# Patient Record
Sex: FEMALE | Race: WHITE | NOT HISPANIC OR LATINO | Employment: OTHER | URBAN - METROPOLITAN AREA
[De-identification: names, ages, dates, MRNs, and addresses within clinical notes are randomized per-mention and may not be internally consistent; named-entity substitution may affect disease eponyms.]

---

## 2017-07-26 ENCOUNTER — CONVERSION ENCOUNTER (OUTPATIENT)
Dept: GENERAL RADIOLOGY | Facility: HOSPITAL | Age: 62
End: 2017-07-26

## 2018-06-11 ENCOUNTER — OFFICE VISIT CONVERTED (OUTPATIENT)
Dept: FAMILY MEDICINE CLINIC | Facility: CLINIC | Age: 63
End: 2018-06-11
Attending: NURSE PRACTITIONER

## 2018-07-30 ENCOUNTER — CONVERSION ENCOUNTER (OUTPATIENT)
Dept: GENERAL RADIOLOGY | Facility: HOSPITAL | Age: 63
End: 2018-07-30

## 2018-09-11 ENCOUNTER — CONVERSION ENCOUNTER (OUTPATIENT)
Dept: FAMILY MEDICINE CLINIC | Facility: CLINIC | Age: 63
End: 2018-09-11

## 2018-09-11 ENCOUNTER — OFFICE VISIT CONVERTED (OUTPATIENT)
Dept: FAMILY MEDICINE CLINIC | Facility: CLINIC | Age: 63
End: 2018-09-11
Attending: NURSE PRACTITIONER

## 2018-12-11 ENCOUNTER — OFFICE VISIT CONVERTED (OUTPATIENT)
Dept: FAMILY MEDICINE CLINIC | Facility: CLINIC | Age: 63
End: 2018-12-11
Attending: NURSE PRACTITIONER

## 2018-12-11 ENCOUNTER — CONVERSION ENCOUNTER (OUTPATIENT)
Dept: FAMILY MEDICINE CLINIC | Facility: CLINIC | Age: 63
End: 2018-12-11

## 2019-03-20 ENCOUNTER — HOSPITAL ENCOUNTER (OUTPATIENT)
Dept: LAB | Facility: HOSPITAL | Age: 64
Discharge: HOME | End: 2019-03-20
Attending: NURSE PRACTITIONER

## 2019-03-20 ENCOUNTER — OFFICE VISIT CONVERTED (OUTPATIENT)
Dept: FAMILY MEDICINE CLINIC | Facility: CLINIC | Age: 64
End: 2019-03-20
Attending: NURSE PRACTITIONER

## 2019-03-20 LAB
25(OH)D3 SERPL-MCNC: 29.1 NG/ML (ref 30–100)
EST. AVERAGE GLUCOSE BLD GHB EST-MCNC: 128 MG/DL
HBA1C MFR BLD: 6.1 % (ref 3.5–5.7)

## 2019-07-10 ENCOUNTER — HOSPITAL ENCOUNTER (OUTPATIENT)
Dept: LAB | Facility: HOSPITAL | Age: 64
Discharge: HOME | End: 2019-07-10
Attending: NURSE PRACTITIONER

## 2019-07-10 LAB
25(OH)D3 SERPL-MCNC: 28.2 NG/ML (ref 30–100)
ALBUMIN SERPL-MCNC: 3.9 G/DL (ref 3.5–5)
ALBUMIN/GLOB SERPL: 1.2 {RATIO} (ref 1.4–2.6)
ALP SERPL-CCNC: 123 U/L (ref 43–160)
ALT SERPL-CCNC: 18 U/L (ref 10–40)
ANION GAP SERPL CALC-SCNC: 19 MMOL/L (ref 8–19)
AST SERPL-CCNC: 14 U/L (ref 15–50)
BASOPHILS # BLD AUTO: 0.03 10*3/UL (ref 0–0.2)
BASOPHILS NFR BLD AUTO: 0.4 % (ref 0–3)
BILIRUB SERPL-MCNC: 0.2 MG/DL (ref 0.2–1.3)
BUN SERPL-MCNC: 12 MG/DL (ref 5–25)
BUN/CREAT SERPL: 20 {RATIO} (ref 6–20)
CALCIUM SERPL-MCNC: 9.1 MG/DL (ref 8.7–10.4)
CHLORIDE SERPL-SCNC: 97 MMOL/L (ref 99–111)
CHOLEST SERPL-MCNC: 165 MG/DL (ref 107–200)
CHOLEST/HDLC SERPL: 4.5 {RATIO} (ref 3–6)
CONV ABS IMM GRAN: 0.06 10*3/UL (ref 0–0.2)
CONV CO2: 26 MMOL/L (ref 22–32)
CONV IMMATURE GRAN: 0.7 % (ref 0–1.8)
CONV TOTAL PROTEIN: 7.1 G/DL (ref 6.3–8.2)
CREAT UR-MCNC: 0.59 MG/DL (ref 0.5–0.9)
DEPRECATED RDW RBC AUTO: 48.3 FL (ref 36.4–46.3)
EOSINOPHIL # BLD AUTO: 0.24 10*3/UL (ref 0–0.7)
EOSINOPHIL # BLD AUTO: 2.8 % (ref 0–7)
ERYTHROCYTE [DISTWIDTH] IN BLOOD BY AUTOMATED COUNT: 16 % (ref 11.7–14.4)
EST. AVERAGE GLUCOSE BLD GHB EST-MCNC: 131 MG/DL
GFR SERPLBLD BASED ON 1.73 SQ M-ARVRAT: >60 ML/MIN/{1.73_M2}
GLOBULIN UR ELPH-MCNC: 3.2 G/DL (ref 2–3.5)
GLUCOSE SERPL-MCNC: 104 MG/DL (ref 65–99)
HBA1C MFR BLD: 11.4 G/DL (ref 12–16)
HBA1C MFR BLD: 6.2 % (ref 3.5–5.7)
HCT VFR BLD AUTO: 38.4 % (ref 37–47)
HDLC SERPL-MCNC: 37 MG/DL (ref 40–60)
LDLC SERPL CALC-MCNC: 94 MG/DL (ref 70–100)
LYMPHOCYTES # BLD AUTO: 1.83 10*3/UL (ref 1–5)
MCH RBC QN AUTO: 24.5 PG (ref 27–31)
MCHC RBC AUTO-ENTMCNC: 29.7 G/DL (ref 33–37)
MCV RBC AUTO: 82.4 FL (ref 81–99)
MONOCYTES # BLD AUTO: 0.4 10*3/UL (ref 0.2–1.2)
MONOCYTES NFR BLD AUTO: 4.7 % (ref 3–10)
NEUTROPHILS # BLD AUTO: 5.99 10*3/UL (ref 2–8)
NEUTROPHILS NFR BLD AUTO: 70 % (ref 30–85)
NRBC CBCN: 0 % (ref 0–0.7)
OSMOLALITY SERPL CALC.SUM OF ELEC: 286 MOSM/KG (ref 273–304)
PLATELET # BLD AUTO: 327 10*3/UL (ref 130–400)
PMV BLD AUTO: 10.7 FL (ref 9.4–12.3)
POTASSIUM SERPL-SCNC: 4.1 MMOL/L (ref 3.5–5.3)
RBC # BLD AUTO: 4.66 10*6/UL (ref 4.2–5.4)
SODIUM SERPL-SCNC: 138 MMOL/L (ref 135–147)
T4 FREE SERPL-MCNC: 1.1 NG/DL (ref 0.9–1.8)
TRIGL SERPL-MCNC: 170 MG/DL (ref 40–150)
TSH SERPL-ACNC: 2.43 M[IU]/L (ref 0.27–4.2)
VARIANT LYMPHS NFR BLD MANUAL: 21.4 % (ref 20–45)
VLDLC SERPL-MCNC: 34 MG/DL (ref 5–37)
WBC # BLD AUTO: 8.55 10*3/UL (ref 4.8–10.8)

## 2019-07-17 ENCOUNTER — OFFICE VISIT CONVERTED (OUTPATIENT)
Dept: FAMILY MEDICINE CLINIC | Facility: CLINIC | Age: 64
End: 2019-07-17
Attending: NURSE PRACTITIONER

## 2019-10-17 ENCOUNTER — OFFICE VISIT CONVERTED (OUTPATIENT)
Dept: FAMILY MEDICINE CLINIC | Facility: CLINIC | Age: 64
End: 2019-10-17
Attending: NURSE PRACTITIONER

## 2019-10-23 ENCOUNTER — HOSPITAL ENCOUNTER (OUTPATIENT)
Dept: LAB | Facility: HOSPITAL | Age: 64
Discharge: HOME | End: 2019-10-23
Attending: NURSE PRACTITIONER

## 2019-10-23 LAB
25(OH)D3 SERPL-MCNC: 23 NG/ML (ref 30–100)
ALBUMIN SERPL-MCNC: 4 G/DL (ref 3.5–5)
ALBUMIN/GLOB SERPL: 1.3 {RATIO} (ref 1.4–2.6)
ALP SERPL-CCNC: 115 U/L (ref 43–160)
ALT SERPL-CCNC: 29 U/L (ref 10–40)
ANION GAP SERPL CALC-SCNC: 20 MMOL/L (ref 8–19)
AST SERPL-CCNC: 21 U/L (ref 15–50)
BASOPHILS # BLD AUTO: 0.04 10*3/UL (ref 0–0.2)
BASOPHILS NFR BLD AUTO: 0.4 % (ref 0–3)
BILIRUB SERPL-MCNC: 0.18 MG/DL (ref 0.2–1.3)
BUN SERPL-MCNC: 11 MG/DL (ref 5–25)
BUN/CREAT SERPL: 22 {RATIO} (ref 6–20)
CALCIUM SERPL-MCNC: 9.4 MG/DL (ref 8.7–10.4)
CHLORIDE SERPL-SCNC: 96 MMOL/L (ref 99–111)
CHOLEST SERPL-MCNC: 160 MG/DL (ref 107–200)
CHOLEST/HDLC SERPL: 4.1 {RATIO} (ref 3–6)
CONV ABS IMM GRAN: 0.04 10*3/UL (ref 0–0.2)
CONV CO2: 26 MMOL/L (ref 22–32)
CONV IMMATURE GRAN: 0.4 % (ref 0–1.8)
CONV TOTAL PROTEIN: 7.1 G/DL (ref 6.3–8.2)
CREAT UR-MCNC: 0.49 MG/DL (ref 0.5–0.9)
DEPRECATED RDW RBC AUTO: 48.9 FL (ref 36.4–46.3)
EOSINOPHIL # BLD AUTO: 0.21 10*3/UL (ref 0–0.7)
EOSINOPHIL # BLD AUTO: 2.2 % (ref 0–7)
ERYTHROCYTE [DISTWIDTH] IN BLOOD BY AUTOMATED COUNT: 16.4 % (ref 11.7–14.4)
EST. AVERAGE GLUCOSE BLD GHB EST-MCNC: 123 MG/DL
GFR SERPLBLD BASED ON 1.73 SQ M-ARVRAT: >60 ML/MIN/{1.73_M2}
GLOBULIN UR ELPH-MCNC: 3.1 G/DL (ref 2–3.5)
GLUCOSE SERPL-MCNC: 111 MG/DL (ref 65–99)
HBA1C MFR BLD: 5.9 % (ref 3.5–5.7)
HCT VFR BLD AUTO: 36.2 % (ref 37–47)
HDLC SERPL-MCNC: 39 MG/DL (ref 40–60)
HGB BLD-MCNC: 11 G/DL (ref 12–16)
LDLC SERPL CALC-MCNC: 72 MG/DL (ref 70–100)
LYMPHOCYTES # BLD AUTO: 1.75 10*3/UL (ref 1–5)
LYMPHOCYTES NFR BLD AUTO: 18.7 % (ref 20–45)
MCH RBC QN AUTO: 24.8 PG (ref 27–31)
MCHC RBC AUTO-ENTMCNC: 30.4 G/DL (ref 33–37)
MCV RBC AUTO: 81.7 FL (ref 81–99)
MONOCYTES # BLD AUTO: 0.47 10*3/UL (ref 0.2–1.2)
MONOCYTES NFR BLD AUTO: 5 % (ref 3–10)
NEUTROPHILS # BLD AUTO: 6.83 10*3/UL (ref 2–8)
NEUTROPHILS NFR BLD AUTO: 73.3 % (ref 30–85)
NRBC CBCN: 0 % (ref 0–0.7)
OSMOLALITY SERPL CALC.SUM OF ELEC: 286 MOSM/KG (ref 273–304)
PLATELET # BLD AUTO: 323 10*3/UL (ref 130–400)
PMV BLD AUTO: 10.3 FL (ref 9.4–12.3)
POTASSIUM SERPL-SCNC: 3.9 MMOL/L (ref 3.5–5.3)
RBC # BLD AUTO: 4.43 10*6/UL (ref 4.2–5.4)
SODIUM SERPL-SCNC: 138 MMOL/L (ref 135–147)
T4 FREE SERPL-MCNC: 1.3 NG/DL (ref 0.9–1.8)
TRIGL SERPL-MCNC: 245 MG/DL (ref 40–150)
TSH SERPL-ACNC: 2.02 M[IU]/L (ref 0.27–4.2)
VLDLC SERPL-MCNC: 49 MG/DL (ref 5–37)
WBC # BLD AUTO: 9.34 10*3/UL (ref 4.8–10.8)

## 2019-11-04 ENCOUNTER — HOSPITAL ENCOUNTER (OUTPATIENT)
Dept: LAB | Facility: HOSPITAL | Age: 64
Discharge: HOME | End: 2019-11-04
Attending: NURSE PRACTITIONER

## 2019-11-04 LAB
BASOPHILS # BLD AUTO: 0.04 10*3/UL (ref 0–0.2)
BASOPHILS NFR BLD AUTO: 0.5 % (ref 0–3)
CONV ABS IMM GRAN: 0.03 10*3/UL (ref 0–0.2)
CONV IMMATURE GRAN: 0.4 % (ref 0–1.8)
DEPRECATED RDW RBC AUTO: 48.6 FL (ref 36.4–46.3)
EOSINOPHIL # BLD AUTO: 0.22 10*3/UL (ref 0–0.7)
EOSINOPHIL # BLD AUTO: 2.8 % (ref 0–7)
ERYTHROCYTE [DISTWIDTH] IN BLOOD BY AUTOMATED COUNT: 16.4 % (ref 11.7–14.4)
HCT VFR BLD AUTO: 35.8 % (ref 37–47)
HGB BLD-MCNC: 11.2 G/DL (ref 12–16)
IRON SATN MFR SERPL: 13 % (ref 20–55)
IRON SERPL-MCNC: 52 UG/DL (ref 60–170)
LYMPHOCYTES # BLD AUTO: 1.56 10*3/UL (ref 1–5)
LYMPHOCYTES NFR BLD AUTO: 20 % (ref 20–45)
MCH RBC QN AUTO: 25.3 PG (ref 27–31)
MCHC RBC AUTO-ENTMCNC: 31.3 G/DL (ref 33–37)
MCV RBC AUTO: 80.8 FL (ref 81–99)
MONOCYTES # BLD AUTO: 0.34 10*3/UL (ref 0.2–1.2)
MONOCYTES NFR BLD AUTO: 4.4 % (ref 3–10)
NEUTROPHILS # BLD AUTO: 5.62 10*3/UL (ref 2–8)
NEUTROPHILS NFR BLD AUTO: 71.9 % (ref 30–85)
NRBC CBCN: 0 % (ref 0–0.7)
PLATELET # BLD AUTO: 318 10*3/UL (ref 130–400)
PMV BLD AUTO: 10.4 FL (ref 9.4–12.3)
RBC # BLD AUTO: 4.43 10*6/UL (ref 4.2–5.4)
TIBC SERPL-MCNC: 406 UG/DL (ref 245–450)
TRANSFERRIN SERPL-MCNC: 284 MG/DL (ref 250–380)
WBC # BLD AUTO: 7.81 10*3/UL (ref 4.8–10.8)

## 2020-01-14 ENCOUNTER — HOSPITAL ENCOUNTER (OUTPATIENT)
Dept: GENERAL RADIOLOGY | Facility: HOSPITAL | Age: 65
Discharge: HOME | End: 2020-01-14
Attending: NURSE PRACTITIONER

## 2020-01-21 ENCOUNTER — OFFICE VISIT CONVERTED (OUTPATIENT)
Dept: FAMILY MEDICINE CLINIC | Facility: CLINIC | Age: 65
End: 2020-01-21
Attending: NURSE PRACTITIONER

## 2020-01-29 ENCOUNTER — HOSPITAL ENCOUNTER (OUTPATIENT)
Dept: LAB | Facility: HOSPITAL | Age: 65
Discharge: HOME | End: 2020-01-29
Attending: NURSE PRACTITIONER

## 2020-01-29 LAB
ALBUMIN SERPL-MCNC: 4 G/DL (ref 3.5–5)
ALBUMIN/GLOB SERPL: 1.3 {RATIO} (ref 1.4–2.6)
ALP SERPL-CCNC: 112 U/L (ref 43–160)
ALT SERPL-CCNC: 25 U/L (ref 10–40)
ANION GAP SERPL CALC-SCNC: 20 MMOL/L (ref 8–19)
AST SERPL-CCNC: 19 U/L (ref 15–50)
BASOPHILS # BLD AUTO: 0.04 10*3/UL (ref 0–0.2)
BASOPHILS NFR BLD AUTO: 0.5 % (ref 0–3)
BILIRUB SERPL-MCNC: 0.33 MG/DL (ref 0.2–1.3)
BUN SERPL-MCNC: 12 MG/DL (ref 5–25)
BUN/CREAT SERPL: 26 {RATIO} (ref 6–20)
CALCIUM SERPL-MCNC: 9.4 MG/DL (ref 8.7–10.4)
CHLORIDE SERPL-SCNC: 98 MMOL/L (ref 99–111)
CHOLEST SERPL-MCNC: 172 MG/DL (ref 107–200)
CHOLEST/HDLC SERPL: 5.1 {RATIO} (ref 3–6)
CONV ABS IMM GRAN: 0.04 10*3/UL (ref 0–0.2)
CONV CO2: 25 MMOL/L (ref 22–32)
CONV CREATININE URINE, RANDOM: 174 MG/DL (ref 10–300)
CONV IMMATURE GRAN: 0.5 % (ref 0–1.8)
CONV MICROALBUM.,U,RANDOM: <12 MG/L (ref 0–20)
CONV TOTAL PROTEIN: 7.1 G/DL (ref 6.3–8.2)
CREAT UR-MCNC: 0.47 MG/DL (ref 0.5–0.9)
DEPRECATED RDW RBC AUTO: 51.2 FL (ref 36.4–46.3)
EOSINOPHIL # BLD AUTO: 0.23 10*3/UL (ref 0–0.7)
EOSINOPHIL # BLD AUTO: 2.8 % (ref 0–7)
ERYTHROCYTE [DISTWIDTH] IN BLOOD BY AUTOMATED COUNT: 16.7 % (ref 11.7–14.4)
EST. AVERAGE GLUCOSE BLD GHB EST-MCNC: 137 MG/DL
GFR SERPLBLD BASED ON 1.73 SQ M-ARVRAT: >60 ML/MIN/{1.73_M2}
GLOBULIN UR ELPH-MCNC: 3.1 G/DL (ref 2–3.5)
GLUCOSE SERPL-MCNC: 112 MG/DL (ref 65–99)
HBA1C MFR BLD: 6.4 % (ref 3.5–5.7)
HCT VFR BLD AUTO: 37.8 % (ref 37–47)
HDLC SERPL-MCNC: 34 MG/DL (ref 40–60)
HGB BLD-MCNC: 11.6 G/DL (ref 12–16)
LDLC SERPL CALC-MCNC: 97 MG/DL (ref 70–100)
LYMPHOCYTES # BLD AUTO: 1.71 10*3/UL (ref 1–5)
LYMPHOCYTES NFR BLD AUTO: 20.5 % (ref 20–45)
MCH RBC QN AUTO: 25.8 PG (ref 27–31)
MCHC RBC AUTO-ENTMCNC: 30.7 G/DL (ref 33–37)
MCV RBC AUTO: 84.2 FL (ref 81–99)
MICROALBUMIN/CREAT UR: 6.9 MG/G{CRE} (ref 0–35)
MONOCYTES # BLD AUTO: 0.41 10*3/UL (ref 0.2–1.2)
MONOCYTES NFR BLD AUTO: 4.9 % (ref 3–10)
NEUTROPHILS # BLD AUTO: 5.93 10*3/UL (ref 2–8)
NEUTROPHILS NFR BLD AUTO: 70.8 % (ref 30–85)
NRBC CBCN: 0 % (ref 0–0.7)
OSMOLALITY SERPL CALC.SUM OF ELEC: 289 MOSM/KG (ref 273–304)
PLATELET # BLD AUTO: 313 10*3/UL (ref 130–400)
PMV BLD AUTO: 10.7 FL (ref 9.4–12.3)
POTASSIUM SERPL-SCNC: 3.8 MMOL/L (ref 3.5–5.3)
RBC # BLD AUTO: 4.49 10*6/UL (ref 4.2–5.4)
SODIUM SERPL-SCNC: 139 MMOL/L (ref 135–147)
T4 FREE SERPL-MCNC: 1.1 NG/DL (ref 0.9–1.8)
TRIGL SERPL-MCNC: 205 MG/DL (ref 40–150)
TSH SERPL-ACNC: 1.96 M[IU]/L (ref 0.27–4.2)
VLDLC SERPL-MCNC: 41 MG/DL (ref 5–37)
WBC # BLD AUTO: 8.36 10*3/UL (ref 4.8–10.8)

## 2020-02-20 ENCOUNTER — CONVERSION ENCOUNTER (OUTPATIENT)
Dept: FAMILY MEDICINE CLINIC | Facility: CLINIC | Age: 65
End: 2020-02-20

## 2020-02-20 ENCOUNTER — OFFICE VISIT CONVERTED (OUTPATIENT)
Dept: FAMILY MEDICINE CLINIC | Facility: CLINIC | Age: 65
End: 2020-02-20
Attending: NURSE PRACTITIONER

## 2020-05-15 ENCOUNTER — HOSPITAL ENCOUNTER (OUTPATIENT)
Dept: LAB | Facility: HOSPITAL | Age: 65
Discharge: HOME | End: 2020-05-15
Attending: NURSE PRACTITIONER

## 2020-05-15 LAB
EST. AVERAGE GLUCOSE BLD GHB EST-MCNC: 131 MG/DL
HBA1C MFR BLD: 6.2 % (ref 3.5–5.7)

## 2020-05-19 ENCOUNTER — HOSPITAL ENCOUNTER (OUTPATIENT)
Dept: LAB | Facility: HOSPITAL | Age: 65
Discharge: HOME | End: 2020-05-19
Attending: NURSE PRACTITIONER

## 2020-05-19 LAB
ALBUMIN SERPL-MCNC: 4.2 G/DL (ref 3.5–5)
ALBUMIN/GLOB SERPL: 1.3 {RATIO} (ref 1.4–2.6)
ALP SERPL-CCNC: 111 U/L (ref 43–160)
ALT SERPL-CCNC: 19 U/L (ref 10–40)
ANION GAP SERPL CALC-SCNC: 20 MMOL/L (ref 8–19)
AST SERPL-CCNC: 18 U/L (ref 15–50)
BASOPHILS # BLD AUTO: 0.05 10*3/UL (ref 0–0.2)
BASOPHILS NFR BLD AUTO: 0.6 % (ref 0–3)
BILIRUB SERPL-MCNC: 0.25 MG/DL (ref 0.2–1.3)
BUN SERPL-MCNC: 12 MG/DL (ref 5–25)
BUN/CREAT SERPL: 19 {RATIO} (ref 6–20)
CALCIUM SERPL-MCNC: 9.4 MG/DL (ref 8.7–10.4)
CHLORIDE SERPL-SCNC: 99 MMOL/L (ref 99–111)
CHOLEST SERPL-MCNC: 176 MG/DL (ref 107–200)
CHOLEST/HDLC SERPL: 4.8 {RATIO} (ref 3–6)
CONV ABS IMM GRAN: 0.03 10*3/UL (ref 0–0.2)
CONV CO2: 23 MMOL/L (ref 22–32)
CONV IMMATURE GRAN: 0.4 % (ref 0–1.8)
CONV TOTAL PROTEIN: 7.4 G/DL (ref 6.3–8.2)
CREAT UR-MCNC: 0.63 MG/DL (ref 0.5–0.9)
DEPRECATED RDW RBC AUTO: 46.7 FL (ref 36.4–46.3)
EOSINOPHIL # BLD AUTO: 0.26 10*3/UL (ref 0–0.7)
EOSINOPHIL # BLD AUTO: 3.1 % (ref 0–7)
ERYTHROCYTE [DISTWIDTH] IN BLOOD BY AUTOMATED COUNT: 15.4 % (ref 11.7–14.4)
EST. AVERAGE GLUCOSE BLD GHB EST-MCNC: 140 MG/DL
GFR SERPLBLD BASED ON 1.73 SQ M-ARVRAT: >60 ML/MIN/{1.73_M2}
GLOBULIN UR ELPH-MCNC: 3.2 G/DL (ref 2–3.5)
GLUCOSE SERPL-MCNC: 118 MG/DL (ref 65–99)
HBA1C MFR BLD: 6.5 % (ref 3.5–5.7)
HCT VFR BLD AUTO: 42.1 % (ref 37–47)
HDLC SERPL-MCNC: 37 MG/DL (ref 40–60)
HGB BLD-MCNC: 12.9 G/DL (ref 12–16)
LDLC SERPL CALC-MCNC: 102 MG/DL (ref 70–100)
LYMPHOCYTES # BLD AUTO: 1.58 10*3/UL (ref 1–5)
LYMPHOCYTES NFR BLD AUTO: 18.8 % (ref 20–45)
MCH RBC QN AUTO: 25.9 PG (ref 27–31)
MCHC RBC AUTO-ENTMCNC: 30.6 G/DL (ref 33–37)
MCV RBC AUTO: 84.4 FL (ref 81–99)
MONOCYTES # BLD AUTO: 0.41 10*3/UL (ref 0.2–1.2)
MONOCYTES NFR BLD AUTO: 4.9 % (ref 3–10)
NEUTROPHILS # BLD AUTO: 6.06 10*3/UL (ref 2–8)
NEUTROPHILS NFR BLD AUTO: 72.2 % (ref 30–85)
NRBC CBCN: 0 % (ref 0–0.7)
OSMOLALITY SERPL CALC.SUM OF ELEC: 287 MOSM/KG (ref 273–304)
PLATELET # BLD AUTO: 319 10*3/UL (ref 130–400)
PMV BLD AUTO: 10.8 FL (ref 9.4–12.3)
POTASSIUM SERPL-SCNC: 4 MMOL/L (ref 3.5–5.3)
RBC # BLD AUTO: 4.99 10*6/UL (ref 4.2–5.4)
SODIUM SERPL-SCNC: 138 MMOL/L (ref 135–147)
T4 FREE SERPL-MCNC: 1 NG/DL (ref 0.9–1.8)
TRIGL SERPL-MCNC: 186 MG/DL (ref 40–150)
TSH SERPL-ACNC: 1.53 M[IU]/L (ref 0.27–4.2)
VLDLC SERPL-MCNC: 37 MG/DL (ref 5–37)
WBC # BLD AUTO: 8.39 10*3/UL (ref 4.8–10.8)

## 2020-05-20 ENCOUNTER — OFFICE VISIT CONVERTED (OUTPATIENT)
Dept: FAMILY MEDICINE CLINIC | Facility: CLINIC | Age: 65
End: 2020-05-20
Attending: NURSE PRACTITIONER

## 2020-05-20 ENCOUNTER — CONVERSION ENCOUNTER (OUTPATIENT)
Dept: FAMILY MEDICINE CLINIC | Facility: CLINIC | Age: 65
End: 2020-05-20

## 2020-08-20 ENCOUNTER — HOSPITAL ENCOUNTER (OUTPATIENT)
Dept: LAB | Facility: HOSPITAL | Age: 65
Discharge: HOME | End: 2020-08-20
Attending: NURSE PRACTITIONER

## 2020-08-20 LAB
BASOPHILS # BLD AUTO: 0.03 10*3/UL (ref 0–0.2)
BASOPHILS NFR BLD AUTO: 0.5 % (ref 0–3)
CONV ABS IMM GRAN: 0.01 10*3/UL (ref 0–0.2)
CONV IMMATURE GRAN: 0.2 % (ref 0–1.8)
DEPRECATED RDW RBC AUTO: 49.3 FL (ref 36.4–46.3)
EOSINOPHIL # BLD AUTO: 0.18 10*3/UL (ref 0–0.7)
EOSINOPHIL # BLD AUTO: 2.8 % (ref 0–7)
ERYTHROCYTE [DISTWIDTH] IN BLOOD BY AUTOMATED COUNT: 16.1 % (ref 11.7–14.4)
EST. AVERAGE GLUCOSE BLD GHB EST-MCNC: 126 MG/DL
HBA1C MFR BLD: 6 % (ref 3.5–5.7)
HCT VFR BLD AUTO: 43.1 % (ref 37–47)
HGB BLD-MCNC: 13.2 G/DL (ref 12–16)
LYMPHOCYTES # BLD AUTO: 1.66 10*3/UL (ref 1–5)
LYMPHOCYTES NFR BLD AUTO: 25.5 % (ref 20–45)
MCH RBC QN AUTO: 25.7 PG (ref 27–31)
MCHC RBC AUTO-ENTMCNC: 30.6 G/DL (ref 33–37)
MCV RBC AUTO: 84 FL (ref 81–99)
MONOCYTES # BLD AUTO: 0.54 10*3/UL (ref 0.2–1.2)
MONOCYTES NFR BLD AUTO: 8.3 % (ref 3–10)
NEUTROPHILS # BLD AUTO: 4.08 10*3/UL (ref 2–8)
NEUTROPHILS NFR BLD AUTO: 62.7 % (ref 30–85)
NRBC CBCN: 0 % (ref 0–0.7)
PLATELET # BLD AUTO: 291 10*3/UL (ref 130–400)
PMV BLD AUTO: 10.6 FL (ref 9.4–12.3)
RBC # BLD AUTO: 5.13 10*6/UL (ref 4.2–5.4)
WBC # BLD AUTO: 6.5 10*3/UL (ref 4.8–10.8)

## 2020-08-21 LAB
25(OH)D3 SERPL-MCNC: 30.1 NG/ML (ref 30–100)
ALBUMIN SERPL-MCNC: 4.2 G/DL (ref 3.5–5)
ALBUMIN/GLOB SERPL: 1.4 {RATIO} (ref 1.4–2.6)
ALP SERPL-CCNC: 104 U/L (ref 43–160)
ALT SERPL-CCNC: 25 U/L (ref 10–40)
ANION GAP SERPL CALC-SCNC: 19 MMOL/L (ref 8–19)
AST SERPL-CCNC: 24 U/L (ref 15–50)
BILIRUB SERPL-MCNC: 0.25 MG/DL (ref 0.2–1.3)
BUN SERPL-MCNC: 11 MG/DL (ref 5–25)
BUN/CREAT SERPL: 16 {RATIO} (ref 6–20)
CALCIUM SERPL-MCNC: 9.9 MG/DL (ref 8.7–10.4)
CHLORIDE SERPL-SCNC: 98 MMOL/L (ref 99–111)
CONV CO2: 25 MMOL/L (ref 22–32)
CONV TOTAL PROTEIN: 7.1 G/DL (ref 6.3–8.2)
CREAT UR-MCNC: 0.7 MG/DL (ref 0.5–0.9)
GFR SERPLBLD BASED ON 1.73 SQ M-ARVRAT: >60 ML/MIN/{1.73_M2}
GLOBULIN UR ELPH-MCNC: 2.9 G/DL (ref 2–3.5)
GLUCOSE SERPL-MCNC: 113 MG/DL (ref 65–99)
IRON SATN MFR SERPL: 11 % (ref 20–55)
IRON SERPL-MCNC: 43 UG/DL (ref 60–170)
OSMOLALITY SERPL CALC.SUM OF ELEC: 286 MOSM/KG (ref 273–304)
POTASSIUM SERPL-SCNC: 3.8 MMOL/L (ref 3.5–5.3)
SODIUM SERPL-SCNC: 138 MMOL/L (ref 135–147)
T4 FREE SERPL-MCNC: 1 NG/DL (ref 0.9–1.8)
TIBC SERPL-MCNC: 375 UG/DL (ref 245–450)
TRANSFERRIN SERPL-MCNC: 262 MG/DL (ref 250–380)
TSH SERPL-ACNC: 1.81 M[IU]/L (ref 0.27–4.2)

## 2020-08-25 ENCOUNTER — OFFICE VISIT CONVERTED (OUTPATIENT)
Dept: FAMILY MEDICINE CLINIC | Facility: CLINIC | Age: 65
End: 2020-08-25
Attending: NURSE PRACTITIONER

## 2021-02-25 ENCOUNTER — OFFICE VISIT CONVERTED (OUTPATIENT)
Dept: FAMILY MEDICINE CLINIC | Facility: CLINIC | Age: 66
End: 2021-02-25
Attending: NURSE PRACTITIONER

## 2021-02-25 ENCOUNTER — CONVERSION ENCOUNTER (OUTPATIENT)
Dept: FAMILY MEDICINE CLINIC | Facility: CLINIC | Age: 66
End: 2021-02-25

## 2021-03-23 ENCOUNTER — OFFICE VISIT CONVERTED (OUTPATIENT)
Dept: FAMILY MEDICINE CLINIC | Facility: CLINIC | Age: 66
End: 2021-03-23
Attending: NURSE PRACTITIONER

## 2021-04-13 ENCOUNTER — CONVERSION ENCOUNTER (OUTPATIENT)
Dept: FAMILY MEDICINE CLINIC | Facility: CLINIC | Age: 66
End: 2021-04-13

## 2021-04-13 ENCOUNTER — OFFICE VISIT CONVERTED (OUTPATIENT)
Dept: FAMILY MEDICINE CLINIC | Facility: CLINIC | Age: 66
End: 2021-04-13
Attending: NURSE PRACTITIONER

## 2021-04-13 ENCOUNTER — HOSPITAL ENCOUNTER (OUTPATIENT)
Dept: FAMILY MEDICINE CLINIC | Facility: CLINIC | Age: 66
Discharge: HOME | End: 2021-04-13
Attending: NURSE PRACTITIONER

## 2021-04-13 LAB
BILIRUB UR QL STRIP: NEGATIVE
COLOR UR: YELLOW
CONV CLARITY OF URINE: NORMAL
CONV PROTEIN IN URINE BY AUTOMATED TEST STRIP: NEGATIVE
CONV UROBILINOGEN IN URINE BY AUTOMATED TEST STRIP: NORMAL
GLUCOSE UR QL: NORMAL
HGB UR QL STRIP: NEGATIVE
KETONES UR QL STRIP: NEGATIVE
LEUKOCYTE ESTERASE UR QL STRIP: NORMAL
NITRITE UR QL STRIP: NEGATIVE
PH UR STRIP.AUTO: 7 [PH]
SP GR UR: 1.01

## 2021-04-15 LAB
BACTERIA SPEC AEROBE CULT: NORMAL
BACTERIA UR CULT: NORMAL

## 2021-04-21 ENCOUNTER — CONVERSION ENCOUNTER (OUTPATIENT)
Dept: FAMILY MEDICINE CLINIC | Facility: CLINIC | Age: 66
End: 2021-04-21

## 2021-04-21 ENCOUNTER — OFFICE VISIT CONVERTED (OUTPATIENT)
Dept: FAMILY MEDICINE CLINIC | Facility: CLINIC | Age: 66
End: 2021-04-21
Attending: NURSE PRACTITIONER

## 2021-04-21 ENCOUNTER — HOSPITAL ENCOUNTER (OUTPATIENT)
Dept: FAMILY MEDICINE CLINIC | Facility: CLINIC | Age: 66
Discharge: HOME | End: 2021-04-21
Attending: NURSE PRACTITIONER

## 2021-04-24 LAB
AMPICILLIN SUSC ISLT: <=2
AMPICILLIN SUSC ISLT: <=2
AMPICILLIN+SULBAC SUSC ISLT: <=2
AMPICILLIN+SULBAC SUSC ISLT: <=2
BACTERIA SPEC AEROBE CULT: ABNORMAL
CEFAZOLIN SUSC ISLT: <=4
CEFAZOLIN SUSC ISLT: <=4
CEFEPIME SUSC ISLT: <=0.12
CEFEPIME SUSC ISLT: <=0.12
CEFTAZIDIME SUSC ISLT: <=1
CEFTAZIDIME SUSC ISLT: <=1
CEFTRIAXONE SUSC ISLT: <=0.25
CEFTRIAXONE SUSC ISLT: <=0.25
CIPROFLOXACIN SUSC ISLT: <=0.25
CIPROFLOXACIN SUSC ISLT: <=0.25
ERTAPENEM SUSC ISLT: <=0.12
ERTAPENEM SUSC ISLT: <=0.12
GENTAMICIN SUSC ISLT: <=1
GENTAMICIN SUSC ISLT: <=1
LEVOFLOXACIN SUSC ISLT: <=0.12
LEVOFLOXACIN SUSC ISLT: <=0.12
PIP+TAZO SUSC ISLT: <=4
PIP+TAZO SUSC ISLT: <=4
TMP SMX SUSC ISLT: <=20
TMP SMX SUSC ISLT: <=20
TOBRAMYCIN SUSC ISLT: <=1
TOBRAMYCIN SUSC ISLT: <=1

## 2021-05-07 ENCOUNTER — HOSPITAL ENCOUNTER (OUTPATIENT)
Dept: LAB | Facility: HOSPITAL | Age: 66
Discharge: HOME | End: 2021-05-07
Attending: NURSE PRACTITIONER

## 2021-05-07 LAB
25(OH)D3 SERPL-MCNC: 29 NG/ML (ref 30–100)
ALBUMIN SERPL-MCNC: 4.2 G/DL (ref 3.5–5)
ALBUMIN/GLOB SERPL: 1.3 {RATIO} (ref 1.4–2.6)
ALP SERPL-CCNC: 110 U/L (ref 43–160)
ALT SERPL-CCNC: 29 U/L (ref 10–40)
ANION GAP SERPL CALC-SCNC: 17 MMOL/L (ref 8–19)
AST SERPL-CCNC: 22 U/L (ref 15–50)
BASOPHILS # BLD AUTO: 0.04 10*3/UL (ref 0–0.2)
BASOPHILS NFR BLD AUTO: 0.5 % (ref 0–3)
BILIRUB SERPL-MCNC: 0.19 MG/DL (ref 0.2–1.3)
BUN SERPL-MCNC: 11 MG/DL (ref 5–25)
BUN/CREAT SERPL: 17 {RATIO} (ref 6–20)
CALCIUM SERPL-MCNC: 9.2 MG/DL (ref 8.7–10.4)
CHLORIDE SERPL-SCNC: 98 MMOL/L (ref 99–111)
CHOLEST SERPL-MCNC: 182 MG/DL (ref 107–200)
CHOLEST/HDLC SERPL: 4.3 {RATIO} (ref 3–6)
CONV ABS IMM GRAN: 0.03 10*3/UL (ref 0–0.2)
CONV CO2: 27 MMOL/L (ref 22–32)
CONV IMMATURE GRAN: 0.4 % (ref 0–1.8)
CONV TOTAL PROTEIN: 7.4 G/DL (ref 6.3–8.2)
CREAT UR-MCNC: 0.64 MG/DL (ref 0.5–0.9)
DEPRECATED RDW RBC AUTO: 48.8 FL (ref 36.4–46.3)
EOSINOPHIL # BLD AUTO: 0.31 10*3/UL (ref 0–0.7)
EOSINOPHIL # BLD AUTO: 4.1 % (ref 0–7)
ERYTHROCYTE [DISTWIDTH] IN BLOOD BY AUTOMATED COUNT: 15.5 % (ref 11.7–14.4)
EST. AVERAGE GLUCOSE BLD GHB EST-MCNC: 123 MG/DL
GFR SERPLBLD BASED ON 1.73 SQ M-ARVRAT: >60 ML/MIN/{1.73_M2}
GLOBULIN UR ELPH-MCNC: 3.2 G/DL (ref 2–3.5)
GLUCOSE SERPL-MCNC: 115 MG/DL (ref 65–99)
HBA1C MFR BLD: 5.9 % (ref 3.5–5.7)
HCT VFR BLD AUTO: 41.9 % (ref 37–47)
HDLC SERPL-MCNC: 42 MG/DL (ref 40–60)
HGB BLD-MCNC: 13.1 G/DL (ref 12–16)
LDLC SERPL CALC-MCNC: 98 MG/DL (ref 70–100)
LYMPHOCYTES # BLD AUTO: 1.56 10*3/UL (ref 1–5)
LYMPHOCYTES NFR BLD AUTO: 20.4 % (ref 20–45)
MCH RBC QN AUTO: 27.1 PG (ref 27–31)
MCHC RBC AUTO-ENTMCNC: 31.3 G/DL (ref 33–37)
MCV RBC AUTO: 86.6 FL (ref 81–99)
MONOCYTES # BLD AUTO: 0.42 10*3/UL (ref 0.2–1.2)
MONOCYTES NFR BLD AUTO: 5.5 % (ref 3–10)
NEUTROPHILS # BLD AUTO: 5.29 10*3/UL (ref 2–8)
NEUTROPHILS NFR BLD AUTO: 69.1 % (ref 30–85)
NRBC CBCN: 0 % (ref 0–0.7)
OSMOLALITY SERPL CALC.SUM OF ELEC: 286 MOSM/KG (ref 273–304)
PLATELET # BLD AUTO: 313 10*3/UL (ref 130–400)
PMV BLD AUTO: 10.5 FL (ref 9.4–12.3)
POTASSIUM SERPL-SCNC: 4.4 MMOL/L (ref 3.5–5.3)
RBC # BLD AUTO: 4.84 10*6/UL (ref 4.2–5.4)
SODIUM SERPL-SCNC: 138 MMOL/L (ref 135–147)
T4 FREE SERPL-MCNC: 0.9 NG/DL (ref 0.9–1.8)
TRIGL SERPL-MCNC: 211 MG/DL (ref 40–150)
TSH SERPL-ACNC: 2.17 M[IU]/L (ref 0.27–4.2)
VLDLC SERPL-MCNC: 42 MG/DL (ref 5–37)
WBC # BLD AUTO: 7.65 10*3/UL (ref 4.8–10.8)

## 2021-05-10 ENCOUNTER — CONVERSION ENCOUNTER (OUTPATIENT)
Dept: FAMILY MEDICINE CLINIC | Facility: CLINIC | Age: 66
End: 2021-05-10

## 2021-05-10 ENCOUNTER — OFFICE VISIT CONVERTED (OUTPATIENT)
Dept: FAMILY MEDICINE CLINIC | Facility: CLINIC | Age: 66
End: 2021-05-10
Attending: NURSE PRACTITIONER

## 2021-05-13 NOTE — PROGRESS NOTES
Progress Note      Patient Name: Adelita Garcia   Patient ID: [id number]   Sex: Female   YOB: 1955    Primary Care Provider: Nesha MENDEZ   Referring Provider: Nesha MENDEZ    Visit Date: August 25, 2020    Provider: VANESSA Laboy   Location: [facility]   Location Address: [address] [place] [address], [address]  [place], [place]  [id number]   Location Phone: [phone]          Chief Complaint  · 6 mo f/u      History Of Present Illness  Adelita Garcia is a 64 year old /White female who presents for evaluation and treatment of:      Pt is a 6 mo f/u. No issues to report at this time.     Pt states she needs refills on all medications sent to [facility] pharmacy.       Past Medical History  Disease Name Date Onset Notes   Anxiety --  --    Arthritis 11/19/2015 --    Bone Density Screening 10/17/2019 normal   Diabetic Eye Exam Last Completed 7/22/2019 scanned in chart   Eye exam, routine 1/2019 Yrly Eye Exam   Hyperlipidemia --  --    Hypertension --  --    Hypertriglyceridemia 09/15/2017 --    Joint pain --  --    Major depressive disorder 09/11/2018 --    Pap smear for cervical cancer screening 2008 No Longer   Primary insomnia 11/19/2015 --    Screening Mammogram 1/15/2020 normal   Vitamin D deficiency 06/11/2018 --          Past Surgical History  Procedure Name Date Notes   Cesarian Section 1984 and 1989 --    Colonoscopy 07/09/2012 Dr. Harris- normal repeat in 10 years   Dilation and curretage 2000 and 2007 --    Hysterectomy 2007 --          Medication List  Name Date Started Instructions   Aspir-81 81 mg oral tablet,delayed release (DR/EC)  take 1 tablet (81 mg) by oral route once daily   baclofen 10 mg oral tablet 08/25/2020 TAKE 1 TABLET BY MOUTH ONCE DAILY AT BEDTIME AS NEEDED   bupropion HCl 150 mg oral tablet extended release 24 hr 08/25/2020 TAKE 1 TABLET BY MOUTH ONCE DAILY   diclofenac sodium 75 mg oral tablet,delayed release (DR/EC) 08/25/2020 TAKE 1 TABLET BY  MOUTH TWICE DAILY   ferrous sulfate 325 mg (65 mg iron) oral tablet 2020 take 1 tablet (325 mg) by oral route 2 times per day for 90 days   fluoxetine 40 mg oral capsule 2020 TAKE 1 CAPSULE BY MOUTH ONCE DAILY IN THE MORNING   hydrochlorothiazide 25 mg oral tablet 2020 Take 1 tablet by mouth once daily   Jardiance 10 mg oral tablet 2020 TAKE 1 TABLET BY MOUTH ONCE DAILY IN THE MORNING FOR 90 DAYS   losartan 100 mg oral tablet 2020 TAKE 1 TABLET BY MOUTH ONCE DAILY   metformin 1,000 mg oral tablet 2020 TAKE 1 TABLET BY MOUTH TWICE DAILY WITH MORNING AND EVENING MEALS   multivitamin oral  --    pravastatin 20 mg oral tablet 2020 TAKE 1 TABLET BY MOUTH ONCE DAILY   Vitamin D3 2,000 unit oral tablet  take 1 tablet by oral route daily         Allergy List  Allergen Name Date Reaction Notes   NO KNOWN DRUG ALLERGIES --  --  --          Family Medical History  Disease Name Relative/Age Notes   Stroke Mother/   Mother   Heart Disease Brother/  Father/  Mother/   Father; Mother; Brother   Hypertension Father/  Mother/   --    - No Family History of Colorectal Cancer  --    Family history of certain chronic disabling diseases; arthritis Brother/   Brother         Reproductive History  Menstrual   Age Menarche: 14   Pregnancy Summary   Total Pregnancies: 2 Full Term: 2 Premature: 0   Ab Induced: 0 Ab Spontaneous: 0 Ectopics: 0   Multiples: 0 Livin         Social History  Finding Status Start/Stop Quantity Notes   Active but no formal exercise --  --/-- --  --    Alcohol Never --/-- --  --     --  --/-- --  --    No known infection risk --  --/-- --  --    Recreational Drug Use Never --/-- --  no   Tobacco Never --/-- --  never smoker   Working --  --/-- --  --          Immunizations  NameDate Admin Mfg Trade Name Lot Number Route Inj VIS Given VIS Publication   Vexodcbme51/ [facility] Fluzone Quadrivalent [id number] IM LD 10/17/2019    Comments: Pt tolerated injection well  "  Oeibnuxvi54/17/2019 [facility] Fluzone Quadrivalent [id number] IM LD 10/17/2019    Comments: Pt tolerated injection well   Nfpmvphfd46/11/2018 [facility] Fluzone Quadrivalent [id number] IM RD 12/11/2018 08/07/2015   Comments: Pt tolerated well   Wufdwruzy40/31/2017 [facility] Fluzone Quadrivalent [id number] IM RD 10/31/2017 08/07/2015   Comments: Injection given. Pt tolerated well.   Tdap05/09/2017 [facility] BOOSTRIX [id number] IM RD 05/09/2017 01/24/2012   Comments: Injection given. Pt tolerated well.         Review of Systems  · Constitutional  o Denies  o : fever, fatigue, weight loss, weight gain  · Cardiovascular  o Denies  o : lower extremity edema, claudication, chest pressure, palpitations  · Respiratory  o Denies  o : shortness of breath, wheezing, cough, hemoptysis, dyspnea on exertion  · Gastrointestinal  o Denies  o : nausea, vomiting, diarrhea, constipation, abdominal pain  · Psychiatric  o Admits  o : anxiety      Vitals  Date Time BP Position Site L\R Cuff Size HR RR TEMP (F) WT  HT  BMI kg/m2 BSA m2 O2 Sat        02/20/2020 11:04 /76 Sitting    84 - R   224lbs 8oz 5'  3\" 39.77 2.13 92 %    05/20/2020 11:05 /84 Sitting    86 - R   221lbs 8oz 5'  3\" 39.24 2.11 95 %    08/25/2020 11:25 /60 Sitting    87 - R   220lbs 4oz 5'  3\" 39.02 2.11 96 %          Physical Examination  · Constitutional  o Appearance  o : well-nourished, well developed, alert, in no acute distress  · Ears, Nose, Mouth and Throat  o Ears  o :   § External Ears  § : appearance within normal limits, no lesions present  § Otoscopic Examination  § : tympanic membrane appearance within normal limits bilaterally without perforations, mobility normal  o Nose  o :   § External Nose  § : normal stucture noted.  § Intranasal Exam  § : no swelling, reddness, turbs normal salome.  o Oral Cavity  o :   § Oral Mucosa  § : oral mucosa normal without pallor or cyanosis  § Lips  § : lip appearance normal  § Teeth  § : normal dentition for age  § Gums  § : gums pink, " non-swollen, no bleeding present  § Tongue  § : tongue appearance normal  § Palate  § : hard palate normal, soft palate appearance normal  o Throat  o :   § Oropharynx  § : no inflammation or lesions present, tonsils within normal limits  · Respiratory  o Respiratory Effort  o : breathing unlabored  o Auscultation of Lungs  o : normal breath sounds throughout  · Cardiovascular  o Heart  o :   § Auscultation of Heart  § : regular rate and rhythm, no murmurs, gallops or rubs  § Palpation of Heart  § : normal apical impulse, no cardiac thrill present  o Peripheral Vascular System  o :   § Extremities  § : no cyanosis, clubbing or edema; less than 2 second refill noted  · Gastrointestinal  o Abdominal Examination  o : abdomen nontender to palpation, tone normal without rigidity or guarding, no masses present, abdominal contour scaphoid  o Liver and spleen  o : no hepatomegaly present, liver nontender to palpation, spleen not palpable  · Skin and Subcutaneous Tissue  o General Inspection  o : no rashes or lesions present, no areas of discoloration  · Neurologic  o Mental Status Examination  o :   § Orientation  § : grossly oriented to person, place and time  o Cranial Nerves  o : cranial nerves intact and symmetric throughout  · Psychiatric  o Mood and Affect  o : mood normal, affect appropriate, denies any SI/HI              Assessment  · Anemia     285.9/D64.9  · Anxiety disorder     300.00/F41.9  · Diabetes mellitus, type 2     250.00/E11.9  · Hyperlipidemia     272.4/E78.5  · Insomnia, unspecified     780.52/G47.00  · Major depressive disorder     296.20/F32.2  · Vitamin D deficiency     268.9/E55.9  · Screening for depression     V79.0/Z13.89  · Arthritis     716.90/M19.90      Plan  · Orders  o ACO-39: Current medications updated and reviewed () - - 08/25/2020  · Medications  o baclofen 10 mg oral tablet   SIG: TAKE 1 TABLET BY MOUTH ONCE DAILY AT BEDTIME AS NEEDED   DISP: (90) Tablet with 1 refills  Refilled  on 08/25/2020     o bupropion HCl 150 mg oral tablet extended release 24 hr   SIG: TAKE 1 TABLET BY MOUTH ONCE DAILY   DISP: (90) Each with 1 refills  Refilled on 08/25/2020     o diclofenac sodium 75 mg oral tablet,delayed release (DR/EC)   SIG: TAKE 1 TABLET BY MOUTH TWICE DAILY   DISP: (180) Each with 1 refills  Refilled on 08/25/2020     o ferrous sulfate 325 mg (65 mg iron) oral tablet   SIG: take 1 tablet (325 mg) by oral route 2 times per day for 90 days   DISP: (180) tablets with 1 refills  Refilled on 08/25/2020     o fluoxetine 40 mg oral capsule   SIG: TAKE 1 CAPSULE BY MOUTH ONCE DAILY IN THE MORNING   DISP: (90) Capsule with 1 refills  Refilled on 08/25/2020     o hydrochlorothiazide 25 mg oral tablet   SIG: Take 1 tablet by mouth once daily   DISP: (90) Tablet with 1 refills  Refilled on 08/25/2020     o Jardiance 10 mg oral tablet   SIG: TAKE 1 TABLET BY MOUTH ONCE DAILY IN THE MORNING FOR 90 DAYS   DISP: (90) Tablet with 1 refills  Refilled on 08/25/2020     o losartan 100 mg oral tablet   SIG: TAKE 1 TABLET BY MOUTH ONCE DAILY   DISP: (90) Tablet with 1 refills  Refilled on 08/25/2020     o metformin 1,000 mg oral tablet   SIG: TAKE 1 TABLET BY MOUTH TWICE DAILY WITH MORNING AND EVENING MEALS   DISP: (180) Each with 1 refills  Refilled on 08/25/2020     o pravastatin 20 mg oral tablet   SIG: TAKE 1 TABLET BY MOUTH ONCE DAILY   DISP: (90) Each with 1 refills  Refilled on 08/25/2020     o Pravachol 20 mg oral tablet   SIG: take 1 tablet (20 mg) by oral route once daily for 90 days   DISP: (90) Tablet with 1 refills  Discontinued on 08/25/2020     o Wellbutrin  mg oral tablet extended release 24 hr   SIG: take 1 tablet (150 mg) by oral route once daily for 90 days   DISP: (90) tablets with 1 refills  Discontinued on 08/25/2020     o Medications have been Reconciled  o Transition of Care or Provider Policy  · Instructions  o Patient was given an SSRI/SSNRI medication and warned of possible side  "effects of the medication including potential for increased risk of suicidal thoughts and feelings. Patient was instructed that if they begin to exhibit any of these effects they will discontinue the medication immediately and contact our office or the ER ASAP.  o Patient agrees to a \"No Self Harm\" contract. Patient will either call us, 911, ER, [facility], [facility].  o Daily foot care. Avoid walking barefoot. Annual Dilated Eye Exam.  o Discussed with patient blood pressure monitoring, hemoglobin A1C levels need to be below 7.0, and LDL (Lipid) goals below 70.  o Advised that cheeses and other sources of dairy fats, animal fats, fast food, and the extras (candy, pastries, pies, doughnuts and cookies) all contain LDL raising nutrients. Advised to increase fruits, vegetables, whole grains, and to monitor portion sizes.   o Depression Screen completed and scanned into the EMR under the designated folder within the patient's documents.  o Today's PHQ-9 result is __2_  o The provider screening met the required time of 15 minutes.  o Take all medications as prescribed/directed.  o Patient was educated/instructed on their diagnosis, treatment and medications prior to discharge from the clinic today.  o Patient counseled to reduce calorie intake.  o Patient was instructed to exercise regularly.  o Patient instructed to seek medical attention urgently for new or worsening symptoms.  o Bring all medicines with their bottles to each office visit.  · Disposition  o Return Visit Request in/on 6 months +/- 2 weeks ([id number]).            Electronically Signed by: VANESSA Laboy -Author on August 25, 2020 11:40:23 AM  "

## 2021-05-13 NOTE — PROGRESS NOTES
Progress Note      Patient Name: Adelita Garcia   Patient ID: [id number]   Sex: Female   YOB: 1955    Primary Care Provider: Nesha MENDEZ   Referring Provider: Nesha MENDEZ    Visit Date: May 20, 2020    Provider: VANESSA Laboy   Location: [facility]   Location Address: [address] [place] [address], [address]  [place]  [id number]   Location Phone: [phone]          Chief Complaint  · Follow up      History Of Present Illness  Adelita Garcia is a 64 year old /White female who presents for evaluation and treatment of:      Pt is here for her follow up and to discuss lab results. She is doing well, no issues and does not need refills at this time.       Past Medical History  Disease Name Date Onset Notes   Anxiety --  --    Arthritis 11/19/2015 --    Bone Density Screening 10/17/2019 normal   Diabetic Eye Exam Last Completed 7/22/2019 scanned in chart   Eye exam, routine 1/2019 Yrly Eye Exam   Hyperlipidemia --  --    Hypertension --  --    Hypertriglyceridemia 09/15/2017 --    Joint pain --  --    Major depressive disorder 09/11/2018 --    Pap smear for cervical cancer screening 2008 No Longer   Primary insomnia 11/19/2015 --    Screening Mammogram 1/15/2020 normal   Vitamin D deficiency 06/11/2018 --          Past Surgical History  Procedure Name Date Notes   Cesarian Section 1984 and 1989 --    Colonoscopy 07/09/2012 Dr. Harris- normal repeat in 10 years   Dilation and curretage 2000 and 2007 --    Hysterectomy 2007 --          Medication List  Name Date Started Instructions   Aspir-81 81 mg oral tablet,delayed release (DR/EC)  take 1 tablet (81 mg) by oral route once daily   baclofen 10 mg oral tablet 04/14/2020 TAKE 1 TABLET BY MOUTH ONCE DAILY AT BEDTIME AS NEEDED   diclofenac sodium 75 mg oral tablet,delayed release (DR/EC) 10/17/2019 take 1 tablet (75 mg) by oral route 2 times per day for 90 days   ferrous sulfate 325 mg (65 mg iron) oral tablet 11/05/2019 take  1 tablet (325 mg) by oral route 2 times per day for 90 days   fluoxetine 40 mg oral capsule 10/17/2019 TAKE 1 CAPSULE BY MOUTH ONCE DAILY IN THE MORNING   hydrochlorothiazide 25 mg oral tablet 2020 TAKE 1 TABLET BY MOUTH ONCE DAILY   Jardiance 10 mg oral tablet 2020 take 1 tablet (10 mg) by oral route once daily in the morning for 90 days   losartan 100 mg oral tablet 2020 TAKE 1 TABLET BY MOUTH ONCE DAILY   metformin 1,000 mg oral tablet 2020 TAKE 1 TABLET BY MOUTH TWICE DAILY WITH MORNING AND EVENING MEALS   multivitamin oral  --    Pravachol 20 mg oral tablet 2019 take 1 tablet (20 mg) by oral route once daily for 90 days   pravastatin 20 mg oral tablet 2020 TAKE 1 TABLET BY MOUTH ONCE DAILY   Vitamin D3 2,000 unit oral tablet  take 1 tablet by oral route daily   Wellbutrin  mg oral tablet extended release 24 hr 2019 take 1 tablet (150 mg) by oral route once daily for 90 days         Allergy List  Allergen Name Date Reaction Notes   NO KNOWN DRUG ALLERGIES --  --  --          Family Medical History  Disease Name Relative/Age Notes   Stroke Mother/   Mother   Heart Disease Brother/  Father/  Mother/   Father; Mother; Brother   Hypertension Father/  Mother/   --    - No Family History of Colorectal Cancer  --    Family history of certain chronic disabling diseases; arthritis Brother/   Brother         Reproductive History  Menstrual   Age Menarche: 14   Pregnancy Summary   Total Pregnancies: 2 Full Term: 2 Premature: 0   Ab Induced: 0 Ab Spontaneous: 0 Ectopics: 0   Multiples: 0 Livin         Social History  Finding Status Start/Stop Quantity Notes   Active but no formal exercise --  --/-- --  --    Alcohol Never --/-- --  --     --  --/-- --  --    No known infection risk --  --/-- --  --    Recreational Drug Use Never --/-- --  no   Tobacco Never --/-- --  never smoker   Working --  --/-- --  --          Immunizations  NameDate Admin Mfg Trade Name Lot  "Number Route Inj VIS Given VIS Publication   Fthgwenjg89/17/2019 [facility]C Fluzone Quadrivalent [id number] IM LD 10/17/2019    Comments: Pt tolerated injection well   Valfovvzf64/17/2019 [facility]C Fluzone Quadrivalent [id number] IM LD 10/17/2019    Comments: Pt tolerated injection well   Tsdikdekg59/11/2018 [facility] Fluzone Quadrivalent [id number] IM RD 12/11/2018 08/07/2015   Comments: Pt tolerated well   Gibsieisv13/31/2017 [facility] Fluzone Quadrivalent [id number] IM RD 10/31/2017 08/07/2015   Comments: Injection given. Pt tolerated well.   Tdap05/09/2017 [facility]B BOOSTRIX [id number] IM RD 05/09/2017 01/24/2012   Comments: Injection given. Pt tolerated well.         Review of Systems  · Constitutional  o Denies  o : fever, fatigue, weight loss, weight gain  · Cardiovascular  o Denies  o : lower extremity edema, claudication, chest pressure, palpitations  · Respiratory  o Denies  o : shortness of breath, wheezing, cough, hemoptysis, dyspnea on exertion  · Gastrointestinal  o Denies  o : nausea, vomiting, diarrhea, constipation, abdominal pain  · Psychiatric  o Admits  o : anxiety, depression      Vitals  Date Time BP Position Site L\R Cuff Size HR RR TEMP (F) WT  HT  BMI kg/m2 BSA m2 O2 Sat        07/17/2019 11:42 /81 Sitting    88 - R   224lbs 2oz 5'  3\" 39.7 2.13 97 %    10/17/2019 01:43 /83 Sitting    93 - R   228lbs 0oz    92 %    01/21/2020 11:31 /80 Sitting    90 - R   228lbs 0oz 5'  3\" 40.39 2.14 94 %    02/20/2020 11:04 /76 Sitting    84 - R   224lbs 8oz 5'  3\" 39.77 2.13 92 %    05/20/2020 11:05 /84 Sitting    86 - R   221lbs 8oz 5'  3\" 39.24 2.11 95 %          Physical Examination  · Constitutional  o Appearance  o : well-nourished, well developed, alert, in no acute distress  · Ears, Nose, Mouth and Throat  o Ears  o :   § External Ears  § : appearance within normal limits, no lesions present  § Otoscopic Examination  § : tympanic membrane appearance within normal limits bilaterally without perforations, mobility " normal  o Nose  o :   § External Nose  § : normal stucture noted.  § Intranasal Exam  § : no swelling, reddness, turbs normal salome.  o Oral Cavity  o :   § Oral Mucosa  § : oral mucosa normal without pallor or cyanosis  § Lips  § : lip appearance normal  § Teeth  § : normal dentition for age  § Gums  § : gums pink, non-swollen, no bleeding present  § Tongue  § : tongue appearance normal  § Palate  § : hard palate normal, soft palate appearance normal  o Throat  o :   § Oropharynx  § : no inflammation or lesions present, tonsils within normal limits  · Respiratory  o Respiratory Effort  o : breathing unlabored  o Auscultation of Lungs  o : normal breath sounds throughout  · Cardiovascular  o Heart  o :   § Auscultation of Heart  § : regular rate and rhythm, no murmurs, gallops or rubs  § Palpation of Heart  § : normal apical impulse, no cardiac thrill present  o Peripheral Vascular System  o :   § Extremities  § : no cyanosis, clubbing or edema; less than 2 second refill noted  · Gastrointestinal  o Abdominal Examination  o : abdomen nontender to palpation, tone normal without rigidity or guarding, no masses present, abdominal contour scaphoid  o Liver and spleen  o : no hepatomegaly present, liver nontender to palpation, spleen not palpable  · Skin and Subcutaneous Tissue  o General Inspection  o : no rashes or lesions present, no areas of discoloration  · Neurologic  o Mental Status Examination  o :   § Orientation  § : grossly oriented to person, place and time  o Cranial Nerves  o : cranial nerves intact and symmetric throughout  · Psychiatric  o Mood and Affect  o : mood normal, affect appropriate, denies any SI/HI          Assessment  · Anemia     285.9/D64.9  · Diabetes mellitus, type 2     250.00/E11.9  · Major depressive disorder     296.20/F32.2  · Vitamin D deficiency     268.9/E55.9  · Arthritis     716.90/M19.90  · Hypertriglyceridemia     272.1/E78.1  · Primary  insomnia     307.42/F51.01  · Anxiety     300.02/F41.1  · Hypertension     401.9/I10  · Screening for thyroid disorder     V77.0/Z13.29  · Routine lab draw     V72.60/Z01.89      Plan  · Orders  o CBC with Auto Diff [facility] (98028) - - 08/20/2020  o CMP [facility] (02977) - - 08/20/2020  o Hgb A1c [facility] (32938) - - 08/20/2020  o Thyroid Profile (00193, 58165, THYII) - - 08/20/2020  o Vitamin D (25-Hydroxy) Level (81099) - - 08/20/2020  o ACO-39: Current medications updated and reviewed () - - 05/20/2020  o ACO-20: Screening Mammography documented and reviewed () - - 05/20/2020  o ACO-19: Colorectal cancer screening results documented and reviewed (3017F) - - 05/20/2020  o ACO-41: Dilated Diabetic eye exam completed this year and results in chart/reviewed (2022F) - - 05/20/2020  o Iron Profile (Iron 91612 TIBC 35823 and Transferrin 96142) (IRONP) - - 08/20/2020  · Medications  o bupropion HCl 150 mg oral tablet extended release 24 hr   SIG: TAKE 1 TABLET BY MOUTH ONCE DAILY   DISP: (90) Each with 0 refills  Discontinued on 05/20/2020     o iron 325 mg (65 mg iron) oral tablet   SIG: TAKE 1 TABLET BY MOUTH TWICE DAILY   DISP: (180) Tablet with 1 refills  Discontinued on 05/20/2020     o Transition of Care or Provider Policy  · Instructions  o Daily foot care. Avoid walking barefoot. Annual Dilated Eye Exam.  o Discussed with patient blood pressure monitoring, hemoglobin A1C levels need to be below 7.0, and LDL (Lipid) goals below 70.  o Patient was educated/instructed on their diagnosis, treatment and medications prior to discharge from the clinic today.  o Patient counseled to reduce calorie intake.  o Patient was instructed to exercise regularly.  o Patient instructed to seek medical attention urgently for new or worsening symptoms.  o Call the office with any concerns or questions.  · Disposition  o Return Visit Request in/on 3 months +/- 2 weeks ([id number]).            Electronically Signed by: VANESSA Laboy  -Author on May 20, 2020 11:18:27 AM

## 2021-05-14 VITALS
HEART RATE: 94 BPM | HEIGHT: 63 IN | WEIGHT: 216 LBS | BODY MASS INDEX: 38.27 KG/M2 | SYSTOLIC BLOOD PRESSURE: 150 MMHG | OXYGEN SATURATION: 97 % | DIASTOLIC BLOOD PRESSURE: 63 MMHG

## 2021-05-14 VITALS
WEIGHT: 220.25 LBS | OXYGEN SATURATION: 96 % | HEIGHT: 63 IN | BODY MASS INDEX: 39.02 KG/M2 | SYSTOLIC BLOOD PRESSURE: 135 MMHG | HEART RATE: 87 BPM | DIASTOLIC BLOOD PRESSURE: 60 MMHG

## 2021-05-14 VITALS
HEART RATE: 88 BPM | OXYGEN SATURATION: 96 % | DIASTOLIC BLOOD PRESSURE: 57 MMHG | BODY MASS INDEX: 38.36 KG/M2 | HEIGHT: 63 IN | SYSTOLIC BLOOD PRESSURE: 138 MMHG | WEIGHT: 216.5 LBS

## 2021-05-14 VITALS
SYSTOLIC BLOOD PRESSURE: 143 MMHG | HEIGHT: 63 IN | WEIGHT: 216 LBS | BODY MASS INDEX: 38.27 KG/M2 | HEART RATE: 87 BPM | DIASTOLIC BLOOD PRESSURE: 67 MMHG | OXYGEN SATURATION: 97 %

## 2021-05-14 VITALS
BODY MASS INDEX: 38.32 KG/M2 | TEMPERATURE: 98.4 F | DIASTOLIC BLOOD PRESSURE: 63 MMHG | SYSTOLIC BLOOD PRESSURE: 141 MMHG | HEART RATE: 83 BPM | WEIGHT: 216.25 LBS | OXYGEN SATURATION: 96 % | HEIGHT: 63 IN

## 2021-05-14 NOTE — PROGRESS NOTES
Progress Note      Patient Name: Adelita Garcia   Patient ID: [id number]   Sex: Female   YOB: 1955    Primary Care Provider: Nesha MENDEZ   Referring Provider: Nesha MENDEZ    Visit Date: April 13, 2021    Provider: VANESSA Laboy   Location: [facility]   Location Address: [address] [place] [address], [address]  [place]  [id number]   Location Phone: [phone]          Chief Complaint  · Possible UTI  · Possible yeast inf      History Of Present Illness  Adelita Garcia is a 65 year old /White female who presents for evaluation and treatment of:      Pt has c/o possible yeast infection. Pt was treated with Diflucan on 3/23/21. Pt is still having itching and burning. She also has a rash around the creases of her legs and under her belly. Pt has tried Aquaphor, Neosporin, and aveno with no relief. Pt was tested for UTI and was negative.       Past Medical History  Disease Name Date Onset Notes   Anemia --  --    Anxiety disorder --  --    Arthritis 11/19/2015 --    Bone Density Screening 10/17/2019 normal   Diabetes mellitus, type 2 --  --    Diabetic Eye Exam Last Completed 7/22/2019 scanned in chart   Essential hypertension --  --    Eye exam, routine 1/2019 Yrly Eye Exam   Hyperlipidemia --  --    Hypertriglyceridemia 09/15/2017 --    Insomnia 11/19/15 --    Joint pain --  --    Major depressive disorder 09/11/2018 --    Pap smear for cervical cancer screening 2008 No Longer   Screening Mammogram 1/15/2020 normal   Vitamin D deficiency 06/11/2018 --          Past Surgical History  Procedure Name Date Notes   Cesarian Section 1984 and 1989 --    Colonoscopy 07/09/2012 Dr. Harris- normal repeat in 10 years   Dilation and curretage 2000 and 2007 --    Hysterectomy 2007 --          Medication List  Name Date Started Instructions   Aspir-81 81 mg oral tablet,delayed release (/EC)  take 1 tablet (81 mg) by oral route once daily   baclofen 10 mg oral  tablet 2021 TAKE 1 TABLET BY MOUTH ONCE DAILY AT BEDTIME AS NEEDED   bupropion HCl 150 mg oral tablet extended release 24 hr 2021 TAKE 1 TABLET BY MOUTH ONCE DAILY   diclofenac sodium 75 mg oral tablet,delayed release (DR/EC) 2021 TAKE 1 TABLET BY MOUTH TWICE DAILY   Diflucan 150 mg oral tablet 2021 take 1 tablet (150 mg) by oral route once   ferrous sulfate 325 mg (65 mg iron) oral tablet 2021 take 1 tablet (325 mg) by oral route 2 times per day for 90 days   fluoxetine 40 mg oral capsule 2021 TAKE 1 CAPSULE BY MOUTH ONCE DAILY IN THE MORNING   hydrochlorothiazide 25 mg oral tablet 2021 Take 1 tablet by mouth once daily   Jardiance 10 mg oral tablet 2021 TAKE 1 TABLET BY MOUTH ONCE DAILY IN THE MORNING FOR 90 DAYS   losartan 100 mg oral tablet 2021 TAKE 1 TABLET BY MOUTH ONCE DAILY   metformin 1,000 mg oral tablet 2021 TAKE 1 TABLET BY MOUTH TWICE DAILY WITH MORNING AND EVENING MEALS   multivitamin oral  --    pravastatin 20 mg oral tablet 2021 TAKE 1 TABLET BY MOUTH ONCE DAILY   Vitamin D3 2,000 unit oral tablet  take 1 tablet by oral route daily         Allergy List  Allergen Name Date Reaction Notes   NO KNOWN DRUG ALLERGIES --  --  --          Family Medical History  Disease Name Relative/Age Notes   Stroke Mother/   Mother   Heart Disease Brother/  Father/  Mother/   Father; Mother; Brother   Hypertension Father/  Mother/   --    - No Family History of Colorectal Cancer  --    Family history of certain chronic disabling diseases; arthritis Brother/   Brother         Reproductive History  Menstrual   Age Menarche: 14   Pregnancy Summary   Total Pregnancies: 2 Full Term: 2 Premature: 0   Ab Induced: 0 Ab Spontaneous: 0 Ectopics: 0   Multiples: 0 Livin         Social History  Finding Status Start/Stop Quantity Notes   Active but no formal exercise --  --/-- --  --    Alcohol Never --/-- --  --     --  --/-- --  --    No known infection  "risk --  --/-- --  --    Recreational Drug Use Never --/-- --  no   Tobacco Never --/-- --  never smoker   Working --  --/-- --  --          Immunizations  NameDate Admin Mfg Trade Name Lot Number Route Inj VIS Given VIS Publication   [id number]/01/2020 NE Not Entered  NE NE     Comments:    Tdap05/09/2017 SKB BOOSTRIX [id number] IM RD 05/09/2017 01/24/2012   Comments: Injection given. Pt tolerated well.         Review of Systems  · Constitutional  o Denies  o : fever, fatigue, weight loss, weight gain  · Cardiovascular  o Denies  o : lower extremity edema, claudication, chest pressure, palpitations  · Respiratory  o Denies  o : shortness of breath, wheezing, cough, hemoptysis, dyspnea on exertion  · Gastrointestinal  o Denies  o : nausea, vomiting, diarrhea, constipation, abdominal pain  · Integument  o Admits  o : rash, itching  · Psychiatric  o Admits  o : anxiety, depression      Vitals  Date Time BP Position Site L\R Cuff Size HR RR TEMP (F) WT  HT  BMI kg/m2 BSA m2 O2 Sat FR L/min FiO2        04/13/2021 01:58 /63 Sitting    94 - R   216lbs 0oz 5'  3\" 38.26 2.09 97 %  21%          Physical Examination  · Constitutional  o Appearance  o : well-nourished, well developed, alert, in no acute distress  · Respiratory  o Respiratory Effort  o : breathing unlabored  o Auscultation of Lungs  o : normal breath sounds throughout  · Cardiovascular  o Heart  o :   § Auscultation of Heart  § : regular rate and rhythm, no murmurs, gallops or rubs  § Palpation of Heart  § : normal apical impulse, no cardiac thrill present  o Peripheral Vascular System  o :   § Extremities  § : no cyanosis, clubbing or edema; less than 2 second refill noted  · Genitourinary  o External Genitalia  o : no lesions present, outer labia and salome groin area with excoriation, excoriation also noted under flap of abdominal skin of lower abdomen, groin and outer labia cultured  o Vagina  o : normal vaginal vault, no discharge present, no inflammatory " lesions present, no masses present  o Urethra  o :   § Urethral Meatus  § : no inflammation present  § Urethral Body  § : urethra palpation normal  o Perineum  o : perineum within normal limits  · Skin and Subcutaneous Tissue  o General Inspection  o : no rashes or lesions present, no areas of discoloration  · Neurologic  o Mental Status Examination  o :   § Orientation  § : grossly oriented to person, place and time  o Cranial Nerves  o : cranial nerves intact and symmetric throughout  · Psychiatric  o Mood and Affect  o : mood normal, affect appropriate          Results  · In-Office Procedures  o Lab procedure  § IOP - Urinalysis without Microscopy (Clinitek) [facility] ([address])   § Color Ur: Yellow   § Clarity Ur: Slightly cloudy   § Glucose Ur Ql Strip: 500 mg/dL   § Bilirub Ur Ql Strip: Negative   § Ketones Ur Ql Strip: Negative   § Sp Gr Ur Qn: 1.015   § Hgb Ur Ql Strip: Negative   § pH Ur-LsCnc: 7.0   § Prot Ur Ql Strip: Negative   § Urobilinogen Ur Strip-mCnc: 0.2 E.U./dL   § Nitrite Ur Ql Strip: Negative   § WBC Est Ur Ql Strip: Trace       Assessment  · Anxiety disorder     300.00/F41.9  · Depression     311/F32.9  · Diabetes mellitus, type 2     250.00/E11.9  · Essential hypertension     401.9/I10  · Hyperlipidemia     272.4/E78.5  · Vaginal itching     698.1/N89.8  · Vaginal burning     625.8/N94.9  · Rash     782.1/R21  · Malodorous urine     791.9/R82.90      Plan  · Orders  o ACO-39: Current medications updated and reviewed (1159F, ) - - 04/13/2021  o Vag Screen Infectious agent detection by nucleic acid DNA or RNA (64961) - 698.1/N89.8, 625.8/N94.9 - 04/13/2021  o Vaginal culture (04993) - 698.1/N89.8, 625.8/N94.9 - 04/13/2021  o Urine Culture (Clean Catch) [facility] (75412) - 791.9/R82.90 - 04/13/2021  · Medications  o Diflucan 150 mg oral tablet   SIG: take 1 tablet (150 mg) by oral route once   DISP: (1) Tablet with 0 refills  Refilled on 04/13/2021     o Medications have been Reconciled  o Transition of  "Care or Provider Policy  · Instructions  o Patient agrees to a \"No Self Harm\" contract. Patient will either call us, 911, ER, [facility], [facility].  o Patient agrees to a \"No Self Harm\" contract. Patient will either call us, 911, ER, [facility], [facility].  o Discussed with patient blood pressure monitoring, hemoglobin A1C levels need to be below 7.0, and LDL (Lipid) goals below 70.  o Advised that cheeses and other sources of dairy fats, animal fats, fast food, and the extras (candy, pastries, pies, doughnuts and cookies) all contain LDL raising nutrients. Advised to increase fruits, vegetables, whole grains, and to monitor portion sizes.   o Patient was educated/instructed on their diagnosis, treatment and medications prior to discharge from the clinic today.  o Patient instructed to seek medical attention urgently for new or worsening symptoms.  o Call the office with any concerns or questions.  o script for Magic Butt cream also called into pharmacy  · Disposition  o Call or Return if symptoms worsen or persist.            Electronically Signed by: VANESSA Laboy -Author on April 13, 2021 02:17:44 PM  "

## 2021-05-14 NOTE — PROGRESS NOTES
Progress Note      Patient Name: Adelita Garcia   Patient ID: [id number]   Sex: Female   YOB: 1955    Primary Care Provider: Nesha MENDEZ   Referring Provider: Nesha MENDEZ    Visit Date: March 23, 2021    Provider: VANESSA Laboy   Location: [facility]   Location Address: [address] [place] [address], [address]  [place], [place]  [id number]   Location Phone: [phone]          Chief Complaint  · yeast infection       History Of Present Illness  Adelita Garcia is a 65 year old /White female who presents for evaluation and treatment of:      patient states she was on antibiotics about 3 weeks ago and for about the past 2 weeks she has had vaginal itching but no discharge, she has used OTC Monistat without improvement     she is also c/o very dry hands on top only, she states she has not changed anything or done anything different, she has tried Gold Bond and Aquaphor but neither has helped    she is also having problems going to sleep and staying asleep, she has been on Ambien in the past but made her groggy she is wondering about starting Melatonin.       Past Medical History  Disease Name Date Onset Notes   Anemia --  --    Anxiety disorder --  --    Arthritis 11/19/2015 --    Bone Density Screening 10/17/2019 normal   Diabetes mellitus, type 2 --  --    Diabetic Eye Exam Last Completed 7/22/2019 scanned in chart   Essential hypertension --  --    Eye exam, routine 1/2019 Yrly Eye Exam   Hyperlipidemia --  --    Hypertriglyceridemia 09/15/2017 --    Insomnia 11/19/15 --    Joint pain --  --    Major depressive disorder 09/11/2018 --    Pap smear for cervical cancer screening 2008 No Longer   Screening Mammogram 1/15/2020 normal   Vitamin D deficiency 06/11/2018 --          Past Surgical History  Procedure Name Date Notes   Cesarian Section 1984 and 1989 --    Colonoscopy 07/09/2012 Dr. Harris- normal repeat in 10 years   Dilation and curretage 2000 and 2007  --    Hysterectomy  --          Medication List  Name Date Started Instructions   Aspir-81 81 mg oral tablet,delayed release (DR/EC)  take 1 tablet (81 mg) by oral route once daily   baclofen 10 mg oral tablet 2021 TAKE 1 TABLET BY MOUTH ONCE DAILY AT BEDTIME AS NEEDED   bupropion HCl 150 mg oral tablet extended release 24 hr 2021 TAKE 1 TABLET BY MOUTH ONCE DAILY   diclofenac sodium 75 mg oral tablet,delayed release (DR/EC) 2021 TAKE 1 TABLET BY MOUTH TWICE DAILY   ferrous sulfate 325 mg (65 mg iron) oral tablet 2021 take 1 tablet (325 mg) by oral route 2 times per day for 90 days   fluoxetine 40 mg oral capsule 2021 TAKE 1 CAPSULE BY MOUTH ONCE DAILY IN THE MORNING   hydrochlorothiazide 25 mg oral tablet 2021 Take 1 tablet by mouth once daily   Jardiance 10 mg oral tablet 2021 TAKE 1 TABLET BY MOUTH ONCE DAILY IN THE MORNING FOR 90 DAYS   losartan 100 mg oral tablet 2021 TAKE 1 TABLET BY MOUTH ONCE DAILY   metformin 1,000 mg oral tablet 2021 TAKE 1 TABLET BY MOUTH TWICE DAILY WITH MORNING AND EVENING MEALS   multivitamin oral  --    pravastatin 20 mg oral tablet 2021 TAKE 1 TABLET BY MOUTH ONCE DAILY   Vitamin D3 2,000 unit oral tablet  take 1 tablet by oral route daily         Allergy List  Allergen Name Date Reaction Notes   NO KNOWN DRUG ALLERGIES --  --  --          Family Medical History  Disease Name Relative/Age Notes   Stroke Mother/   Mother   Heart Disease Brother/  Father/  Mother/   Father; Mother; Brother   Hypertension Father/  Mother/   --    - No Family History of Colorectal Cancer  --    Family history of certain chronic disabling diseases; arthritis Brother/   Brother         Reproductive History  Menstrual   Age Menarche: 14   Pregnancy Summary   Total Pregnancies: 2 Full Term: 2 Premature: 0   Ab Induced: 0 Ab Spontaneous: 0 Ectopics: 0   Multiples: 0 Livin         Social History  Finding Status Start/Stop Quantity Notes  "  Active but no formal exercise --  --/-- --  --    Alcohol Never --/-- --  --     --  --/-- --  --    No known infection risk --  --/-- --  --    Recreational Drug Use Never --/-- --  no   Tobacco Never --/-- --  never smoker   Working --  --/-- --  --          Immunizations  NameDate Admin Mfg Trade Name Lot Number Route Inj VIS Given VIS Publication   [id number]/01/2020 NE Not Entered  NE NE     Comments:    Tdap05/09/2017 SKB BOOSTRIX [id number] IM RD 05/09/2017 01/24/2012   Comments: Injection given. Pt tolerated well.         Review of Systems  · Constitutional  o Admits  o : insomnia  o Denies  o : fever, fatigue, weight loss, weight gain  · Cardiovascular  o Denies  o : lower extremity edema, claudication, chest pressure, palpitations  · Respiratory  o Denies  o : shortness of breath, wheezing, cough, hemoptysis, dyspnea on exertion  · Gastrointestinal  o Denies  o : nausea, vomiting, diarrhea, constipation, abdominal pain  · Genitourinary  o Admits  o : vaginal itching  o Denies  o : vaginal discharge  · Integument  o Admits  o : rash, skin dryness  · Psychiatric  o Admits  o : anxiety, depression      Vitals  Date Time BP Position Site L\R Cuff Size HR RR TEMP (F) WT  HT  BMI kg/m2 BSA m2 O2 Sat FR L/min FiO2 HC       08/25/2020 11:25 /60 Sitting    87 - R   220lbs 4oz 5'  3\" 39.02 2.11 96 %  21%    02/25/2021 11:03 /57 Sitting    88 - R   216lbs 8oz 5'  3\" 38.35 2.09 96 %  21%    03/23/2021 10:50 /63 Sitting    83 - R  98.4 216lbs 4oz 5'  3\" 38.31 2.09 96 %  21%          Physical Examination  · Constitutional  o Appearance  o : well-nourished, well developed, alert, in no acute distress  · Ears, Nose, Mouth and Throat  o Ears  o :   § External Ears  § : appearance within normal limits, no lesions present  § Otoscopic Examination  § : tympanic membrane appearance within normal limits bilaterally without perforations, mobility normal  o Nose  o :   § External Nose  § : normal stucture " noted.  § Intranasal Exam  § : no swelling, reddness, turbs normal salome.  o Oral Cavity  o :   § Oral Mucosa  § : oral mucosa normal without pallor or cyanosis  § Lips  § : lip appearance normal  § Teeth  § : normal dentition for age  § Gums  § : gums pink, non-swollen, no bleeding present  § Tongue  § : tongue appearance normal  § Palate  § : hard palate normal, soft palate appearance normal  o Throat  o :   § Oropharynx  § : no inflammation or lesions present, tonsils within normal limits  · Respiratory  o Respiratory Effort  o : breathing unlabored  o Auscultation of Lungs  o : normal breath sounds throughout  · Cardiovascular  o Heart  o :   § Auscultation of Heart  § : regular rate and rhythm, no murmurs, gallops or rubs  § Palpation of Heart  § : normal apical impulse, no cardiac thrill present  o Peripheral Vascular System  o :   § Extremities  § : no cyanosis, clubbing or edema; less than 2 second refill noted  · Skin and Subcutaneous Tissue  o General Inspection  o : no rashes or lesions present, no areas of discoloration, dryness to top of salome hands  · Neurologic  o Mental Status Examination  o :   § Orientation  § : grossly oriented to person, place and time  o Cranial Nerves  o : cranial nerves intact and symmetric throughout  · Psychiatric  o Mood and Affect  o : mood normal, affect appropriate          Assessment  · Anxiety disorder     300.00/F41.9  · Depression     311/F32.9  · Dermatitis     692.9/L30.9  · Diabetes mellitus, type 2     250.00/E11.9  · Essential hypertension     401.9/I10  · Hyperlipidemia     272.4/E78.5  · Insomnia     780.52/G47.00  · Yeast infection     112.9/B37.9      Plan  · Orders  o CBC with Auto Diff [facility] (04480) - - 03/23/2021  o CMP [facility] (08356) - - 03/23/2021  o Hgb A1c [facility] (61288) - - 03/23/2021  o Lipid Panel [facility] (56881) - - 03/23/2021  o Thyroid Profile (93214, 35304, THYII) - - 03/23/2021  o Vitamin D (25-Hydroxy) Level (02359) - - 03/23/2021  o ACO-14: Influenza  immunization administered or previously received [facility] () - - 03/23/2021  o ACO-39: Current medications updated and reviewed (1159F, ) - - 03/23/2021  · Medications  o Diflucan 150 mg oral tablet   SIG: take 1 tablet (150 mg) by oral route once   DISP: (1) Tablet with 0 refills  Prescribed on 03/23/2021     o Medications have been Reconciled  o Transition of Care or Provider Policy  · Instructions  o Advised that cheeses and other sources of dairy fats, animal fats, fast food, and the extras (candy, pastries, pies, doughnuts and cookies) all contain LDL raising nutrients. Advised to increase fruits, vegetables, whole grains, and to monitor portion sizes.   o Patient was educated/instructed on their diagnosis, treatment and medications prior to discharge from the clinic today.  o Patient instructed to seek medical attention urgently for new or worsening symptoms.  o Call the office with any concerns or questions.  o advised to try Melatonin for insomnia  o advised to try moisturizer and OTC hydrocortisone for itching on her hands  o advised if vaginal itching not resolving with Diflucan pt to f/u for vaginal swab  o Electronically Identified Patient Education Materials Provided Electronically  · Disposition  o Call or Return if symptoms worsen or persist.            Electronically Signed by: VANESSA Laboy -Author on March 23, 2021 11:18:48 AM

## 2021-05-14 NOTE — PROGRESS NOTES
Progress Note      Patient Name: Adelita Garcia   Patient ID: [id number]   Sex: Female   YOB: 1955    Primary Care Provider: Nesha MENDEZ   Referring Provider: Nesha MENDEZ    Visit Date: February 25, 2021    Provider: VANESSA Laboy   Location: [facility]   Location Address: [address] [place] [address], [address]  [place], [place]  [id number]   Location Phone: [phone]          Chief Complaint  · 6 mo f/u      History Of Present Illness  Adelita Garcia is a 65 year old /White female who presents for evaluation and treatment of:      Pt is a 6 mo f/u. No issues or concerns at this time.    Pt is due labs    Pt is due mammogram.    Pt had flu vaccine this season.     Pt had DM eye exam at [facility].       Past Medical History  Disease Name Date Onset Notes   Anemia --  --    Anxiety disorder --  --    Arthritis 11/19/2015 --    Bone Density Screening 10/17/2019 normal   Diabetes mellitus, type 2 --  --    Diabetic Eye Exam Last Completed 7/22/2019 scanned in chart   Essential hypertension --  --    Eye exam, routine 1/2019 Yrly Eye Exam   Hyperlipidemia --  --    Hypertriglyceridemia 09/15/2017 --    Insomnia 11/19/15 --    Joint pain --  --    Major depressive disorder 09/11/2018 --    Pap smear for cervical cancer screening 2008 No Longer   Screening Mammogram 1/15/2020 normal   Vitamin D deficiency 06/11/2018 --          Past Surgical History  Procedure Name Date Notes   Cesarian Section 1984 and 1989 --    Colonoscopy 07/09/2012 Dr. Harris- normal repeat in 10 years   Dilation and curretage 2000 and 2007 --    Hysterectomy 2007 --          Medication List  Name Date Started Instructions   Aspir-81 81 mg oral tablet,delayed release (/EC)  take 1 tablet (81 mg) by oral route once daily   baclofen 10 mg oral tablet 02/25/2021 TAKE 1 TABLET BY MOUTH ONCE DAILY AT BEDTIME AS NEEDED   bupropion HCl 150 mg oral tablet extended release 24 hr 02/25/2021  TAKE 1 TABLET BY MOUTH ONCE DAILY   diclofenac sodium 75 mg oral tablet,delayed release (DR/EC) 2021 TAKE 1 TABLET BY MOUTH TWICE DAILY   ferrous sulfate 325 mg (65 mg iron) oral tablet 2021 take 1 tablet (325 mg) by oral route 2 times per day for 90 days   fluoxetine 40 mg oral capsule 2021 TAKE 1 CAPSULE BY MOUTH ONCE DAILY IN THE MORNING   hydrochlorothiazide 25 mg oral tablet 2021 Take 1 tablet by mouth once daily   Jardiance 10 mg oral tablet 2021 TAKE 1 TABLET BY MOUTH ONCE DAILY IN THE MORNING FOR 90 DAYS   losartan 100 mg oral tablet 2021 TAKE 1 TABLET BY MOUTH ONCE DAILY   metformin 1,000 mg oral tablet 2021 TAKE 1 TABLET BY MOUTH TWICE DAILY WITH MORNING AND EVENING MEALS   multivitamin oral  --    pravastatin 20 mg oral tablet 2021 TAKE 1 TABLET BY MOUTH ONCE DAILY   Vitamin D3 2,000 unit oral tablet  take 1 tablet by oral route daily         Allergy List  Allergen Name Date Reaction Notes   NO KNOWN DRUG ALLERGIES --  --  --          Family Medical History  Disease Name Relative/Age Notes   Stroke Mother/   Mother   Heart Disease Brother/  Father/  Mother/   Father; Mother; Brother   Hypertension Father/  Mother/   --    - No Family History of Colorectal Cancer  --    Family history of certain chronic disabling diseases; arthritis Brother/   Brother         Reproductive History  Menstrual   Age Menarche: 14   Pregnancy Summary   Total Pregnancies: 2 Full Term: 2 Premature: 0   Ab Induced: 0 Ab Spontaneous: 0 Ectopics: 0   Multiples: 0 Livin         Social History  Finding Status Start/Stop Quantity Notes   Active but no formal exercise --  --/-- --  --    Alcohol Never --/-- --  --     --  --/-- --  --    No known infection risk --  --/-- --  --    Recreational Drug Use Never --/-- --  no   Tobacco Never --/-- --  never smoker   Working --  --/-- --  --          Immunizations  NameDate Admin Mfg Trade Name Lot Number Route Inj VIS Given VIS  "Publication   [id number]/01/2020 NE Not Entered  NE NE     Comments:    Tdap05/09/2017 SKB BOOSTRIX [id number] IM RD 05/09/2017 01/24/2012   Comments: Injection given. Pt tolerated well.         Review of Systems  · Constitutional  o Denies  o : fever, fatigue, weight loss, weight gain  · Cardiovascular  o Denies  o : lower extremity edema, claudication, chest pressure, palpitations  · Respiratory  o Denies  o : shortness of breath, wheezing, cough, hemoptysis, dyspnea on exertion  · Gastrointestinal  o Denies  o : nausea, vomiting, diarrhea, constipation, abdominal pain  · Psychiatric  o Admits  o : anxiety, depression      Vitals  Date Time BP Position Site L\R Cuff Size HR RR TEMP (F) WT  HT  BMI kg/m2 BSA m2 O2 Sat FR L/min FiO2        02/25/2021 11:03 /57 Sitting    88 - R   216lbs 8oz 5'  3\" 38.35 2.09 96 %  21%          Physical Examination  · Constitutional  o Appearance  o : well-nourished, well developed, alert, in no acute distress  · Respiratory  o Respiratory Effort  o : breathing unlabored  o Auscultation of Lungs  o : normal breath sounds throughout  · Cardiovascular  o Heart  o :   § Auscultation of Heart  § : regular rate and rhythm, no murmurs, gallops or rubs  § Palpation of Heart  § : normal apical impulse, no cardiac thrill present  o Peripheral Vascular System  o :   § Pedal Pulses  § : pulses 2 bilaterally  § Extremities  § : no cyanosis, clubbing or edema; less than 2 second refill noted  · Gastrointestinal  o Abdominal Examination  o : abdomen nontender to palpation, tone normal without rigidity or guarding, no masses present, abdominal contour scaphoid  o Liver and spleen  o : no hepatomegaly present, liver nontender to palpation, spleen not palpable  · Musculoskeletal  o Right Upper Extremity  o :   § Inspection/Palpation  § : no tenderness to palpation  § Range of Motion  § : range of motion normal, no joint crepitus or pain with motion present  o Left Upper Extremity  o : "   § Inspection/Palpation  § : no tenderness to palpation  § Range of Motion  § : range of motion normal, no joint crepitus present, no pain with joint motion  o Right Lower Extremity  o :   § Inspection/Palpation  § : no joint or limb tenderness to palpation  § Range of Motion  § : range of motion normal, no joint crepitations present, no pain on motion  o Left Lower Extremity  o :   § Inspection/Palpation  § : no joint or limb tenderness to palpation  § Range of Motion  § : range of motion normal, no joint crepitations present, no pain on motion  · Skin and Subcutaneous Tissue  o General Inspection  o : no rashes or lesions present, no areas of discoloration  · Neurologic  o Mental Status Examination  o :   § Orientation  § : grossly oriented to person, place and time  o Cranial Nerves  o : cranial nerves intact and symmetric throughout  · Psychiatric  o Mood and Affect  o : mood normal, affect appropriate, denies any SI/HI  · Left DM Foot Exam  o Sensation  o : normal sensory exam perceptible to 10-gram nylon monofilament exam (5/5), vibration and light touch.  o Visual Inspection  o : visual inspection is normal with no signs of breakdown, ulcerations or deformities unless otherwise noted.   o Vascular  o : palpable dorsalis pedis and posterir tibialis pulses present, normal capillary refill  · Right DM Foot Exam  o Sensation  o : normal sensory exam perceptible to 10-gram nylon monofilament exam (5/5), vibration and light touch.  o Visual Inspection  o : visual inspection is normal with no signs of breakdown, ulcerations or deformities unless otherwise noted.   o Vascular  o : palpable dorsalis pedis and posterir tibialis pulses present, normal capillary refill          Assessment  · Anemia     285.9/D64.9  · Anxiety disorder     300.00/F41.9  · Diabetes mellitus, type 2     250.00/E11.9  · Essential hypertension     401.9/I10  · Hyperlipidemia     272.4/E78.5  · Insomnia, unspecified     780.52/G47.00  · Major  depressive disorder     296.20/F32.2  · Vitamin D deficiency     268.9/E55.9  · Visit for screening mammogram     V76.12/Z12.31  · Arthritis     716.90/M19.90  · Hypertriglyceridemia     272.1/E78.1      Plan  · Orders  o Iron panel (iron, TIBC, transferrin saturation) (19952, 12125, 46882) - 285.9/D64.9 - 02/25/2021  o Diabetes 2 Panel (Urine Microalbumin, CMP, Lipid, A1c, ) [facility] (03479, 32512, 08833, 41504) - 250.00/E11.9 - 02/25/2021  o Thyroid Profile (95459, 33916, THYII) - 250.00/E11.9 - 02/25/2021  o Vitamin D Level (89949) - 268.9/E55.9 - 02/25/2021  o Screening Mammography; Bilateral 3D (40145, 30196, ) - V76.12/Z12.31 - 07/13/2021   KIRT 7/13/21 @ 11 am  o CBC with Auto Diff [facility] (18858) - - 02/25/2021  o AZALEA Report (KASPR) - - 02/25/2021  o ACO-14: Influenza immunization administered or previously received [facility] () - - 02/25/2021  o ACO-39: Current medications updated and reviewed (, 1159F) - - 02/25/2021  o Diabetic Dilated Eye Exam Consult with Ophthalmology/Optometry (DMEYE) - - 02/25/2021  o Diabetic Foot (Motor and Sensory) Exam Completed [facility] (, , 2028F) - - 02/25/2021   completed in office  · Medications  o baclofen 10 mg oral tablet   SIG: TAKE 1 TABLET BY MOUTH ONCE DAILY AT BEDTIME AS NEEDED   DISP: (90) Tablet with 1 refills  Refilled on 02/25/2021     o bupropion HCl 150 mg oral tablet extended release 24 hr   SIG: TAKE 1 TABLET BY MOUTH ONCE DAILY   DISP: (90) Each with 1 refills  Refilled on 02/25/2021     o diclofenac sodium 75 mg oral tablet,delayed release (DR/EC)   SIG: TAKE 1 TABLET BY MOUTH TWICE DAILY   DISP: (180) Each with 1 refills  Refilled on 02/25/2021     o ferrous sulfate 325 mg (65 mg iron) oral tablet   SIG: take 1 tablet (325 mg) by oral route 2 times per day for 90 days   DISP: (180) Tablet with 1 refills  Refilled on 02/25/2021     o fluoxetine 40 mg oral capsule   SIG: TAKE 1 CAPSULE BY MOUTH ONCE DAILY IN THE MORNING   DISP: (90) Capsule with 1  "refills  Refilled on 02/25/2021     o hydrochlorothiazide 25 mg oral tablet   SIG: Take 1 tablet by mouth once daily   DISP: (90) Tablet with 1 refills  Refilled on 02/25/2021     o Jardiance 10 mg oral tablet   SIG: TAKE 1 TABLET BY MOUTH ONCE DAILY IN THE MORNING FOR 90 DAYS   DISP: (90) Tablet with 1 refills  Refilled on 02/25/2021     o losartan 100 mg oral tablet   SIG: TAKE 1 TABLET BY MOUTH ONCE DAILY   DISP: (90) Tablet with 1 refills  Refilled on 02/25/2021     o metformin 1,000 mg oral tablet   SIG: TAKE 1 TABLET BY MOUTH TWICE DAILY WITH MORNING AND EVENING MEALS   DISP: (180) Each with 1 refills  Refilled on 02/25/2021     o pravastatin 20 mg oral tablet   SIG: TAKE 1 TABLET BY MOUTH ONCE DAILY   DISP: (90) Each with 1 refills  Refilled on 02/25/2021     o Medications have been Reconciled  o Transition of Care or Provider Policy  · Instructions  o Patient was given an SSRI/SSNRI medication and warned of possible side effects of the medication including potential for increased risk of suicidal thoughts and feelings. Patient was instructed that if they begin to exhibit any of these effects they will discontinue the medication immediately and contact our office or the ER ASAP.  o Patient agrees to a \"No Self Harm\" contract. Patient will either call , [facility], , [facility], [facility].  o Discussed with patient blood pressure monitoring, hemoglobin A1C levels need to be below 7.0, and LDL (Lipid) goals below 70.  o Advised that cheeses and other sources of dairy fats, animal fats, fast food, and the extras (candy, pastries, pies, doughnuts and cookies) all contain LDL raising nutrients. Advised to increase fruits, vegetables, whole grains, and to monitor portion sizes.   o The patient and I discussed the need for therapy, either with a certified counselor, psychologist, and/or family . If no improvement is noted or worsening of their condition, return to office or ER. " But also discussed with patient that if they are non-responsive to the type of medication they may need to see a psychiatrist for further evaluation and management.   o Patient was given an SSRI/SSNRI medication and warned of possible side effects of the medication including potential for increase risk of suicidal thoughts and feelings. Patient was instructed that if they begin to exhibit any of these effects they will discontinue the medication immediately and contact our office or the ER ASAP.   o Obtained a written consent for AZALEA query. Discussed the risk and benefits of the use of controlled substances with the patient, including the risk of tolerance and drug dependence. The patient has been counseled on the need to have an exit strategy, including potentially discontinuing the use of controlled substances. AZALEA has or will be reviewed as soon as it becomes avaliable.  o Take all medications as prescribed/directed.  o Patient was educated/instructed on their diagnosis, treatment and medications prior to discharge from the clinic today.  o Patient instructed to seek medical attention urgently for new or worsening symptoms.  o Call the office with any concerns or questions.  · Disposition  o Call or Return if symptoms worsen or persist.  o Return Visit Request in/on 6 months +/- 2 weeks (34008).            Electronically Signed by: VANESSA Laboy -Author on February 25, 2021 11:30:20 AM

## 2021-05-14 NOTE — PROGRESS NOTES
Progress Note      Patient Name: Adelita Garcia   Patient ID: [id number]   Sex: Female   YOB: 1955    Primary Care Provider: Nesha MENDEZ   Referring Provider: Nesha MENDEZ    Visit Date: April 21, 2021    Provider: VANESSA Laboy   Location: [facility]   Location Address: [address] [place] [address], [address]  [place], [place]  [id number]   Location Phone: [phone]          Chief Complaint  · worsening yeast inf      History Of Present Illness  Adelita Garcia is a 65 year old /White female who presents for evaluation and treatment of:      Pt has c/o worsening yeast inf of the skin. Pt has been treated with two doses of Diflucan and compounded cream with no relief. Pt states the rash has now spread under both breasts and under both arms. Pt states she was wearing loose fitting jeans and the rash became very irritated.  She reports the cream helped somewhoat in her groin area.  Groin rash was cultured at last visit and it came back positive for yeast.    Advised pt will hold her Jardiace to see of rash improves and will try nystatin powder below breasts and will get pt set up with derm.       Past Medical History  Disease Name Date Onset Notes   Anemia --  --    Anxiety disorder --  --    Arthritis 11/19/2015 --    Bone Density Screening 10/17/2019 normal   Diabetes mellitus, type 2 --  --    Diabetic Eye Exam Last Completed 7/22/2019 scanned in chart   Essential hypertension --  --    Eye exam, routine 1/2019 Yrly Eye Exam   Hyperlipidemia --  --    Hypertriglyceridemia 09/15/2017 --    Insomnia 11/19/15 --    Joint pain --  --    Major depressive disorder 09/11/2018 --    Pap smear for cervical cancer screening 2008 No Longer   Screening Mammogram 1/15/2020 normal   Vitamin D deficiency 06/11/2018 --          Past Surgical History  Procedure Name Date Notes   Cesarian Section 1984 and 1989 --    Colonoscopy 07/09/2012 Dr. Harris- normal repeat in 10 years    Dilation and curretage 2000 and 2007 --    Hysterectomy 2007 --          Medication List  Name Date Started Instructions   Aspir-81 81 mg oral tablet,delayed release (DR/EC)  take 1 tablet (81 mg) by oral route once daily   baclofen 10 mg oral tablet 02/25/2021 TAKE 1 TABLET BY MOUTH ONCE DAILY AT BEDTIME AS NEEDED   bupropion HCl 150 mg oral tablet extended release 24 hr 02/25/2021 TAKE 1 TABLET BY MOUTH ONCE DAILY   diclofenac sodium 75 mg oral tablet,delayed release (DR/EC) 02/25/2021 TAKE 1 TABLET BY MOUTH TWICE DAILY   Diflucan 150 mg oral tablet 04/13/2021 take 1 tablet (150 mg) by oral route once   ferrous sulfate 325 mg (65 mg iron) oral tablet 02/25/2021 take 1 tablet (325 mg) by oral route 2 times per day for 90 days   fluoxetine 40 mg oral capsule 02/25/2021 TAKE 1 CAPSULE BY MOUTH ONCE DAILY IN THE MORNING   hydrochlorothiazide 25 mg oral tablet 02/25/2021 Take 1 tablet by mouth once daily   Jardiance 10 mg oral tablet 02/25/2021 TAKE 1 TABLET BY MOUTH ONCE DAILY IN THE MORNING FOR 90 DAYS   losartan 100 mg oral tablet 02/25/2021 TAKE 1 TABLET BY MOUTH ONCE DAILY   metformin 1,000 mg oral tablet 02/25/2021 TAKE 1 TABLET BY MOUTH TWICE DAILY WITH MORNING AND EVENING MEALS   multivitamin oral  --    pravastatin 20 mg oral tablet 02/25/2021 TAKE 1 TABLET BY MOUTH ONCE DAILY   Vitamin D3 2,000 unit oral tablet  take 1 tablet by oral route daily         Allergy List  Allergen Name Date Reaction Notes   NO KNOWN DRUG ALLERGIES --  --  --        Allergies Reconciled  Family Medical History  Disease Name Relative/Age Notes   Stroke Mother/   Mother   Heart Disease Brother/  Father/  Mother/   Father; Mother; Brother   Hypertension Father/  Mother/   --    - No Family History of Colorectal Cancer  --    Family history of certain chronic disabling diseases; arthritis Brother/   Brother         Reproductive History  Menstrual   Age Menarche: 14   Pregnancy Summary   Total Pregnancies: 2 Full Term: 2 Premature: 0  "  Ab Induced: 0 Ab Spontaneous: 0 Ectopics: 0   Multiples: 0 Livin         Social History  Finding Status Start/Stop Quantity Notes   Active but no formal exercise --  --/-- --  --    Alcohol Never --/-- --  --     --  --/-- --  --    No known infection risk --  --/-- --  --    Recreational Drug Use Never --/-- --  no   Tobacco Never --/-- --  never smoker   Working --  --/-- --  --          Immunizations  NameDate Admin Mfg Trade Name Lot Number Route Inj VIS Given VIS Publication   [id number]/2020 NE Not Entered  NE NE     Comments:    Tdap2017 SKB BOOSTRIX [id number] IM RD 2017   Comments: Injection given. Pt tolerated well.         Review of Systems  · Constitutional  o Denies  o : fever, fatigue, weight loss, weight gain  · Cardiovascular  o Denies  o : lower extremity edema, claudication, chest pressure, palpitations  · Respiratory  o Denies  o : shortness of breath, wheezing, cough, hemoptysis, dyspnea on exertion  · Gastrointestinal  o Denies  o : nausea, vomiting, diarrhea, constipation, abdominal pain  · Integument  o Admits  o : rash      Vitals  Date Time BP Position Site L\R Cuff Size HR RR TEMP (F) WT  HT  BMI kg/m2 BSA m2 O2 Sat FR L/min FiO2        2021 02:17 /67 Sitting    87 - R   216lbs 0oz 5'  3\" 38.26 2.09 97 %  21%          Physical Examination  · Constitutional  o Appearance  o : well-nourished, well developed, alert, in no acute distress  · Respiratory  o Respiratory Effort  o : breathing unlabored  o Auscultation of Lungs  o : normal breath sounds throughout  · Cardiovascular  o Heart  o :   § Auscultation of Heart  § : regular rate and rhythm, no murmurs, gallops or rubs  § Palpation of Heart  § : normal apical impulse, no cardiac thrill present  o Peripheral Vascular System  o :   § Extremities  § : no cyanosis, clubbing or edema; less than 2 second refill noted  · Skin and Subcutaneous Tissue  o General Inspection  o : scattered erythematic " rash below breasts and in salome axilla, excoriated are to salome groin  · Neurologic  o Mental Status Examination  o :   § Orientation  § : grossly oriented to person, place and time  o Cranial Nerves  o : cranial nerves intact and symmetric throughout  · Psychiatric  o Mood and Affect  o : mood normal, affect appropriate, denies any SI/HI          Assessment  · Yeast infection of the skin     112.3/B37.2      Plan  · Orders  o ACO-39: Current medications updated and reviewed (, 1159F) - - 04/21/2021  o DERMATOLOGY CONSULTATION (DERMA) - 112.3/B37.2 - 04/21/2021  o Wound Culture (39747) - 112.3/B37.2 - 04/21/2021   under breasts  · Medications  o nystatin 100,000 unit/gram topical powder   SIG: apply to the affected area(s) by topical route 3 times per day   DISP: (1) Package with 1 refills  Prescribed on 04/21/2021     o Medications have been Reconciled  o Transition of Care or Provider Policy  · Instructions  o Patient was educated/instructed on their diagnosis, treatment and medications prior to discharge from the clinic today.  o Patient instructed to seek medical attention urgently for new or worsening symptoms.  o Call the office with any concerns or questions.  o hold jardiance for one week to see if rash improves  · Disposition  o Call or Return if symptoms worsen or persist.            Electronically Signed by: VANESSA Laboy -Author on April 21, 2021 02:19:11 PM

## 2021-05-15 VITALS
BODY MASS INDEX: 39.78 KG/M2 | HEIGHT: 63 IN | OXYGEN SATURATION: 92 % | SYSTOLIC BLOOD PRESSURE: 145 MMHG | DIASTOLIC BLOOD PRESSURE: 76 MMHG | WEIGHT: 224.5 LBS | HEART RATE: 84 BPM

## 2021-05-15 VITALS
DIASTOLIC BLOOD PRESSURE: 81 MMHG | HEART RATE: 88 BPM | OXYGEN SATURATION: 97 % | HEIGHT: 63 IN | SYSTOLIC BLOOD PRESSURE: 144 MMHG | WEIGHT: 224.12 LBS | BODY MASS INDEX: 39.71 KG/M2

## 2021-05-15 VITALS
WEIGHT: 224.12 LBS | SYSTOLIC BLOOD PRESSURE: 142 MMHG | OXYGEN SATURATION: 96 % | HEIGHT: 63 IN | DIASTOLIC BLOOD PRESSURE: 81 MMHG | BODY MASS INDEX: 39.71 KG/M2 | HEART RATE: 93 BPM

## 2021-05-15 VITALS
SYSTOLIC BLOOD PRESSURE: 124 MMHG | HEIGHT: 63 IN | BODY MASS INDEX: 39.25 KG/M2 | OXYGEN SATURATION: 95 % | WEIGHT: 221.5 LBS | HEART RATE: 86 BPM | DIASTOLIC BLOOD PRESSURE: 84 MMHG

## 2021-05-15 VITALS
SYSTOLIC BLOOD PRESSURE: 136 MMHG | DIASTOLIC BLOOD PRESSURE: 80 MMHG | WEIGHT: 228 LBS | BODY MASS INDEX: 40.4 KG/M2 | OXYGEN SATURATION: 94 % | HEART RATE: 90 BPM | HEIGHT: 63 IN

## 2021-05-15 VITALS
SYSTOLIC BLOOD PRESSURE: 147 MMHG | WEIGHT: 228 LBS | HEART RATE: 93 BPM | OXYGEN SATURATION: 92 % | DIASTOLIC BLOOD PRESSURE: 83 MMHG

## 2021-05-16 VITALS
HEART RATE: 82 BPM | HEIGHT: 63 IN | SYSTOLIC BLOOD PRESSURE: 131 MMHG | BODY MASS INDEX: 39.58 KG/M2 | DIASTOLIC BLOOD PRESSURE: 81 MMHG | WEIGHT: 223.37 LBS

## 2021-05-16 VITALS
SYSTOLIC BLOOD PRESSURE: 130 MMHG | BODY MASS INDEX: 41.82 KG/M2 | DIASTOLIC BLOOD PRESSURE: 72 MMHG | WEIGHT: 236 LBS | HEART RATE: 84 BPM | HEIGHT: 63 IN

## 2021-05-16 VITALS
DIASTOLIC BLOOD PRESSURE: 76 MMHG | HEART RATE: 76 BPM | HEIGHT: 63 IN | OXYGEN SATURATION: 99 % | BODY MASS INDEX: 40.04 KG/M2 | WEIGHT: 226 LBS | SYSTOLIC BLOOD PRESSURE: 131 MMHG

## 2021-05-22 ENCOUNTER — TRANSCRIBE ORDERS (OUTPATIENT)
Dept: ADMINISTRATIVE | Facility: HOSPITAL | Age: 66
End: 2021-05-22

## 2021-05-22 DIAGNOSIS — Z12.31 SCREENING MAMMOGRAM, ENCOUNTER FOR: Primary | ICD-10-CM

## 2021-05-26 ENCOUNTER — HOSPITAL ENCOUNTER (OUTPATIENT)
Dept: FAMILY MEDICINE CLINIC | Facility: CLINIC | Age: 66
Discharge: HOME | End: 2021-05-26
Attending: NURSE PRACTITIONER

## 2021-05-26 ENCOUNTER — OFFICE VISIT CONVERTED (OUTPATIENT)
Dept: FAMILY MEDICINE CLINIC | Facility: CLINIC | Age: 66
End: 2021-05-26
Attending: NURSE PRACTITIONER

## 2021-05-26 ENCOUNTER — CONVERSION ENCOUNTER (OUTPATIENT)
Dept: FAMILY MEDICINE CLINIC | Facility: CLINIC | Age: 66
End: 2021-05-26

## 2021-05-26 LAB
BILIRUB UR QL STRIP: NORMAL
COLOR UR: NORMAL
CONV CLARITY OF URINE: NORMAL
CONV PROTEIN IN URINE BY AUTOMATED TEST STRIP: NORMAL
CONV UROBILINOGEN IN URINE BY AUTOMATED TEST STRIP: NORMAL
GLUCOSE UR QL: NEGATIVE
HGB UR QL STRIP: NORMAL
KETONES UR QL STRIP: NEGATIVE
LEUKOCYTE ESTERASE UR QL STRIP: NORMAL
NITRITE UR QL STRIP: NEGATIVE
PH UR STRIP.AUTO: 5.5 [PH]
SP GR UR: 1.03

## 2021-05-29 LAB
AMPICILLIN SUSC ISLT: <=2
AMPICILLIN+SULBAC SUSC ISLT: <=2
BACTERIA UR CULT: ABNORMAL
CEFAZOLIN SUSC ISLT: <=4
CEFEPIME SUSC ISLT: <=0.12
CEFTAZIDIME SUSC ISLT: <=1
CEFTRIAXONE SUSC ISLT: <=0.25
CIPROFLOXACIN SUSC ISLT: <=0.25
ERTAPENEM SUSC ISLT: <=0.12
GENTAMICIN SUSC ISLT: <=1
LEVOFLOXACIN SUSC ISLT: <=0.12
NITROFURANTOIN SUSC ISLT: <=16
PIP+TAZO SUSC ISLT: <=4
TMP SMX SUSC ISLT: <=20
TOBRAMYCIN SUSC ISLT: <=1

## 2021-06-05 NOTE — PROGRESS NOTES
Progress Note      Patient Name: Adelita Garcia   Patient ID: [id number]   Sex: Female   YOB: 1955    Primary Care Provider: Nesha MENDEZ   Referring Provider: Nesha MENDEZ    Visit Date: May 26, 2021    Provider: VANESSA Laboy   Location: [facility] [place]   Location Address: [address] [place] [address], [address]  [place], [place]  [id number]   Location Phone: [phone]          Chief Complaint  · Possible UTI      History Of Present Illness  Adelita Garcia is a 65 year old /White female who presents for evaluation and treatment of:      Pt has c/o possible UTI. Pt woke up this morning and had intense pressure, dysuria, hematuria, and urinary frequency. Pt has not taken any medication for this.     Pt has mammogram scheduled 7/2021       Past Medical History  Disease Name Date Onset Notes   Anemia --  --    Anxiety disorder --  --    Arthritis 11/19/2015 --    Bone Density Screening 10/17/2019 normal   Diabetes mellitus, type 2 --  --    Diabetic Eye Exam Last Completed 7/22/2019 scanned in chart   Essential hypertension --  --    Eye exam, routine 1/2019 Yrly Eye Exam   Hyperlipidemia --  --    Hypertriglyceridemia 09/15/2017 --    Insomnia 11/19/15 --    Joint pain --  --    Major depressive disorder 09/11/2018 --    Pap smear for cervical cancer screening 2008 No Longer   Screening Mammogram 1/15/2020 normal   Vitamin D deficiency 06/11/2018 --          Past Surgical History  Procedure Name Date Notes   Cesarian Section 1984 and 1989 --    Colonoscopy 07/09/2012 Dr. Harris- normal repeat in 10 years   Dilation and curretage 2000 and 2007 --    Hysterectomy 2007 --          Medication List  Name Date Started Instructions   alclometasone 0.05 % topical cream  apply a thin layer to the affected area(s) by topical route 2 times per day   Aspir-81 81 mg oral tablet,delayed release (DR/EC)  take 1 tablet (81 mg) by oral route once daily   baclofen 10 mg oral tablet  2021 TAKE 1 TABLET BY MOUTH ONCE DAILY AT BEDTIME AS NEEDED   bupropion HCl 150 mg oral tablet extended release 24 hr 2021 TAKE 1 TABLET BY MOUTH ONCE DAILY   diclofenac sodium 75 mg oral tablet,delayed release (DR/EC) 2021 TAKE 1 TABLET BY MOUTH TWICE DAILY   ferrous sulfate 325 mg (65 mg iron) oral tablet 2021 take 1 tablet (325 mg) by oral route 2 times per day for 90 days   fluoxetine 40 mg oral capsule 2021 TAKE 1 CAPSULE BY MOUTH ONCE DAILY IN THE MORNING   hydrochlorothiazide 25 mg oral tablet 2021 Take 1 tablet by mouth once daily   losartan 100 mg oral tablet 2021 TAKE 1 TABLET BY MOUTH ONCE DAILY   metformin 1,000 mg oral tablet 2021 TAKE 1 TABLET BY MOUTH TWICE DAILY WITH MORNING AND EVENING MEALS   multivitamin oral  --    nystatin 100,000 unit/gram topical powder 2021 apply to the affected area(s) by topical route 3 times per day   pravastatin 20 mg oral tablet 2021 TAKE 1 TABLET BY MOUTH ONCE DAILY   Vitamin D3 2,000 unit oral tablet  take 1 tablet by oral route daily         Allergy List  Allergen Name Date Reaction Notes   NO KNOWN DRUG ALLERGIES --  --  --          Family Medical History  Disease Name Relative/Age Notes   Stroke Mother/   Mother   Heart Disease Brother/  Father/  Mother/   Father; Mother; Brother   Hypertension Father/  Mother/   --    - No Family History of Colorectal Cancer  --    Family history of certain chronic disabling diseases; arthritis Brother/   Brother         Reproductive History  Menstrual   Age Menarche: 14   Pregnancy Summary   Total Pregnancies: 2 Full Term: 2 Premature: 0   Ab Induced: 0 Ab Spontaneous: 0 Ectopics: 0   Multiples: 0 Livin         Social History  Finding Status Start/Stop Quantity Notes   Active but no formal exercise --  --/-- --  --    Alcohol Never --/-- --  --     --  --/-- --  --    No known infection risk --  --/-- --  --    Recreational Drug Use Never --/-- --  no   Tobacco  "Never --/-- --  never smoker   Working --  --/-- --  --          Immunizations  NameDate Admin Mfg Trade Name Lot Number Route Inj VIS Given VIS Publication   [id number]/01/2020 NE Not Entered  NE NE     Comments:    Tdap05/09/2017 SK[employer] BOOSTRIX [id number] RD 05/09/2017 01/24/2012   Comments: Injection given. Pt tolerated well.         Review of Systems  · Constitutional  o Denies  o : fever, fatigue, weight loss, weight gain  · Cardiovascular  o Denies  o : lower extremity edema, claudication, chest pressure, palpitations  · Respiratory  o Denies  o : shortness of breath, wheezing, cough, hemoptysis, dyspnea on exertion  · Gastrointestinal  o Denies  o : nausea, vomiting, diarrhea, constipation, abdominal pain  · Genitourinary  o Admits  o : urgency, frequency, dysuria, hematuria      Vitals  Date Time BP Position Site L\R Cuff Size HR RR TEMP (F) WT  HT  BMI kg/m2 BSA m2 O2 Sat FR L/min FiO2 HC       04/21/2021 02:17 /67 Sitting    87 - R   216lbs 0oz 5'  3\" 38.26 2.09 97 %  21%    05/10/2021 10:07 /62 Sitting    93 - R   217lbs 2oz 5'  3\" 38.46 2.09 96 %      05/26/2021 02:58 /60 Sitting    95 - R   217lbs 0oz 5'  3\" 38.44 2.09 97 %  21%          Physical Examination  · Constitutional  o Appearance  o : well-nourished, well developed, alert, in no acute distress  · Respiratory  o Respiratory Effort  o : breathing unlabored  o Auscultation of Lungs  o : normal breath sounds throughout  · Cardiovascular  o Heart  o :   § Auscultation of Heart  § : regular rate and rhythm, no murmurs, gallops or rubs  § Palpation of Heart  § : normal apical impulse, no cardiac thrill present  o Peripheral Vascular System  o :   § Extremities  § : no cyanosis, clubbing or edema; less than 2 second refill noted  · Gastrointestinal  o Abdominal Examination  o : abdomen nontender to palpation, tone normal without rigidity or guarding, no masses present, abdominal contour scaphoid  o Liver and spleen  o : no hepatomegaly " present, liver nontender to palpation, spleen not palpable  · Genitourinary  o FEMALE  EXAM:  o :   §  and rectal exam deferred  § : negative CVA tenderness  · Skin and Subcutaneous Tissue  o General Inspection  o : no rashes or lesions present, no areas of discoloration  · Neurologic  o Mental Status Examination  o :   § Orientation  § : grossly oriented to person, place and time  o Cranial Nerves  o : cranial nerves intact and symmetric throughout  · Psychiatric  o Mood and Affect  o : mood normal, affect appropriate          Results  · In-Office Procedures  o Lab procedure  § IOP - Urinalysis without Microscopy (Clinitek) [facility] ([address])   § Color Ur: Red   § Clarity Ur: Slightly cloudy   § Glucose Ur Ql Strip: Negative   § Bilirub Ur Ql Strip: Small   § Ketones Ur Ql Strip: Negative   § Sp Gr Ur Qn: 1.030   § Hgb Ur Ql Strip: Large   § pH Ur-LsCnc: 5.5   § Prot Ur Ql Strip: >=300 mg/dL   § Urobilinogen Ur Strip-mCnc: 1.0 E.U./dL   § Nitrite Ur Ql Strip: Negative   § WBC Est Ur Ql Strip: Trace       Assessment  · Diabetes mellitus, type 2     250.00/E11.9  · Essential hypertension     401.9/I10  · Urinary tract infection     599.0/N39.0  · Urinary frequency     788.41/R35.0  · Dysuria     788.1/R30.0  · Hematuria     599.70/R31.9      Plan  · Orders  o Urine Culture (Clean Catch) (55267, 50285) - 599.0/N39.0 - 05/26/2021  o ACO-39: Current medications updated and reviewed (, 1159F) - - 05/26/2021  o Diabetic Dilated Eye Exam Consult with Ophthalmology/Optometry (DMEYE) - - 05/26/2021   get records completed in September 2020  · Medications  o Pyridium 100 mg oral tablet   SIG: take 1 tablet (100 mg) by oral route 3 times per day after meals for 2 days as needed   DISP: (6) Tablet with 0 refills  Prescribed on 05/26/2021     o Macrobid 100 mg oral capsule   SIG: take 1 capsule (100 mg) by oral route every 12 hours with food for 7 days   DISP: (14) Capsule with 0 refills  Prescribed on 05/26/2021      o Medications have been Reconciled  o Transition of Care or Provider Policy  · Instructions  o Increase fluid intake. If you develop fever, chills, N/V, flank pain, or worsening of your symptoms return to the office or go to the ER.  o Patient was educated/instructed on their diagnosis, treatment and medications prior to discharge from the clinic today.  o Patient instructed to seek medical attention urgently for new or worsening symptoms.  o Call the office with any concerns or questions.  · Disposition  o Call or Return if symptoms worsen or persist.            Electronically Signed by: VANESSA Laboy -Author on May 26, 2021 03:25:54 PM

## 2021-07-13 ENCOUNTER — HOSPITAL ENCOUNTER (OUTPATIENT)
Dept: MAMMOGRAPHY | Facility: HOSPITAL | Age: 66
Discharge: HOME | End: 2021-07-13
Admitting: NURSE PRACTITIONER

## 2021-07-13 DIAGNOSIS — Z12.31 SCREENING MAMMOGRAM, ENCOUNTER FOR: ICD-10-CM

## 2021-07-13 PROCEDURE — 77067 SCR MAMMO BI INCL CAD: CPT

## 2021-07-13 PROCEDURE — 77067 SCR MAMMO BI INCL CAD: CPT | Performed by: RADIOLOGY

## 2021-07-13 PROCEDURE — 77063 BREAST TOMOSYNTHESIS BI: CPT

## 2021-07-13 PROCEDURE — 77063 BREAST TOMOSYNTHESIS BI: CPT | Performed by: RADIOLOGY

## 2021-07-14 ENCOUNTER — TELEPHONE (OUTPATIENT)
Dept: FAMILY MEDICINE CLINIC | Facility: CLINIC | Age: 66
End: 2021-07-14

## 2021-07-14 NOTE — TELEPHONE ENCOUNTER
----- Message from VANESSA Laboy sent at 7/13/2021  4:35 PM EDT -----  Normal mammo repeat in 1 year

## 2021-07-15 VITALS
BODY MASS INDEX: 38.45 KG/M2 | WEIGHT: 217 LBS | DIASTOLIC BLOOD PRESSURE: 60 MMHG | HEIGHT: 63 IN | SYSTOLIC BLOOD PRESSURE: 132 MMHG | HEART RATE: 95 BPM | OXYGEN SATURATION: 97 %

## 2021-07-15 VITALS
OXYGEN SATURATION: 96 % | DIASTOLIC BLOOD PRESSURE: 62 MMHG | BODY MASS INDEX: 38.47 KG/M2 | HEIGHT: 63 IN | HEART RATE: 93 BPM | WEIGHT: 217.12 LBS | SYSTOLIC BLOOD PRESSURE: 115 MMHG

## 2021-07-26 ENCOUNTER — TELEPHONE (OUTPATIENT)
Dept: FAMILY MEDICINE CLINIC | Facility: CLINIC | Age: 66
End: 2021-07-26

## 2021-08-11 PROBLEM — I10 ESSENTIAL HYPERTENSION: Status: ACTIVE | Noted: 2021-08-11

## 2021-08-11 PROBLEM — E55.9 VITAMIN D DEFICIENCY: Status: ACTIVE | Noted: 2018-06-11

## 2021-08-11 PROBLEM — E11.9 DIABETES MELLITUS, TYPE 2: Status: ACTIVE | Noted: 2021-08-11

## 2021-08-11 PROBLEM — Z96.659 HISTORY OF TOTAL KNEE REPLACEMENT: Status: ACTIVE | Noted: 2017-01-23

## 2021-08-11 PROBLEM — M25.50 JOINT PAIN: Status: ACTIVE | Noted: 2021-08-11

## 2021-08-11 PROBLEM — F32.9 MAJOR DEPRESSIVE DISORDER: Status: ACTIVE | Noted: 2018-09-11

## 2021-08-11 PROBLEM — D64.9 ANEMIA: Status: ACTIVE | Noted: 2021-08-11

## 2021-08-11 PROBLEM — F41.9 ANXIETY DISORDER: Status: ACTIVE | Noted: 2021-08-11

## 2021-08-11 PROBLEM — E78.5 HYPERLIPIDEMIA: Status: ACTIVE | Noted: 2021-08-11

## 2021-08-11 PROBLEM — E78.1 HYPERTRIGLYCERIDEMIA: Status: ACTIVE | Noted: 2017-09-15

## 2021-08-11 RX ORDER — FLUOXETINE HYDROCHLORIDE 40 MG/1
40 CAPSULE ORAL EVERY MORNING
COMMUNITY
Start: 2021-07-08 | End: 2021-10-25 | Stop reason: SDUPTHER

## 2021-08-11 RX ORDER — PRAVASTATIN SODIUM 20 MG
20 TABLET ORAL DAILY
COMMUNITY
Start: 2021-06-03 | End: 2021-08-27

## 2021-08-11 RX ORDER — ASPIRIN 81 MG/1
TABLET ORAL
COMMUNITY
End: 2022-04-25 | Stop reason: SDUPTHER

## 2021-08-11 RX ORDER — LOSARTAN POTASSIUM AND HYDROCHLOROTHIAZIDE 25; 100 MG/1; MG/1
1 TABLET ORAL DAILY
COMMUNITY
End: 2021-10-25 | Stop reason: SDUPTHER

## 2021-08-11 RX ORDER — ORPHENADRINE CITRATE 100 MG/1
TABLET, EXTENDED RELEASE ORAL
COMMUNITY
End: 2021-10-25

## 2021-08-11 RX ORDER — HYDROCHLOROTHIAZIDE 25 MG/1
TABLET ORAL
Qty: 90 TABLET | Refills: 0 | Status: SHIPPED | OUTPATIENT
Start: 2021-08-11 | End: 2021-10-25

## 2021-08-11 RX ORDER — VENLAFAXINE HYDROCHLORIDE 150 MG/1
CAPSULE, EXTENDED RELEASE ORAL
COMMUNITY
End: 2021-10-25

## 2021-08-11 RX ORDER — ALCLOMETASONE DIPROPIONATE 0.5 MG/G
CREAM TOPICAL
COMMUNITY
End: 2021-10-25

## 2021-08-11 RX ORDER — BUPROPION HYDROCHLORIDE 150 MG/1
150 TABLET ORAL DAILY
COMMUNITY
Start: 2021-06-03 | End: 2021-08-27

## 2021-08-11 RX ORDER — FERROUS SULFATE 325(65) MG
1 TABLET ORAL 2 TIMES DAILY
COMMUNITY
Start: 2021-06-03 | End: 2021-10-25 | Stop reason: SDUPTHER

## 2021-08-11 RX ORDER — BACLOFEN 10 MG/1
10 TABLET ORAL
COMMUNITY
End: 2021-10-25 | Stop reason: SDUPTHER

## 2021-08-27 DIAGNOSIS — F41.9 ANXIETY DISORDER, UNSPECIFIED TYPE: Primary | ICD-10-CM

## 2021-08-27 DIAGNOSIS — E78.1 HYPERTRIGLYCERIDEMIA: ICD-10-CM

## 2021-08-27 RX ORDER — BUPROPION HYDROCHLORIDE 150 MG/1
TABLET ORAL
Qty: 90 TABLET | Refills: 0 | Status: SHIPPED | OUTPATIENT
Start: 2021-08-27 | End: 2021-10-25 | Stop reason: SDUPTHER

## 2021-08-27 RX ORDER — PRAVASTATIN SODIUM 20 MG
TABLET ORAL
Qty: 90 TABLET | Refills: 0 | Status: SHIPPED | OUTPATIENT
Start: 2021-08-27 | End: 2021-10-25 | Stop reason: SDUPTHER

## 2021-10-25 ENCOUNTER — OFFICE VISIT (OUTPATIENT)
Dept: FAMILY MEDICINE CLINIC | Facility: CLINIC | Age: 66
End: 2021-10-25

## 2021-10-25 VITALS
OXYGEN SATURATION: 97 % | DIASTOLIC BLOOD PRESSURE: 64 MMHG | BODY MASS INDEX: 39.23 KG/M2 | HEIGHT: 63 IN | SYSTOLIC BLOOD PRESSURE: 135 MMHG | HEART RATE: 93 BPM | WEIGHT: 221.4 LBS

## 2021-10-25 VITALS
WEIGHT: 221.4 LBS | DIASTOLIC BLOOD PRESSURE: 64 MMHG | HEART RATE: 93 BPM | BODY MASS INDEX: 43.47 KG/M2 | HEIGHT: 60 IN | OXYGEN SATURATION: 97 % | SYSTOLIC BLOOD PRESSURE: 135 MMHG

## 2021-10-25 DIAGNOSIS — Z11.59 ENCOUNTER FOR HEPATITIS C SCREENING TEST FOR LOW RISK PATIENT: ICD-10-CM

## 2021-10-25 DIAGNOSIS — F41.9 ANXIETY DISORDER, UNSPECIFIED TYPE: ICD-10-CM

## 2021-10-25 DIAGNOSIS — E11.69 TYPE 2 DIABETES MELLITUS WITH OTHER SPECIFIED COMPLICATION, UNSPECIFIED WHETHER LONG TERM INSULIN USE (HCC): ICD-10-CM

## 2021-10-25 DIAGNOSIS — Z00.00 WELCOME TO MEDICARE PREVENTIVE VISIT: Primary | ICD-10-CM

## 2021-10-25 DIAGNOSIS — E78.1 HYPERTRIGLYCERIDEMIA: ICD-10-CM

## 2021-10-25 DIAGNOSIS — D64.9 ANEMIA, UNSPECIFIED TYPE: ICD-10-CM

## 2021-10-25 DIAGNOSIS — Z13.29 SCREENING FOR THYROID DISORDER: ICD-10-CM

## 2021-10-25 DIAGNOSIS — E55.9 VITAMIN D DEFICIENCY: ICD-10-CM

## 2021-10-25 DIAGNOSIS — M25.50 ARTHRALGIA, UNSPECIFIED JOINT: ICD-10-CM

## 2021-10-25 DIAGNOSIS — F32.9 MAJOR DEPRESSIVE DISORDER, REMISSION STATUS UNSPECIFIED, UNSPECIFIED WHETHER RECURRENT: ICD-10-CM

## 2021-10-25 DIAGNOSIS — I10 ESSENTIAL HYPERTENSION: Primary | ICD-10-CM

## 2021-10-25 PROCEDURE — 1126F AMNT PAIN NOTED NONE PRSNT: CPT | Performed by: NURSE PRACTITIONER

## 2021-10-25 PROCEDURE — 1170F FXNL STATUS ASSESSED: CPT | Performed by: NURSE PRACTITIONER

## 2021-10-25 PROCEDURE — 99214 OFFICE O/P EST MOD 30 MIN: CPT | Performed by: NURSE PRACTITIONER

## 2021-10-25 PROCEDURE — G0402 INITIAL PREVENTIVE EXAM: HCPCS | Performed by: NURSE PRACTITIONER

## 2021-10-25 PROCEDURE — 1159F MED LIST DOCD IN RCRD: CPT | Performed by: NURSE PRACTITIONER

## 2021-10-25 RX ORDER — PRAVASTATIN SODIUM 20 MG
20 TABLET ORAL DAILY
Qty: 90 TABLET | Refills: 1 | Status: SHIPPED | OUTPATIENT
Start: 2021-10-25 | End: 2022-04-25 | Stop reason: SDUPTHER

## 2021-10-25 RX ORDER — LOSARTAN POTASSIUM AND HYDROCHLOROTHIAZIDE 25; 100 MG/1; MG/1
1 TABLET ORAL DAILY
Qty: 90 TABLET | Refills: 1 | Status: SHIPPED | OUTPATIENT
Start: 2021-10-25 | End: 2022-04-25 | Stop reason: SDUPTHER

## 2021-10-25 RX ORDER — BACLOFEN 10 MG/1
10 TABLET ORAL DAILY
Qty: 90 TABLET | Refills: 1 | Status: SHIPPED | OUTPATIENT
Start: 2021-10-25 | End: 2022-11-15 | Stop reason: SDUPTHER

## 2021-10-25 RX ORDER — FERROUS SULFATE 325(65) MG
1 TABLET ORAL 2 TIMES DAILY
Qty: 180 TABLET | Refills: 1 | Status: SHIPPED | OUTPATIENT
Start: 2021-10-25 | End: 2022-04-25 | Stop reason: SDUPTHER

## 2021-10-25 RX ORDER — FLUOXETINE HYDROCHLORIDE 40 MG/1
40 CAPSULE ORAL EVERY MORNING
Qty: 90 CAPSULE | Refills: 1 | Status: SHIPPED | OUTPATIENT
Start: 2021-10-25 | End: 2022-04-25

## 2021-10-25 RX ORDER — BUPROPION HYDROCHLORIDE 150 MG/1
150 TABLET ORAL DAILY
Qty: 90 TABLET | Refills: 1 | Status: SHIPPED | OUTPATIENT
Start: 2021-10-25 | End: 2022-04-25 | Stop reason: SDUPTHER

## 2021-10-25 NOTE — PROGRESS NOTES
The ABCs of the Annual Wellness Visit  Initial Medicare Wellness Visit    Chief Complaint   Patient presents with   • Medicare Wellness-Initial Visit     Subjective   History of Present Illness:  Adelita Garcia is a 66 y.o. female who presents for an Initial Medicare Wellness Visit.    The following portions of the patient's history were reviewed and   updated as appropriate: allergies, current medications, past family history, past medical history, past social history, past surgical history and problem list.     Compared to one year ago, the patient feels her physical   health is the same.    Compared to one year ago, the patient feels her mental   health is the same.    Recent Hospitalizations:  She was not admitted to the hospital during the last year.       Current Medical Providers:  Patient Care Team:  Nesha Gomez APRN as PCP - General (Nurse Practitioner)    Outpatient Medications Prior to Visit   Medication Sig Dispense Refill   • aspirin (aspirin) 81 MG EC tablet Aspir-81 81 mg oral tablet,delayed release (DR/EC) take 1 tablet (81 mg) by oral route once daily   Active     • Cholecalciferol 50 MCG (2000 UT) tablet Vitamin D3 2,000 unit oral tablet take 1 tablet by oral route daily   Active       No facility-administered medications prior to visit.       No opioid medication identified on active medication list. I have reviewed chart for other potential  high risk medication/s and harmful drug interactions in the elderly.          Aspirin is on active medication list. Aspirin use is indicated based on review of current medical condition/s. Pros and cons of this therapy have been discussed today. Benefits of this medication outweigh potential harm.  Patient has been encouraged to continue taking this medication.  .      Patient Active Problem List   Diagnosis   • Anemia   • Anxiety disorder   • Arthritis   • Diabetes mellitus, type 2 (HCC)   • Essential hypertension   • History of total knee replacement  "  • Hyperlipidemia   • Hypertriglyceridemia   • Insomnia   • Joint pain   • Major depressive disorder   • Vitamin D deficiency     Advance Care Planning  Advance Directive is not on file.  ACP discussion was held with the patient during this visit. Patient does not have an advance directive, information provided.          Objective       Vitals:    10/25/21 1214   BP: 135/64   BP Location: Right arm   Pulse: 93   SpO2: 97%   Weight: 100 kg (221 lb 6.4 oz)   Height: 153 cm (60.25\")   PainSc: 0-No pain     BMI Readings from Last 1 Encounters:   10/25/21 42.88 kg/m²   BMI is above normal parameters. Recommendations include: exercise counseling and nutrition counseling    Does the patient have evidence of cognitive impairment? No    Physical Exam          HEALTH RISK ASSESSMENT    Smoking Status:  Social History     Tobacco Use   Smoking Status Never Smoker   Smokeless Tobacco Never Used     Alcohol Consumption:  Social History     Substance and Sexual Activity   Alcohol Use Never     Fall Risk Screen:    STEADI Fall Risk Assessment was completed, and patient is at LOW risk for falls.Assessment completed on:10/25/2021    Depression Screen:   PHQ-2/PHQ-9 Depression Screening 10/25/2021   Little interest or pleasure in doing things 0   Feeling down, depressed, or hopeless 0   Total Score 0       Health Habits and Functional and Cognitive Screening:  Functional & Cognitive Status 10/25/2021   Do you have difficulty preparing food and eating? No   Do you have difficulty bathing yourself, getting dressed or grooming yourself? No   Do you have difficulty using the toilet? No   Do you have difficulty moving around from place to place? No   Do you have trouble with steps or getting out of a bed or a chair? No   Current Diet Well Balanced Diet   Dental Exam Up to date   Eye Exam Not up to date   Exercise (times per week) 0 times per week   Current Exercises Include No Regular Exercise   Do you need help using the phone?  No "   Are you deaf or do you have serious difficulty hearing?  No   Do you need help with transportation? No   Do you need help shopping? No   Do you need help preparing meals?  No   Do you need help with housework?  No   Do you need help with laundry? No   Do you need help taking your medications? No   Do you need help managing money? No   Do you ever drive or ride in a car without wearing a seat belt? No   Have you felt unusual stress, anger or loneliness in the last month? No   Who do you live with? Spouse   If you need help, do you have trouble finding someone available to you? No   Have you been bothered in the last four weeks by sexual problems? No   Do you have difficulty concentrating, remembering or making decisions? No       Age-appropriate Screening Schedule:  Refer to the list below for future screening recommendations based on patient's age, sex and/or medical conditions. Orders for these recommended tests are listed in the plan section. The patient has been provided with a written plan.    Health Maintenance   Topic Date Due   • ZOSTER VACCINE (1 of 2) Never done   • URINE MICROALBUMIN  01/29/2021   • DIABETIC EYE EXAM  05/22/2021   • INFLUENZA VACCINE  08/01/2021   • HEMOGLOBIN A1C  11/07/2021   • DXA SCAN  01/14/2022   • DIABETIC FOOT EXAM  02/25/2022   • LIPID PANEL  05/07/2022   • MAMMOGRAM  07/13/2023   • TDAP/TD VACCINES (2 - Td or Tdap) 05/09/2027            Assessment/Plan   CMS Preventative Services Quick Reference  Risk Factors Identified During Encounter  Obesity/Overweight   The above risks/problems have been discussed with the patient.  Follow up actions/plans if indicated are seen below in the Assessment/Plan Section.  Pertinent information has been shared with the patient in the After Visit Summary.    Diagnoses and all orders for this visit:    1. Welcome to Medicare preventive visit (Primary)        Follow Up:  Return in about 1 year (around 10/25/2022) for Medicare Wellness.     An After  Visit Summary and PPPS were made available to the patient.

## 2021-10-25 NOTE — PROGRESS NOTES
"Chief Complaint  Hypertension (follow up, medication refills), Hyperlipidemia, and Diabetes, depression, anemia and anxiety.    Subjective          Adelita Garcia presents to [facility]  History of Present Illness    Adelita Garcia is a 66 y.o. female who presents today for medication refills.     Pt offers no complaints at this time.     Labs-5/2021 A1c: 5.9  Mammo-7/2021  Dexa-1/2020  Colonoscopy-7/2012    Pt wants to wait on flu vaccine, she plans on going to [place] for this.         Current Outpatient Medications:   •  aspirin (aspirin) 81 MG EC tablet, Aspir-81 81 mg oral tablet,delayed release (DR/EC) take 1 tablet (81 mg) by oral route once daily   Active, Disp: , Rfl:   •  baclofen (LIORESAL) 10 MG tablet, Take 1 tablet by mouth Daily., Disp: 90 tablet, Rfl: 1  •  buPROPion XL (WELLBUTRIN XL) 150 MG 24 hr tablet, Take 1 tablet by mouth Daily., Disp: 90 tablet, Rfl: 1  •  Cholecalciferol 50 MCG (2000 UT) tablet, Vitamin D3 2,000 unit oral tablet take 1 tablet by oral route daily   Active, Disp: , Rfl:   •  DICLOFENAC POTASSIUM PO, Take 75 mg by mouth 2 (two) times a day., Disp: , Rfl:   •  FeroSul 325 (65 Fe) MG tablet, Take 1 tablet by mouth 2 (Two) Times a Day., Disp: 180 tablet, Rfl: 1  •  FLUoxetine (PROzac) 40 MG capsule, Take 1 capsule by mouth Every Morning., Disp: 90 capsule, Rfl: 1  •  losartan-hydrochlorothiazide (HYZAAR) 100-25 MG per tablet, Take 1 tablet by mouth Daily., Disp: 90 tablet, Rfl: 1  •  metFORMIN (GLUCOPHAGE) 1000 MG tablet, Take 1 tablet by mouth 2 (Two) Times a Day With Meals., Disp: 180 tablet, Rfl: 1  •  pravastatin (PRAVACHOL) 20 MG tablet, Take 1 tablet by mouth Daily., Disp: 90 tablet, Rfl: 1    Objective   Vital Signs:   /64 (BP Location: Right arm)   Pulse 93   Ht 158.8 cm (62.5\")   Wt 100 kg (221 lb 6.4 oz)   SpO2 97%   BMI 39.85 kg/m²    Estimated body mass index is 39.85 kg/m² as calculated from the following:    Height as " "of this encounter: 158.8 cm (62.5\").    Weight as of this encounter: 100 kg (221 lb 6.4 oz).   Physical Exam  Constitutional:       General: She is not in acute distress.     Appearance: Normal appearance. She is not ill-appearing.   HENT:      Head: Normocephalic and atraumatic.      Mouth/Throat:      Pharynx: No oropharyngeal exudate or posterior oropharyngeal erythema.   Cardiovascular:      Rate and Rhythm: Normal rate and regular rhythm.      Heart sounds: Normal heart sounds. No murmur heard.      Pulmonary:      Effort: Pulmonary effort is normal. No respiratory distress.      Breath sounds: Normal breath sounds.   Chest:      Chest wall: No tenderness.   Abdominal:      General: There is no distension.      Palpations: There is no mass.      Tenderness: There is no abdominal tenderness. There is no guarding.   Musculoskeletal:         General: No swelling or tenderness. Normal range of motion.      Cervical back: Normal range of motion and neck supple.   Skin:     General: Skin is warm and dry.      Findings: No rash.   Neurological:      General: No focal deficit present.      Mental Status: She is alert and oriented to person, place, and time. Mental status is at baseline.      Gait: Gait normal.   Psychiatric:         Mood and Affect: Mood normal.         Behavior: Behavior normal.         Thought Content: Thought content normal.         Judgment: Judgment normal.        Result Review :                       Assessment and Plan      Diagnoses and all orders for this visit:    1. Essential hypertension (Primary)  Comments:  stable on hyzaar 100/25mg  Orders:  -     losartan-hydrochlorothiazide (HYZAAR) 100-25 MG per tablet; Take 1 tablet by mouth Daily.  Dispense: 90 tablet; Refill: 1  -     Comprehensive metabolic panel; Future  -     Lipid panel; Future    2. Anxiety disorder, unspecified type  Comments:  stable on wellbutrin 150mg  and prozac 40mg  Orders:  -     buPROPion XL (WELLBUTRIN XL) 150 MG 24 " hr tablet; Take 1 tablet by mouth Daily.  Dispense: 90 tablet; Refill: 1    3. Hypertriglyceridemia  -     pravastatin (PRAVACHOL) 20 MG tablet; Take 1 tablet by mouth Daily.  Dispense: 90 tablet; Refill: 1  -     Comprehensive metabolic panel; Future  -     Lipid panel; Future    4. Major depressive disorder, remission status unspecified, unspecified whether recurrent  Comments:  stable on prozac 40mg and wellbutrin 150mg  Orders:  -     FLUoxetine (PROzac) 40 MG capsule; Take 1 capsule by mouth Every Morning.  Dispense: 90 capsule; Refill: 1    5. Anemia, unspecified type  Comments:  stable on iron 325mg bid  Orders:  -     FeroSul 325 (65 Fe) MG tablet; Take 1 tablet by mouth 2 (Two) Times a Day.  Dispense: 180 tablet; Refill: 1  -     CBC w AUTO Differential; Future  -     Iron Profile; Future    6. Arthralgia, unspecified joint  Comments:  stable on diclofenac 75mg  Orders:  -     baclofen (LIORESAL) 10 MG tablet; Take 1 tablet by mouth Daily.  Dispense: 90 tablet; Refill: 1  -     CBC w AUTO Differential; Future    7. Type 2 diabetes mellitus with other specified complication, unspecified whether long term insulin use (HCC)  Comments:  stable on metformin 1000mg bied  Orders:  -     metFORMIN (GLUCOPHAGE) 1000 MG tablet; Take 1 tablet by mouth 2 (Two) Times a Day With Meals.  Dispense: 180 tablet; Refill: 1  -     Hemoglobin A1c; Future  -     MicroAlbumin, Urine, Random - Urine, Clean Catch; Future    8. Screening for thyroid disorder  -     TSH; Future    9. Encounter for hepatitis C screening test for low risk patient  -     Hepatitis panel, acute; Future    10. Vitamin D deficiency  -     Vitamin D 25 hydroxy; Future         Follow Up       Return in about 6 months (around 4/25/2022).      I have reviewed information obtained and documented by others and I have confirmed the accuracy of this documented note.     Patient was given instructions and counseling regarding her condition or for health maintenance  advice. Please see specific information pulled into the AVS if appropriate.     VANESSA Laboy

## 2021-11-24 ENCOUNTER — LAB (OUTPATIENT)
Dept: LAB | Facility: HOSPITAL | Age: 66
End: 2021-11-24

## 2021-11-24 DIAGNOSIS — Z13.29 SCREENING FOR THYROID DISORDER: ICD-10-CM

## 2021-11-24 DIAGNOSIS — M25.50 ARTHRALGIA, UNSPECIFIED JOINT: ICD-10-CM

## 2021-11-24 DIAGNOSIS — E55.9 VITAMIN D DEFICIENCY: ICD-10-CM

## 2021-11-24 DIAGNOSIS — E11.69 TYPE 2 DIABETES MELLITUS WITH OTHER SPECIFIED COMPLICATION, UNSPECIFIED WHETHER LONG TERM INSULIN USE (HCC): ICD-10-CM

## 2021-11-24 DIAGNOSIS — I10 ESSENTIAL HYPERTENSION: ICD-10-CM

## 2021-11-24 DIAGNOSIS — E78.1 HYPERTRIGLYCERIDEMIA: ICD-10-CM

## 2021-11-24 DIAGNOSIS — Z11.59 ENCOUNTER FOR HEPATITIS C SCREENING TEST FOR LOW RISK PATIENT: ICD-10-CM

## 2021-11-24 DIAGNOSIS — D64.9 ANEMIA, UNSPECIFIED TYPE: ICD-10-CM

## 2021-11-24 LAB
25(OH)D3 SERPL-MCNC: 39.9 NG/ML
ALBUMIN SERPL-MCNC: 4.2 G/DL (ref 3.5–5.2)
ALBUMIN UR-MCNC: 1.3 MG/DL
ALBUMIN/GLOB SERPL: 1.4 G/DL
ALP SERPL-CCNC: 111 U/L (ref 39–117)
ALT SERPL W P-5'-P-CCNC: 17 U/L (ref 1–33)
ANION GAP SERPL CALCULATED.3IONS-SCNC: 13.6 MMOL/L (ref 5–15)
AST SERPL-CCNC: 13 U/L (ref 1–32)
BASOPHILS # BLD AUTO: 0.03 10*3/MM3 (ref 0–0.2)
BASOPHILS NFR BLD AUTO: 0.4 % (ref 0–1.5)
BILIRUB SERPL-MCNC: 0.2 MG/DL (ref 0–1.2)
BUN SERPL-MCNC: 11 MG/DL (ref 8–23)
BUN/CREAT SERPL: 20.4 (ref 7–25)
CALCIUM SPEC-SCNC: 9.4 MG/DL (ref 8.6–10.5)
CHLORIDE SERPL-SCNC: 98 MMOL/L (ref 98–107)
CHOLEST SERPL-MCNC: 174 MG/DL (ref 0–200)
CO2 SERPL-SCNC: 27.4 MMOL/L (ref 22–29)
CREAT SERPL-MCNC: 0.54 MG/DL (ref 0.57–1)
DEPRECATED RDW RBC AUTO: 44.2 FL (ref 37–54)
EOSINOPHIL # BLD AUTO: 0.28 10*3/MM3 (ref 0–0.4)
EOSINOPHIL NFR BLD AUTO: 3.8 % (ref 0.3–6.2)
ERYTHROCYTE [DISTWIDTH] IN BLOOD BY AUTOMATED COUNT: 14.3 % (ref 12.3–15.4)
GFR SERPL CREATININE-BSD FRML MDRD: 113 ML/MIN/1.73
GLOBULIN UR ELPH-MCNC: 2.9 GM/DL
GLUCOSE SERPL-MCNC: 116 MG/DL (ref 65–99)
HAV IGM SERPL QL IA: NORMAL
HBA1C MFR BLD: 6.1 % (ref 4.8–5.6)
HBV CORE IGM SERPL QL IA: NORMAL
HBV SURFACE AG SERPL QL IA: NORMAL
HCT VFR BLD AUTO: 39.3 % (ref 34–46.6)
HCV AB SER DONR QL: NORMAL
HDLC SERPL-MCNC: 39 MG/DL (ref 40–60)
HGB BLD-MCNC: 12.9 G/DL (ref 12–15.9)
IMM GRANULOCYTES # BLD AUTO: 0.03 10*3/MM3 (ref 0–0.05)
IMM GRANULOCYTES NFR BLD AUTO: 0.4 % (ref 0–0.5)
IRON 24H UR-MRATE: 47 MCG/DL (ref 37–145)
IRON SATN MFR SERPL: 11 % (ref 20–50)
LDLC SERPL CALC-MCNC: 104 MG/DL (ref 0–100)
LDLC/HDLC SERPL: 2.55 {RATIO}
LYMPHOCYTES # BLD AUTO: 1.71 10*3/MM3 (ref 0.7–3.1)
LYMPHOCYTES NFR BLD AUTO: 23.5 % (ref 19.6–45.3)
MCH RBC QN AUTO: 28.2 PG (ref 26.6–33)
MCHC RBC AUTO-ENTMCNC: 32.8 G/DL (ref 31.5–35.7)
MCV RBC AUTO: 85.8 FL (ref 79–97)
MONOCYTES # BLD AUTO: 0.37 10*3/MM3 (ref 0.1–0.9)
MONOCYTES NFR BLD AUTO: 5.1 % (ref 5–12)
NEUTROPHILS NFR BLD AUTO: 4.87 10*3/MM3 (ref 1.7–7)
NEUTROPHILS NFR BLD AUTO: 66.8 % (ref 42.7–76)
NRBC BLD AUTO-RTO: 0 /100 WBC (ref 0–0.2)
PLATELET # BLD AUTO: 308 10*3/MM3 (ref 140–450)
PMV BLD AUTO: 10.3 FL (ref 6–12)
POTASSIUM SERPL-SCNC: 3.7 MMOL/L (ref 3.5–5.2)
PROT SERPL-MCNC: 7.1 G/DL (ref 6–8.5)
RBC # BLD AUTO: 4.58 10*6/MM3 (ref 3.77–5.28)
SODIUM SERPL-SCNC: 139 MMOL/L (ref 136–145)
TIBC SERPL-MCNC: 410 MCG/DL (ref 298–536)
TRANSFERRIN SERPL-MCNC: 275 MG/DL (ref 200–360)
TRIGL SERPL-MCNC: 178 MG/DL (ref 0–150)
TSH SERPL DL<=0.05 MIU/L-ACNC: 1.69 UIU/ML (ref 0.27–4.2)
VLDLC SERPL-MCNC: 31 MG/DL (ref 5–40)
WBC NRBC COR # BLD: 7.29 10*3/MM3 (ref 3.4–10.8)

## 2021-11-24 PROCEDURE — 84466 ASSAY OF TRANSFERRIN: CPT

## 2021-11-24 PROCEDURE — 82306 VITAMIN D 25 HYDROXY: CPT

## 2021-11-24 PROCEDURE — 82043 UR ALBUMIN QUANTITATIVE: CPT

## 2021-11-24 PROCEDURE — 85025 COMPLETE CBC W/AUTO DIFF WBC: CPT

## 2021-11-24 PROCEDURE — 80061 LIPID PANEL: CPT

## 2021-11-24 PROCEDURE — 83540 ASSAY OF IRON: CPT

## 2021-11-24 PROCEDURE — 36415 COLL VENOUS BLD VENIPUNCTURE: CPT

## 2021-11-24 PROCEDURE — 80053 COMPREHEN METABOLIC PANEL: CPT

## 2021-11-24 PROCEDURE — 83036 HEMOGLOBIN GLYCOSYLATED A1C: CPT

## 2021-11-24 PROCEDURE — 80074 ACUTE HEPATITIS PANEL: CPT

## 2021-11-24 PROCEDURE — 84443 ASSAY THYROID STIM HORMONE: CPT

## 2021-11-29 ENCOUNTER — TELEPHONE (OUTPATIENT)
Dept: FAMILY MEDICINE CLINIC | Facility: CLINIC | Age: 66
End: 2021-11-29

## 2021-11-29 NOTE — TELEPHONE ENCOUNTER
----- Message from VANESSA Laboy sent at 11/28/2021  9:33 AM EST -----  Labs stable a1c upper limit of normal continue to watch diet an exercise

## 2022-04-23 DIAGNOSIS — F32.9 MAJOR DEPRESSIVE DISORDER, REMISSION STATUS UNSPECIFIED, UNSPECIFIED WHETHER RECURRENT: ICD-10-CM

## 2022-04-25 ENCOUNTER — OFFICE VISIT (OUTPATIENT)
Dept: FAMILY MEDICINE CLINIC | Facility: CLINIC | Age: 67
End: 2022-04-25

## 2022-04-25 VITALS
HEIGHT: 60 IN | BODY MASS INDEX: 42.6 KG/M2 | WEIGHT: 217 LBS | OXYGEN SATURATION: 94 % | DIASTOLIC BLOOD PRESSURE: 65 MMHG | HEART RATE: 87 BPM | SYSTOLIC BLOOD PRESSURE: 144 MMHG

## 2022-04-25 VITALS
DIASTOLIC BLOOD PRESSURE: 65 MMHG | HEIGHT: 60 IN | OXYGEN SATURATION: 94 % | SYSTOLIC BLOOD PRESSURE: 144 MMHG | WEIGHT: 217 LBS | BODY MASS INDEX: 42.6 KG/M2 | HEART RATE: 87 BPM

## 2022-04-25 DIAGNOSIS — E78.2 MIXED HYPERLIPIDEMIA: Primary | ICD-10-CM

## 2022-04-25 DIAGNOSIS — E78.1 HYPERTRIGLYCERIDEMIA: ICD-10-CM

## 2022-04-25 DIAGNOSIS — D64.9 ANEMIA, UNSPECIFIED TYPE: ICD-10-CM

## 2022-04-25 DIAGNOSIS — J34.89 SINUS PRESSURE: ICD-10-CM

## 2022-04-25 DIAGNOSIS — I10 ESSENTIAL HYPERTENSION: ICD-10-CM

## 2022-04-25 DIAGNOSIS — Z23 NEED FOR PNEUMOCOCCAL VACCINATION: ICD-10-CM

## 2022-04-25 DIAGNOSIS — J01.11 ACUTE RECURRENT FRONTAL SINUSITIS: ICD-10-CM

## 2022-04-25 DIAGNOSIS — F41.9 ANXIETY DISORDER, UNSPECIFIED TYPE: ICD-10-CM

## 2022-04-25 DIAGNOSIS — E11.69 TYPE 2 DIABETES MELLITUS WITH OTHER SPECIFIED COMPLICATION, UNSPECIFIED WHETHER LONG TERM INSULIN USE: ICD-10-CM

## 2022-04-25 DIAGNOSIS — J34.89 SINUS DRAINAGE: ICD-10-CM

## 2022-04-25 DIAGNOSIS — Z00.00 WELCOME TO MEDICARE PREVENTIVE VISIT: Primary | ICD-10-CM

## 2022-04-25 DIAGNOSIS — F32.9 MAJOR DEPRESSIVE DISORDER, REMISSION STATUS UNSPECIFIED, UNSPECIFIED WHETHER RECURRENT: ICD-10-CM

## 2022-04-25 DIAGNOSIS — J01.01 ACUTE RECURRENT MAXILLARY SINUSITIS: ICD-10-CM

## 2022-04-25 DIAGNOSIS — Z78.0 POST-MENOPAUSE: ICD-10-CM

## 2022-04-25 PROBLEM — E11.9 TYPE 2 DIABETES MELLITUS WITHOUT COMPLICATION: Status: ACTIVE | Noted: 2022-04-25

## 2022-04-25 PROBLEM — M19.90 OSTEOARTHRITIS: Status: ACTIVE | Noted: 2022-04-25

## 2022-04-25 PROCEDURE — G0402 INITIAL PREVENTIVE EXAM: HCPCS | Performed by: NURSE PRACTITIONER

## 2022-04-25 PROCEDURE — 1160F RVW MEDS BY RX/DR IN RCRD: CPT | Performed by: NURSE PRACTITIONER

## 2022-04-25 PROCEDURE — 1170F FXNL STATUS ASSESSED: CPT | Performed by: NURSE PRACTITIONER

## 2022-04-25 PROCEDURE — 99214 OFFICE O/P EST MOD 30 MIN: CPT | Performed by: NURSE PRACTITIONER

## 2022-04-25 RX ORDER — BUPROPION HYDROCHLORIDE 150 MG/1
150 TABLET ORAL DAILY
Qty: 90 TABLET | Refills: 1 | Status: SHIPPED | OUTPATIENT
Start: 2022-04-25 | End: 2022-10-05

## 2022-04-25 RX ORDER — AZITHROMYCIN 250 MG/1
TABLET, FILM COATED ORAL
Qty: 6 TABLET | Refills: 0 | Status: SHIPPED | OUTPATIENT
Start: 2022-04-25 | End: 2022-10-27

## 2022-04-25 RX ORDER — FLUOXETINE HYDROCHLORIDE 40 MG/1
CAPSULE ORAL
Qty: 90 CAPSULE | Refills: 1 | Status: SHIPPED | OUTPATIENT
Start: 2022-04-25 | End: 2022-04-25 | Stop reason: SDUPTHER

## 2022-04-25 RX ORDER — PRAVASTATIN SODIUM 20 MG
20 TABLET ORAL DAILY
Qty: 90 TABLET | Refills: 1 | Status: SHIPPED | OUTPATIENT
Start: 2022-04-25 | End: 2022-10-05

## 2022-04-25 RX ORDER — FERROUS SULFATE 325(65) MG
1 TABLET ORAL 2 TIMES DAILY
Qty: 180 TABLET | Refills: 1 | Status: SHIPPED | OUTPATIENT
Start: 2022-04-25

## 2022-04-25 RX ORDER — ASPIRIN 81 MG/1
81 TABLET ORAL DAILY
Qty: 90 TABLET | Refills: 1 | Status: SHIPPED | OUTPATIENT
Start: 2022-04-25

## 2022-04-25 RX ORDER — LOSARTAN POTASSIUM AND HYDROCHLOROTHIAZIDE 25; 100 MG/1; MG/1
1 TABLET ORAL DAILY
Qty: 90 TABLET | Refills: 1 | Status: SHIPPED | OUTPATIENT
Start: 2022-04-25 | End: 2022-10-05

## 2022-04-25 RX ORDER — FLUOXETINE HYDROCHLORIDE 40 MG/1
40 CAPSULE ORAL EVERY MORNING
Qty: 90 CAPSULE | Refills: 1 | Status: SHIPPED | OUTPATIENT
Start: 2022-04-25 | End: 2022-10-05

## 2022-04-25 NOTE — PROGRESS NOTES
Chief Complaint  Diabetes, Hypertension, Hyperlipidemia, and Sinus Problem    Subjective            Adelita Garcia presents to [facility]  Pt is here for a 6 mo f/u. Pt has c/o possible sinus infection. Pt has been having sinus drainage and sinus pressure. PT has tried taking benadryl, sudafed, claritin, and flonase with no relief.    Pt is due labs.    Pt is due dexa.    Pt would like to get pneumo 13 today.     Pt will have MAW today as well.         Past Medical History:   Diagnosis Date   • Abnormal bone density screening 10/17/2019    NORMAL   • Anemia    • Anxiety disorder    • Arthritis 2015   • Diabetes mellitus (HCC)     TYPE 2   • Diabetic eye exam (HCC) 2019    SCANNED IN CHART   • Essential hypertension    • Eye exam, routine 2019    YEARLY EYE EXAM   • Hyperlipidemia    • Hypertriglyceridemia 09/15/2017   • Insomnia 2015   • Joint pain    • Major depressive disorder 2018   • Pap smear for cervical cancer screening     NO LONGER   • Visit for screening mammogram 01/15/2020    NORMAL   • Vitamin D deficiency 2018       No Known Allergies     Past Surgical History:   Procedure Laterality Date   •  SECTION  ,   • COLONOSCOPY  2012    -NORMAL REPEAT IN 10 YEARS    • DILATION AND CURETTAGE, DIAGNOSTIC / THERAPEUTIC  ,   • EYE SURGERY     • HYSTERECTOMY     • JOINT REPLACEMENT     • TUBAL ABDOMINAL LIGATION          Social History     Tobacco Use   • Smoking status: Never Smoker   • Smokeless tobacco: Never Used   Substance Use Topics   • Alcohol use: Never       Family History   Problem Relation Age of Onset   • Stroke Mother    • Heart disease Mother    • Hypertension Mother    • Heart disease Father    • Hypertension Father    • Heart disease Brother    • Arthritis Brother    • Heart disease Brother         Current Outpatient Medications on File Prior to Visit   Medication Sig   •  "baclofen (LIORESAL) 10 MG tablet Take 1 tablet by mouth Daily.   • Cholecalciferol 50 MCG (2000 UT) tablet Vitamin D3 2,000 unit oral tablet take 1 tablet by oral route daily   Active   • [DISCONTINUED] aspirin 81 MG EC tablet Aspir-81 81 mg oral tablet,delayed release (DR/EC) take 1 tablet (81 mg) by oral route once daily   Active   • [DISCONTINUED] buPROPion XL (WELLBUTRIN XL) 150 MG 24 hr tablet Take 1 tablet by mouth Daily.   • [DISCONTINUED] FeroSul 325 (65 Fe) MG tablet Take 1 tablet by mouth 2 (Two) Times a Day.   • [DISCONTINUED] FLUoxetine (PROzac) 40 MG capsule Take 1 capsule by mouth once daily in the morning   • [DISCONTINUED] losartan-hydrochlorothiazide (HYZAAR) 100-25 MG per tablet Take 1 tablet by mouth Daily.   • [DISCONTINUED] metFORMIN (GLUCOPHAGE) 1000 MG tablet Take 1 tablet by mouth 2 (Two) Times a Day With Meals.   • [DISCONTINUED] pravastatin (PRAVACHOL) 20 MG tablet Take 1 tablet by mouth Daily.   • DICLOFENAC POTASSIUM PO Take 75 mg by mouth 2 (two) times a day.   • [DISCONTINUED] FLUoxetine (PROzac) 40 MG capsule Take 1 capsule by mouth Every Morning.     No current facility-administered medications on file prior to visit.       Health Maintenance Due   Topic Date Due   • Pneumococcal Vaccine 65+ (1 - PCV) Never done   • ZOSTER VACCINE (1 of 2) Never done   • DIABETIC EYE EXAM  05/22/2021   • DXA SCAN  01/14/2022   • DIABETIC FOOT EXAM  02/25/2022       Objective     /65   Pulse 87   Ht 153 cm (60.25\")   Wt 98.4 kg (217 lb)   SpO2 94%   BMI 42.03 kg/m²       Physical Exam  Constitutional:       General: She is not in acute distress.     Appearance: Normal appearance. She is not ill-appearing.   HENT:      Head: Normocephalic and atraumatic.      Comments: Frontal sinus tenderness upon palpation     Right Ear: Tympanic membrane, ear canal and external ear normal.      Left Ear: Tympanic membrane, ear canal and external ear normal.      Mouth/Throat:      Pharynx: No oropharyngeal " exudate or posterior oropharyngeal erythema.   Cardiovascular:      Rate and Rhythm: Normal rate and regular rhythm.      Pulses: Normal pulses.      Heart sounds: Normal heart sounds. No murmur heard.  Pulmonary:      Effort: Pulmonary effort is normal. No respiratory distress.      Breath sounds: Normal breath sounds.   Chest:      Chest wall: No tenderness.   Abdominal:      General: Abdomen is flat. Bowel sounds are normal. There is no distension.      Palpations: Abdomen is soft. There is no mass.      Tenderness: There is no abdominal tenderness. There is no guarding.   Musculoskeletal:         General: No swelling or tenderness. Normal range of motion.      Cervical back: Normal range of motion and neck supple.   Skin:     General: Skin is warm and dry.      Findings: No rash.   Neurological:      General: No focal deficit present.      Mental Status: She is alert and oriented to person, place, and time. Mental status is at baseline.      Gait: Gait normal.   Psychiatric:         Mood and Affect: Mood normal.         Behavior: Behavior normal.         Thought Content: Thought content normal.         Judgment: Judgment normal.           Result Review :                           Assessment and Plan        Diagnoses and all orders for this visit:    1. Mixed hyperlipidemia (Primary)  Comments:  stable on pravochol 20mg, continue  Orders:  -     Comprehensive metabolic panel; Future  -     Lipid panel; Future    2. Post-menopause  -     DEXA Bone Density Axial    3. Sinus pressure  -     azithromycin (Zithromax Z-Lebron) 250 MG tablet; Take 2 tablets by mouth on day 1, then 1 tablet daily on days 2-5  Dispense: 6 tablet; Refill: 0    4. Sinus drainage  -     azithromycin (Zithromax Z-Lebron) 250 MG tablet; Take 2 tablets by mouth on day 1, then 1 tablet daily on days 2-5  Dispense: 6 tablet; Refill: 0    5. Anxiety disorder, unspecified type  Comments:  stable on wellbutrin 150mg  and prozac 40mg, continue  Orders:  -      buPROPion XL (WELLBUTRIN XL) 150 MG 24 hr tablet; Take 1 tablet by mouth Daily.  Dispense: 90 tablet; Refill: 1    6. Anemia, unspecified type  Comments:  stable on iron 325mg bid, conitnue  Orders:  -     Iron Profile; Future  -     FeroSul 325 (65 Fe) MG tablet; Take 1 tablet by mouth 2 (Two) Times a Day.  Dispense: 180 tablet; Refill: 1    7. Major depressive disorder, remission status unspecified, unspecified whether recurrent  Comments:  stable on prozac 40mg and wellbutrin 150mg continue  Orders:  -     FLUoxetine (PROzac) 40 MG capsule; Take 1 capsule by mouth Every Morning.  Dispense: 90 capsule; Refill: 1    8. Essential hypertension  Comments:  stable on hyzaar 100/25mg  Orders:  -     CBC w AUTO Differential; Future  -     Comprehensive metabolic panel; Future  -     Lipid panel; Future  -     losartan-hydrochlorothiazide (HYZAAR) 100-25 MG per tablet; Take 1 tablet by mouth Daily.  Dispense: 90 tablet; Refill: 1    9. Type 2 diabetes mellitus with other specified complication, unspecified whether long term insulin use (HCC)  Comments:  stable on metformin 1000mg bied  Orders:  -     CBC w AUTO Differential; Future  -     Comprehensive metabolic panel; Future  -     Lipid panel; Future  -     TSH; Future  -     T4, free; Future  -     Hemoglobin A1c; Future  -     MicroAlbumin, Urine, Random - Urine, Clean Catch; Future  -     metFORMIN (GLUCOPHAGE) 1000 MG tablet; Take 1 tablet by mouth 2 (Two) Times a Day With Meals.  Dispense: 180 tablet; Refill: 1    10. Hypertriglyceridemia  -     pravastatin (PRAVACHOL) 20 MG tablet; Take 1 tablet by mouth Daily.  Dispense: 90 tablet; Refill: 1    11. Need for pneumococcal vaccination  -     pneumococcal conj. 13-valent (PREVNAR-13) vaccine 0.5 mL    12. Acute recurrent frontal sinusitis    13. Acute recurrent maxillary sinusitis  -     azithromycin (Zithromax Z-Lebron) 250 MG tablet; Take 2 tablets by mouth on day 1, then 1 tablet daily on days 2-5  Dispense: 6  tablet; Refill: 0    Other orders  -     aspirin 81 MG EC tablet; Take 1 tablet by mouth Daily.  Dispense: 90 tablet; Refill: 1              Follow Up     Return in about 6 months (around 10/25/2022).    Patient was given instructions and counseling regarding her condition or for health maintenance advice. Please see specific information pulled into the AVS if appropriate.       VANESSA Laboy

## 2022-04-25 NOTE — PROGRESS NOTES
The ABCs of the Annual Wellness Visit  Initial Medicare Wellness Visit    Chief Complaint   Patient presents with   • Welcome To Medicare     Subjective   History of Present Illness:  Adelita Garcia is a 66 y.o. female who presents for an Initial Medicare Wellness Visit.    The following portions of the patient's history were reviewed and   updated as appropriate: allergies, current medications, past family history, past medical history, past social history, past surgical history and problem list.     Compared to one year ago, the patient feels her physical   health is the same.    Compared to one year ago, the patient feels her mental   health is the same.    Recent Hospitalizations:  She was not admitted to the hospital during the last year.       Current Medical Providers:  Patient Care Team:  Nesha Gomez APRN as PCP - General (Nurse Practitioner)    Outpatient Medications Prior to Visit   Medication Sig Dispense Refill   • baclofen (LIORESAL) 10 MG tablet Take 1 tablet by mouth Daily. 90 tablet 1   • Cholecalciferol 50 MCG (2000 UT) tablet Vitamin D3 2,000 unit oral tablet take 1 tablet by oral route daily   Active     • aspirin 81 MG EC tablet Take 1 tablet by mouth Daily. 90 tablet 1   • azithromycin (Zithromax Z-Lebron) 250 MG tablet Take 2 tablets by mouth on day 1, then 1 tablet daily on days 2-5 6 tablet 0   • buPROPion XL (WELLBUTRIN XL) 150 MG 24 hr tablet Take 1 tablet by mouth Daily. 90 tablet 1   • DICLOFENAC POTASSIUM PO Take 75 mg by mouth 2 (two) times a day.     • FeroSul 325 (65 Fe) MG tablet Take 1 tablet by mouth 2 (Two) Times a Day. 180 tablet 1   • FLUoxetine (PROzac) 40 MG capsule Take 1 capsule by mouth Every Morning. 90 capsule 1   • losartan-hydrochlorothiazide (HYZAAR) 100-25 MG per tablet Take 1 tablet by mouth Daily. 90 tablet 1   • metFORMIN (GLUCOPHAGE) 1000 MG tablet Take 1 tablet by mouth 2 (Two) Times a Day With Meals. 180 tablet 1   • pravastatin (PRAVACHOL) 20 MG tablet Take 1  "tablet by mouth Daily. 90 tablet 1     No facility-administered medications prior to visit.       No opioid medication identified on active medication list. I have reviewed chart for other potential  high risk medication/s and harmful drug interactions in the elderly.          Aspirin is on active medication list. Aspirin use is indicated based on review of current medical condition/s. Pros and cons of this therapy have been discussed today. Benefits of this medication outweigh potential harm.  Patient has been encouraged to continue taking this medication.  .      Patient Active Problem List   Diagnosis   • Anemia   • Anxiety disorder   • Arthritis   • Essential hypertension   • History of total knee replacement   • Hyperlipidemia   • Hypertriglyceridemia   • Insomnia   • Joint pain   • Major depressive disorder   • Vitamin D deficiency   • Osteoarthritis   • Type 2 diabetes mellitus without complication (HCC)     Advance Care Planning  Advance Directive is not on file.  ACP discussion was held with the patient during this visit. Patient does not have an advance directive, information provided.          Objective       Vitals:    04/25/22 1146   BP: 144/65   Pulse: 87   SpO2: 94%   Weight: 98.4 kg (217 lb)   Height: 153 cm (60.25\")     BMI Readings from Last 1 Encounters:   04/25/22 42.03 kg/m²   BMI is above normal parameters. Recommendations include: nutrition counseling    Does the patient have evidence of cognitive impairment? No              HEALTH RISK ASSESSMENT    Smoking Status:  Social History     Tobacco Use   Smoking Status Never Smoker   Smokeless Tobacco Never Used     Alcohol Consumption:  Social History     Substance and Sexual Activity   Alcohol Use Never     Fall Risk Screen:    SLIMADI Fall Risk Assessment was completed, and patient is at LOW risk for falls.Assessment completed on:4/25/2022    Depression Screen:   PHQ-2/PHQ-9 Depression Screening 4/25/2022   Retired PHQ-9 Total Score -   Retired " Total Score -   Little Interest or Pleasure in Doing Things 0-->not at all   Feeling Down, Depressed or Hopeless 0-->not at all   PHQ-9: Brief Depression Severity Measure Score 0       Health Habits and Functional and Cognitive Screening:  Functional & Cognitive Status 4/25/2022   Do you have difficulty preparing food and eating? No   Do you have difficulty bathing yourself, getting dressed or grooming yourself? No   Do you have difficulty using the toilet? No   Do you have difficulty moving around from place to place? No   Do you have trouble with steps or getting out of a bed or a chair? No   Current Diet Well Balanced Diet   Dental Exam Up to date   Eye Exam Up to date   Exercise (times per week) 0 times per week   Current Exercises Include No Regular Exercise   Do you need help using the phone?  No   Are you deaf or do you have serious difficulty hearing?  No   Do you need help with transportation? No   Do you need help shopping? No   Do you need help preparing meals?  No   Do you need help with housework?  No   Do you need help with laundry? No   Do you need help taking your medications? No   Do you need help managing money? No   Do you ever drive or ride in a car without wearing a seat belt? No   Have you felt unusual stress, anger or loneliness in the last month? No   Who do you live with? Spouse   If you need help, do you have trouble finding someone available to you? No   Have you been bothered in the last four weeks by sexual problems? No   Do you have difficulty concentrating, remembering or making decisions? No       Age-appropriate Screening Schedule:  Refer to the list below for future screening recommendations based on patient's age, sex and/or medical conditions. Orders for these recommended tests are listed in the plan section. The patient has been provided with a written plan.    Health Maintenance   Topic Date Due   • ZOSTER VACCINE (1 of 2) Never done   • DIABETIC EYE EXAM  05/22/2021   • DXA  SCAN  01/14/2022   • DIABETIC FOOT EXAM  02/25/2022   • HEMOGLOBIN A1C  05/24/2022   • INFLUENZA VACCINE  08/01/2022   • LIPID PANEL  11/24/2022   • URINE MICROALBUMIN  11/24/2022   • MAMMOGRAM  07/13/2023   • TDAP/TD VACCINES (2 - Td or Tdap) 05/09/2027            Assessment/Plan   CMS Preventative Services Quick Reference  Risk Factors Identified During Encounter  None Identified  The above risks/problems have been discussed with the patient.  Follow up actions/plans if indicated are seen below in the Assessment/Plan Section.  Pertinent information has been shared with the patient in the After Visit Summary.    Diagnoses and all orders for this visit:    1. Welcome to Medicare preventive visit (Primary)        Follow Up:  Return in about 1 year (around 4/25/2023) for Medicare Wellness.     An After Visit Summary and PPPS were made available to the patient.

## 2022-04-29 ENCOUNTER — LAB (OUTPATIENT)
Dept: LAB | Facility: HOSPITAL | Age: 67
End: 2022-04-29

## 2022-04-29 DIAGNOSIS — D64.9 ANEMIA, UNSPECIFIED TYPE: ICD-10-CM

## 2022-04-29 DIAGNOSIS — E78.2 MIXED HYPERLIPIDEMIA: ICD-10-CM

## 2022-04-29 DIAGNOSIS — E11.69 TYPE 2 DIABETES MELLITUS WITH OTHER SPECIFIED COMPLICATION, UNSPECIFIED WHETHER LONG TERM INSULIN USE: ICD-10-CM

## 2022-04-29 DIAGNOSIS — I10 ESSENTIAL HYPERTENSION: ICD-10-CM

## 2022-04-29 LAB
ALBUMIN SERPL-MCNC: 4.2 G/DL (ref 3.5–5.2)
ALBUMIN UR-MCNC: 1.7 MG/DL
ALBUMIN/GLOB SERPL: 1.2 G/DL
ALP SERPL-CCNC: 111 U/L (ref 39–117)
ALT SERPL W P-5'-P-CCNC: 29 U/L (ref 1–33)
ANION GAP SERPL CALCULATED.3IONS-SCNC: 17.7 MMOL/L (ref 5–15)
AST SERPL-CCNC: 16 U/L (ref 1–32)
BASOPHILS # BLD AUTO: 0.04 10*3/MM3 (ref 0–0.2)
BASOPHILS NFR BLD AUTO: 0.7 % (ref 0–1.5)
BILIRUB SERPL-MCNC: 0.3 MG/DL (ref 0–1.2)
BUN SERPL-MCNC: 12 MG/DL (ref 8–23)
BUN/CREAT SERPL: 24 (ref 7–25)
CALCIUM SPEC-SCNC: 9.3 MG/DL (ref 8.6–10.5)
CHLORIDE SERPL-SCNC: 97 MMOL/L (ref 98–107)
CHOLEST SERPL-MCNC: 179 MG/DL (ref 0–200)
CO2 SERPL-SCNC: 22.3 MMOL/L (ref 22–29)
CREAT SERPL-MCNC: 0.5 MG/DL (ref 0.57–1)
DEPRECATED RDW RBC AUTO: 44.1 FL (ref 37–54)
EGFRCR SERPLBLD CKD-EPI 2021: 104.2 ML/MIN/1.73
EOSINOPHIL # BLD AUTO: 0.27 10*3/MM3 (ref 0–0.4)
EOSINOPHIL NFR BLD AUTO: 4.5 % (ref 0.3–6.2)
ERYTHROCYTE [DISTWIDTH] IN BLOOD BY AUTOMATED COUNT: 14.9 % (ref 12.3–15.4)
GLOBULIN UR ELPH-MCNC: 3.5 GM/DL
GLUCOSE SERPL-MCNC: 121 MG/DL (ref 65–99)
HBA1C MFR BLD: 6.1 % (ref 4.8–5.6)
HCT VFR BLD AUTO: 39.5 % (ref 34–46.6)
HDLC SERPL-MCNC: 38 MG/DL (ref 40–60)
HGB BLD-MCNC: 13.2 G/DL (ref 12–15.9)
IMM GRANULOCYTES # BLD AUTO: 0.05 10*3/MM3 (ref 0–0.05)
IMM GRANULOCYTES NFR BLD AUTO: 0.8 % (ref 0–0.5)
IRON 24H UR-MRATE: 51 MCG/DL (ref 37–145)
IRON SATN MFR SERPL: 14 % (ref 20–50)
LDLC SERPL CALC-MCNC: 108 MG/DL (ref 0–100)
LDLC/HDLC SERPL: 2.71 {RATIO}
LYMPHOCYTES # BLD AUTO: 1.33 10*3/MM3 (ref 0.7–3.1)
LYMPHOCYTES NFR BLD AUTO: 21.9 % (ref 19.6–45.3)
MCH RBC QN AUTO: 27.4 PG (ref 26.6–33)
MCHC RBC AUTO-ENTMCNC: 33.4 G/DL (ref 31.5–35.7)
MCV RBC AUTO: 82.1 FL (ref 79–97)
MONOCYTES # BLD AUTO: 0.32 10*3/MM3 (ref 0.1–0.9)
MONOCYTES NFR BLD AUTO: 5.3 % (ref 5–12)
NEUTROPHILS NFR BLD AUTO: 4.05 10*3/MM3 (ref 1.7–7)
NEUTROPHILS NFR BLD AUTO: 66.8 % (ref 42.7–76)
NRBC BLD AUTO-RTO: 0 /100 WBC (ref 0–0.2)
PLATELET # BLD AUTO: 313 10*3/MM3 (ref 140–450)
PMV BLD AUTO: 10.5 FL (ref 6–12)
POTASSIUM SERPL-SCNC: 4.3 MMOL/L (ref 3.5–5.2)
PROT SERPL-MCNC: 7.7 G/DL (ref 6–8.5)
RBC # BLD AUTO: 4.81 10*6/MM3 (ref 3.77–5.28)
SODIUM SERPL-SCNC: 137 MMOL/L (ref 136–145)
T4 FREE SERPL-MCNC: 1.07 NG/DL (ref 0.93–1.7)
TIBC SERPL-MCNC: 370 MCG/DL (ref 298–536)
TRANSFERRIN SERPL-MCNC: 248 MG/DL (ref 200–360)
TRIGL SERPL-MCNC: 190 MG/DL (ref 0–150)
TSH SERPL DL<=0.05 MIU/L-ACNC: 2.03 UIU/ML (ref 0.27–4.2)
VLDLC SERPL-MCNC: 33 MG/DL (ref 5–40)
WBC NRBC COR # BLD: 6.06 10*3/MM3 (ref 3.4–10.8)

## 2022-04-29 PROCEDURE — 80061 LIPID PANEL: CPT

## 2022-04-29 PROCEDURE — 84439 ASSAY OF FREE THYROXINE: CPT

## 2022-04-29 PROCEDURE — 84466 ASSAY OF TRANSFERRIN: CPT

## 2022-04-29 PROCEDURE — 82043 UR ALBUMIN QUANTITATIVE: CPT

## 2022-04-29 PROCEDURE — 84443 ASSAY THYROID STIM HORMONE: CPT

## 2022-04-29 PROCEDURE — 36415 COLL VENOUS BLD VENIPUNCTURE: CPT

## 2022-04-29 PROCEDURE — 83036 HEMOGLOBIN GLYCOSYLATED A1C: CPT

## 2022-04-29 PROCEDURE — 83540 ASSAY OF IRON: CPT

## 2022-04-29 PROCEDURE — 85025 COMPLETE CBC W/AUTO DIFF WBC: CPT

## 2022-04-29 PROCEDURE — 80053 COMPREHEN METABOLIC PANEL: CPT

## 2022-05-02 ENCOUNTER — TELEPHONE (OUTPATIENT)
Dept: FAMILY MEDICINE CLINIC | Facility: CLINIC | Age: 67
End: 2022-05-02

## 2022-05-02 DIAGNOSIS — Z79.4 TYPE 2 DIABETES MELLITUS WITHOUT COMPLICATION, WITH LONG-TERM CURRENT USE OF INSULIN: Primary | ICD-10-CM

## 2022-05-02 DIAGNOSIS — E11.9 TYPE 2 DIABETES MELLITUS WITHOUT COMPLICATION, WITH LONG-TERM CURRENT USE OF INSULIN: Primary | ICD-10-CM

## 2022-05-02 NOTE — TELEPHONE ENCOUNTER
----- Message from VANESSA Laboy sent at 5/1/2022 12:02 PM EDT -----  Trig are high and a1c continues to be slightly elevated but stable  encourage diabetic diet and daily exercise recheck a1c in 3 months and chol in 6 months

## 2022-05-03 ENCOUNTER — TELEPHONE (OUTPATIENT)
Dept: FAMILY MEDICINE CLINIC | Facility: CLINIC | Age: 67
End: 2022-05-03

## 2022-05-03 DIAGNOSIS — Z12.31 ENCOUNTER FOR SCREENING MAMMOGRAM FOR MALIGNANT NEOPLASM OF BREAST: Primary | ICD-10-CM

## 2022-05-03 NOTE — TELEPHONE ENCOUNTER
Caller: Adelita Garcia    Relationship: Self    Best call back number: [phone]    What orders are you requesting (i.e. lab or imaging): MAMMOGRAM     In what timeframe would the patient need to come in: ASAP      Where will you receive your lab/imaging services:     [facility]       Additional notes:     THE PATIENT IS NEEDING AN ORDER FOR A MAMMOGRAM. SHE IS REQUESTING THAT TO BE DONE ASAP.

## 2022-07-27 ENCOUNTER — TELEPHONE (OUTPATIENT)
Dept: FAMILY MEDICINE CLINIC | Facility: CLINIC | Age: 67
End: 2022-07-27

## 2022-07-27 ENCOUNTER — HOSPITAL ENCOUNTER (OUTPATIENT)
Dept: MAMMOGRAPHY | Facility: HOSPITAL | Age: 67
Discharge: HOME | End: 2022-07-27

## 2022-07-27 ENCOUNTER — HOSPITAL ENCOUNTER (OUTPATIENT)
Dept: BONE DENSITY | Facility: HOSPITAL | Age: 67
Discharge: HOME | End: 2022-07-27

## 2022-07-27 DIAGNOSIS — Z12.31 ENCOUNTER FOR SCREENING MAMMOGRAM FOR MALIGNANT NEOPLASM OF BREAST: ICD-10-CM

## 2022-07-27 PROCEDURE — 77063 BREAST TOMOSYNTHESIS BI: CPT

## 2022-07-27 PROCEDURE — 77067 SCR MAMMO BI INCL CAD: CPT

## 2022-07-27 PROCEDURE — 77080 DXA BONE DENSITY AXIAL: CPT

## 2022-07-27 NOTE — TELEPHONE ENCOUNTER
----- Message from VANESSA Laboy sent at 7/27/2022  1:46 PM EDT -----  Normal mammo repeat in 1 year

## 2022-08-11 ENCOUNTER — TELEPHONE (OUTPATIENT)
Dept: FAMILY MEDICINE CLINIC | Facility: CLINIC | Age: 67
End: 2022-08-11

## 2022-08-19 ENCOUNTER — LAB (OUTPATIENT)
Dept: LAB | Facility: HOSPITAL | Age: 67
End: 2022-08-19

## 2022-08-19 DIAGNOSIS — E11.9 TYPE 2 DIABETES MELLITUS WITHOUT COMPLICATION, WITH LONG-TERM CURRENT USE OF INSULIN: ICD-10-CM

## 2022-08-19 DIAGNOSIS — Z79.4 TYPE 2 DIABETES MELLITUS WITHOUT COMPLICATION, WITH LONG-TERM CURRENT USE OF INSULIN: ICD-10-CM

## 2022-08-19 PROCEDURE — 83036 HEMOGLOBIN GLYCOSYLATED A1C: CPT

## 2022-08-19 PROCEDURE — 36415 COLL VENOUS BLD VENIPUNCTURE: CPT

## 2022-08-20 LAB — HBA1C MFR BLD: 6.1 % (ref 4.8–5.6)

## 2022-08-22 ENCOUNTER — TELEPHONE (OUTPATIENT)
Dept: FAMILY MEDICINE CLINIC | Facility: CLINIC | Age: 67
End: 2022-08-22

## 2022-08-22 NOTE — TELEPHONE ENCOUNTER
----- Message from VANESSA Laboy sent at 8/21/2022 11:36 AM EDT -----  A1c stable continue to watch diet and exercise can recheck in 3 months

## 2022-10-05 DIAGNOSIS — F41.9 ANXIETY DISORDER, UNSPECIFIED TYPE: ICD-10-CM

## 2022-10-05 DIAGNOSIS — E11.69 TYPE 2 DIABETES MELLITUS WITH OTHER SPECIFIED COMPLICATION, UNSPECIFIED WHETHER LONG TERM INSULIN USE: ICD-10-CM

## 2022-10-05 DIAGNOSIS — I10 ESSENTIAL HYPERTENSION: ICD-10-CM

## 2022-10-05 DIAGNOSIS — F32.9 MAJOR DEPRESSIVE DISORDER, REMISSION STATUS UNSPECIFIED, UNSPECIFIED WHETHER RECURRENT: ICD-10-CM

## 2022-10-05 DIAGNOSIS — E78.1 HYPERTRIGLYCERIDEMIA: ICD-10-CM

## 2022-10-05 RX ORDER — PRAVASTATIN SODIUM 20 MG
TABLET ORAL
Qty: 90 TABLET | Refills: 1 | Status: SHIPPED | OUTPATIENT
Start: 2022-10-05 | End: 2022-10-06 | Stop reason: SDUPTHER

## 2022-10-05 RX ORDER — FLUOXETINE HYDROCHLORIDE 40 MG/1
CAPSULE ORAL
Qty: 90 CAPSULE | Refills: 1 | Status: SHIPPED | OUTPATIENT
Start: 2022-10-05

## 2022-10-05 RX ORDER — LOSARTAN POTASSIUM AND HYDROCHLOROTHIAZIDE 25; 100 MG/1; MG/1
TABLET ORAL
Qty: 90 TABLET | Refills: 1 | Status: SHIPPED | OUTPATIENT
Start: 2022-10-05 | End: 2023-03-30

## 2022-10-05 RX ORDER — BUPROPION HYDROCHLORIDE 150 MG/1
TABLET ORAL
Qty: 90 TABLET | Refills: 1 | Status: SHIPPED | OUTPATIENT
Start: 2022-10-05

## 2022-10-06 RX ORDER — PRAVASTATIN SODIUM 20 MG
20 TABLET ORAL DAILY
Qty: 90 TABLET | Refills: 1 | Status: SHIPPED | OUTPATIENT
Start: 2022-10-06

## 2022-10-27 ENCOUNTER — OFFICE VISIT (OUTPATIENT)
Dept: FAMILY MEDICINE CLINIC | Facility: CLINIC | Age: 67
End: 2022-10-27

## 2022-10-27 VITALS
OXYGEN SATURATION: 96 % | HEART RATE: 88 BPM | SYSTOLIC BLOOD PRESSURE: 138 MMHG | WEIGHT: 215 LBS | BODY MASS INDEX: 42.21 KG/M2 | HEIGHT: 60 IN | DIASTOLIC BLOOD PRESSURE: 75 MMHG

## 2022-10-27 DIAGNOSIS — E78.2 MIXED HYPERLIPIDEMIA: ICD-10-CM

## 2022-10-27 DIAGNOSIS — Z12.11 SCREEN FOR COLON CANCER: ICD-10-CM

## 2022-10-27 DIAGNOSIS — E55.9 VITAMIN D DEFICIENCY: ICD-10-CM

## 2022-10-27 DIAGNOSIS — F33.0 MILD EPISODE OF RECURRENT MAJOR DEPRESSIVE DISORDER: ICD-10-CM

## 2022-10-27 DIAGNOSIS — E11.9 TYPE 2 DIABETES MELLITUS WITHOUT COMPLICATION, WITHOUT LONG-TERM CURRENT USE OF INSULIN: ICD-10-CM

## 2022-10-27 DIAGNOSIS — I10 ESSENTIAL HYPERTENSION: Primary | ICD-10-CM

## 2022-10-27 DIAGNOSIS — D64.9 ANEMIA, UNSPECIFIED TYPE: ICD-10-CM

## 2022-10-27 DIAGNOSIS — M19.90 ARTHRITIS: ICD-10-CM

## 2022-10-27 PROCEDURE — 99214 OFFICE O/P EST MOD 30 MIN: CPT | Performed by: NURSE PRACTITIONER

## 2022-10-27 RX ORDER — DICLOFENAC SODIUM 75 MG/1
75 TABLET, DELAYED RELEASE ORAL 2 TIMES DAILY
Qty: 180 TABLET | Refills: 0 | Status: SHIPPED | OUTPATIENT
Start: 2022-10-27 | End: 2023-01-05

## 2022-10-27 RX ORDER — DICLOFENAC SODIUM 75 MG/1
75 TABLET, DELAYED RELEASE ORAL 2 TIMES DAILY
COMMUNITY
End: 2022-10-27 | Stop reason: SDUPTHER

## 2022-10-27 NOTE — PROGRESS NOTES
Chief Complaint  Anxiety, Anemia, Depression, Hypertension, Hyperlipidemia, and Diabetes    Subjective            Adelita Garcia presents to [facility]  History of Present Illness  Pt is a 6 mo f/u for DM, HTN, HLD, anemia, anxiety and depression. No questions or concerns at this time.    Pt would like a refill on her diclofenac. Pt states her arthritis has been flaring up.    Pt will get flu shot next week at there pharmacy.    Pt is due DM foot and eye exam. Pt was given eye paper and will self schedule.     Pt is due colonoscopy and would like to return to Dr. Harris.     Pt is due labs.     Pt is due MAW. Pt will have via telehealth - [employer].         Past Medical History:   Diagnosis Date   • Abnormal bone density screening 10/17/2019    NORMAL   • Anemia    • Anxiety disorder    • Arthritis 2015   • Diabetes mellitus (HCC)     TYPE 2   • Diabetic eye exam (HCC) 2019    SCANNED IN CHART   • Essential hypertension    • Eye exam, routine 2019    YEARLY EYE EXAM   • Hyperlipidemia    • Hypertriglyceridemia 09/15/2017   • Insomnia 2015   • Joint pain    • Major depressive disorder 2018   • Pap smear for cervical cancer screening     NO LONGER   • Visit for screening mammogram 01/15/2020    NORMAL   • Vitamin D deficiency 2018       No Known Allergies     Past Surgical History:   Procedure Laterality Date   • BREAST SURGERY      Benign breast biopsies   •  SECTION     • COLONOSCOPY  2012    -NORMAL REPEAT IN 10 YEARS    • DILATION AND CURETTAGE, DIAGNOSTIC / THERAPEUTIC  ,   • EYE SURGERY     • HYSTERECTOMY     • JOINT REPLACEMENT     • TUBAL ABDOMINAL LIGATION          Social History     Tobacco Use   • Smoking status: Never   • Smokeless tobacco: Never   Substance Use Topics   • Alcohol use: Never       Family History   Problem Relation Age of Onset   • Stroke Mother    • Heart disease  "Mother         Bypass age 83. Still living age 96   • Hypertension Mother    • Heart disease Father         Abdominal aneurysm.  after surgery age 69   • Hypertension Father    • Heart disease Brother         Heart attacks age 45. Stints. Current age 73   • Arthritis Brother    • Heart disease Brother         Bypass age 72. Current age 78        Current Outpatient Medications on File Prior to Visit   Medication Sig   • aspirin 81 MG EC tablet Take 1 tablet by mouth Daily.   • baclofen (LIORESAL) 10 MG tablet Take 1 tablet by mouth Daily.   • buPROPion XL (WELLBUTRIN XL) 150 MG 24 hr tablet TAKE 1 TABLET DAILY   • Cholecalciferol 50 MCG (2000 UT) tablet Vitamin D3 2,000 unit oral tablet take 1 tablet by oral route daily   Active   • FeroSul 325 (65 Fe) MG tablet Take 1 tablet by mouth 2 (Two) Times a Day.   • FLUoxetine (PROzac) 40 MG capsule TAKE 1 CAPSULE EVERY MORNING   • losartan-hydrochlorothiazide (HYZAAR) 100-25 MG per tablet TAKE 1 TABLET DAILY   • metFORMIN (GLUCOPHAGE) 1000 MG tablet Take 1 tablet by mouth 2 (Two) Times a Day With Meals.   • pravastatin (PRAVACHOL) 20 MG tablet Take 1 tablet by mouth Daily.   • [DISCONTINUED] diclofenac (VOLTAREN) 75 MG EC tablet Take 1 tablet by mouth 2 (Two) Times a Day.   • [DISCONTINUED] azithromycin (Zithromax Z-Lebron) 250 MG tablet Take 2 tablets by mouth on day 1, then 1 tablet daily on days 2-5   • [DISCONTINUED] DICLOFENAC POTASSIUM PO Take 75 mg by mouth 2 (two) times a day.     No current facility-administered medications on file prior to visit.       Health Maintenance Due   Topic Date Due   • DIABETIC EYE EXAM  2021   • DIABETIC FOOT EXAM  2022   • COLORECTAL CANCER SCREENING  2022       Objective     /75   Pulse 88   Ht 153 cm (60.25\")   Wt 97.5 kg (215 lb)   SpO2 96%   BMI 41.64 kg/m²       Physical Exam  Constitutional:       General: She is not in acute distress.     Appearance: Normal appearance. She is not ill-appearing. "   HENT:      Head: Normocephalic and atraumatic.   Cardiovascular:      Rate and Rhythm: Normal rate and regular rhythm.      Pulses:           Dorsalis pedis pulses are 2+ on the right side and 2+ on the left side.      Heart sounds: Normal heart sounds. No murmur heard.  Pulmonary:      Effort: Pulmonary effort is normal. No respiratory distress.      Breath sounds: Normal breath sounds.   Chest:      Chest wall: No tenderness.   Abdominal:      General: There is no distension.      Palpations: There is no mass.      Tenderness: There is no abdominal tenderness. There is no guarding.   Musculoskeletal:         General: No swelling or tenderness. Normal range of motion.      Cervical back: Normal range of motion and neck supple.   Feet:      Right foot:      Protective Sensation: 3 sites tested. 3 sites sensed.      Skin integrity: Skin integrity normal. No ulcer or blister.      Toenail Condition: Right toenails are normal.      Left foot:      Protective Sensation: 3 sites tested. 3 sites sensed.      Skin integrity: Skin integrity normal. No ulcer or blister.      Toenail Condition: Left toenails are normal.      Comments:      Skin:     General: Skin is warm and dry.      Findings: No rash.   Neurological:      General: No focal deficit present.      Mental Status: She is alert and oriented to person, place, and time. Mental status is at baseline.      Gait: Gait normal.   Psychiatric:         Mood and Affect: Mood normal.         Behavior: Behavior normal.         Thought Content: Thought content normal.         Judgment: Judgment normal.       Monofilament Test Performed    Result Review :                           Assessment and Plan        Diagnoses and all orders for this visit:    1. Essential hypertension (Primary)  Comments:  stable on hyzaar 100/25mg, continue  Orders:  -     CBC w AUTO Differential; Future  -     Comprehensive metabolic panel; Future  -     Lipid panel; Future    2. Screen for colon  cancer  -     Ambulatory Referral For Screening Colonoscopy    3. Mixed hyperlipidemia  Comments:  stable on pravastatin 20mg, continue  Orders:  -     Comprehensive metabolic panel; Future  -     Lipid panel; Future    4. Vitamin D deficiency  -     Vitamin D 25 hydroxy; Future    5. Anemia, unspecified type  -     CBC w AUTO Differential; Future  -     Iron Profile; Future    6. Type 2 diabetes mellitus without complication, without long-term current use of insulin (HCC)  Comments:  stable on metformin, continue  Orders:  -     Hemoglobin A1c; Future  -     CBC w AUTO Differential; Future  -     Comprehensive metabolic panel; Future  -     Lipid panel; Future  -     TSH; Future  -     T4, free; Future  -     MicroAlbumin, Urine, Random - Urine, Clean Catch; Future    7. Arthritis  Comments:  stable on votaren, continue  Orders:  -     diclofenac (VOLTAREN) 75 MG EC tablet; Take 1 tablet by mouth 2 (Two) Times a Day.  Dispense: 180 tablet; Refill: 0    8. Mild episode of recurrent major depressive disorder (HCC)  Comments:  stable on wellbutrin 150mg, continue              Follow Up     Return in about 6 months (around 4/27/2023).    Patient was given instructions and counseling regarding her condition or for health maintenance advice. Please see specific information pulled into the AVS if appropriate.     Adelita Garcia  reports that she has never smoked. She has never used smokeless tobacco..

## 2022-11-15 ENCOUNTER — OFFICE VISIT (OUTPATIENT)
Dept: FAMILY MEDICINE CLINIC | Facility: CLINIC | Age: 67
End: 2022-11-15

## 2022-11-15 VITALS
BODY MASS INDEX: 37.92 KG/M2 | HEART RATE: 92 BPM | HEIGHT: 63 IN | WEIGHT: 214 LBS | SYSTOLIC BLOOD PRESSURE: 137 MMHG | DIASTOLIC BLOOD PRESSURE: 68 MMHG | OXYGEN SATURATION: 96 %

## 2022-11-15 DIAGNOSIS — E11.9 TYPE 2 DIABETES MELLITUS WITHOUT COMPLICATION, WITHOUT LONG-TERM CURRENT USE OF INSULIN: ICD-10-CM

## 2022-11-15 DIAGNOSIS — E78.5 HYPERLIPIDEMIA, UNSPECIFIED HYPERLIPIDEMIA TYPE: ICD-10-CM

## 2022-11-15 DIAGNOSIS — M25.50 ARTHRALGIA, UNSPECIFIED JOINT: ICD-10-CM

## 2022-11-15 DIAGNOSIS — I10 PRIMARY HYPERTENSION: ICD-10-CM

## 2022-11-15 DIAGNOSIS — F33.0 MILD EPISODE OF RECURRENT MAJOR DEPRESSIVE DISORDER: Primary | ICD-10-CM

## 2022-11-15 PROCEDURE — 99214 OFFICE O/P EST MOD 30 MIN: CPT | Performed by: NURSE PRACTITIONER

## 2022-11-15 RX ORDER — BACLOFEN 10 MG/1
10 TABLET ORAL DAILY
Qty: 90 TABLET | Refills: 1 | Status: SHIPPED | OUTPATIENT
Start: 2022-11-15

## 2022-11-15 NOTE — PROGRESS NOTES
Chief Complaint  HTN, HLD, DM 2, Depression, arthritis    Subjective            Adelita Garcia presents to [facility]  History of Present Illness  Pt is here for 6 month f/u for DM 2, HTN, HLD, Depression, arthritis, anemia.  Pt has no complaints.    PT has pending labwork.    Pt had diabetic eye exam at [facility] in 2022.    Colonoscopy scheduled for may 2023.    She will schedule a MAW with our office.        Past Medical History:   Diagnosis Date   • Abnormal bone density screening 10/17/2019    NORMAL   • Anemia    • Anxiety disorder    • Arthritis 2015   • Diabetes mellitus (HCC)     TYPE 2   • Diabetic eye exam (HCC) 2019    SCANNED IN CHART   • Essential hypertension    • Eye exam, routine 2019    YEARLY EYE EXAM   • Hyperlipidemia    • Hypertriglyceridemia 09/15/2017   • Insomnia 2015   • Joint pain    • Major depressive disorder 2018   • Pap smear for cervical cancer screening     NO LONGER   • Visit for screening mammogram 01/15/2020    NORMAL   • Vitamin D deficiency 2018       No Known Allergies     Past Surgical History:   Procedure Laterality Date   • BREAST SURGERY      Benign breast biopsies   •  SECTION  ,   • COLONOSCOPY  2012    -NORMAL REPEAT IN 10 YEARS    • DILATION AND CURETTAGE, DIAGNOSTIC / THERAPEUTIC  ,   • EYE SURGERY     • HYSTERECTOMY     • JOINT REPLACEMENT     • TUBAL ABDOMINAL LIGATION          Social History     Tobacco Use   • Smoking status: Never   • Smokeless tobacco: Never   Substance Use Topics   • Alcohol use: Never       Family History   Problem Relation Age of Onset   • Stroke Mother    • Heart disease Mother         Bypass age 83. Still living age 96   • Hypertension Mother    • Heart disease Father         Abdominal aneurysm.  after surgery age 69   • Hypertension Father    • Heart disease Brother         Heart attacks age 45.  "Stints. Current age 73   • Arthritis Brother    • Heart disease Brother         Bypass age 72. Current age 78        Current Outpatient Medications on File Prior to Visit   Medication Sig   • aspirin 81 MG EC tablet Take 1 tablet by mouth Daily.   • buPROPion XL (WELLBUTRIN XL) 150 MG 24 hr tablet TAKE 1 TABLET DAILY   • Cholecalciferol 50 MCG (2000 UT) tablet Vitamin D3 2,000 unit oral tablet take 1 tablet by oral route daily   Active   • diclofenac (VOLTAREN) 75 MG EC tablet Take 1 tablet by mouth 2 (Two) Times a Day.   • FeroSul 325 (65 Fe) MG tablet Take 1 tablet by mouth 2 (Two) Times a Day.   • FLUoxetine (PROzac) 40 MG capsule TAKE 1 CAPSULE EVERY MORNING   • losartan-hydrochlorothiazide (HYZAAR) 100-25 MG per tablet TAKE 1 TABLET DAILY   • metFORMIN (GLUCOPHAGE) 1000 MG tablet Take 1 tablet by mouth 2 (Two) Times a Day With Meals.   • pravastatin (PRAVACHOL) 20 MG tablet Take 1 tablet by mouth Daily.   • [DISCONTINUED] baclofen (LIORESAL) 10 MG tablet Take 1 tablet by mouth Daily.     No current facility-administered medications on file prior to visit.       Health Maintenance Due   Topic Date Due   • DIABETIC EYE EXAM  05/22/2021   • COLORECTAL CANCER SCREENING  07/06/2022       Objective     /68 (BP Location: Left arm)   Pulse 92   Ht 160 cm (63\")   Wt 97.1 kg (214 lb)   SpO2 96%   BMI 37.91 kg/m²       Physical Exam  Constitutional:       General: She is not in acute distress.     Appearance: Normal appearance. She is not ill-appearing.   HENT:      Head: Normocephalic and atraumatic.   Cardiovascular:      Rate and Rhythm: Normal rate and regular rhythm.      Heart sounds: Normal heart sounds. No murmur heard.  Pulmonary:      Effort: Pulmonary effort is normal. No respiratory distress.      Breath sounds: Normal breath sounds.   Chest:      Chest wall: No tenderness.   Abdominal:      General: Abdomen is flat. Bowel sounds are normal. There is no distension.      Palpations: Abdomen is soft. " There is no mass.      Tenderness: There is no abdominal tenderness. There is no guarding.   Musculoskeletal:         General: No swelling or tenderness. Normal range of motion.      Cervical back: Normal range of motion and neck supple.   Skin:     General: Skin is warm and dry.      Findings: No rash.   Neurological:      General: No focal deficit present.      Mental Status: She is alert and oriented to person, place, and time. Mental status is at baseline.      Gait: Gait normal.   Psychiatric:         Attention and Perception: Attention normal.         Mood and Affect: Mood normal.         Speech: Speech normal.         Behavior: Behavior normal. Behavior is cooperative.         Thought Content: Thought content normal. Thought content does not include suicidal ideation.         Judgment: Judgment normal.           Result Review :                           Assessment and Plan        Diagnoses and all orders for this visit:    1. Mild episode of recurrent major depressive disorder (HCC) (Primary)  Comments:  stable on wellbutrin 150mg and prozac 40mg, , continue    2. Arthralgia, unspecified joint  Comments:  stable on diclofenac 75mg adn baclofen, continue  Orders:  -     baclofen (LIORESAL) 10 MG tablet; Take 1 tablet by mouth Daily.  Dispense: 90 tablet; Refill: 1    3. Primary hypertension  Comments:  stable on hyzaar 100/25mg, continue    4. Hyperlipidemia, unspecified hyperlipidemia type  Comments:  stable on pravachol 40mg, continue    5. Type 2 diabetes mellitus without complication, without long-term current use of insulin (HCC)  Comments:  stable on metformin 1000mg, continue              Follow Up     Return in about 6 months (around 5/15/2023).    Patient was given instructions and counseling regarding her condition or for health maintenance advice. Please see specific information pulled into the AVS if appropriate.              VANESSA Laboy

## 2022-11-21 ENCOUNTER — TELEPHONE (OUTPATIENT)
Dept: FAMILY MEDICINE CLINIC | Facility: CLINIC | Age: 67
End: 2022-11-21

## 2022-11-21 DIAGNOSIS — F41.9 ANXIETY: Primary | ICD-10-CM

## 2022-11-21 RX ORDER — LORAZEPAM 1 MG/1
1 TABLET ORAL 3 TIMES DAILY PRN
Qty: 30 TABLET | Refills: 0 | Status: SHIPPED | OUTPATIENT
Start: 2022-11-21 | End: 2022-12-13

## 2022-11-21 NOTE — TELEPHONE ENCOUNTER
Caller: Adelita Garcia    Relationship to patient: Self    Best call back number: [phone]    Patient is needing: PATIENT WOULD LIKE FOR [facility]'S ASSISTANT TO GO AHEAD AND SUBMIT THIS INTO PHARMACY ASAP. SHE WILL BE MAKING  ARRANGEMENTS TODAY AND WOULD LIKE TO HAVE THESE ASAP.

## 2022-11-21 NOTE — TELEPHONE ENCOUNTER
Caller: Adelita Garcia    Relationship: Self    Best call back number:    [phone]      What medication are you requesting: SLEEPING AID AND MILD TRANQUILIZER    What are your current symptoms: NOT SLEEPING AND NEEDS TO FUNCTION FOR MOM PASSED AWAY THIS MORNING    How long have you been experiencing symptoms: SINCE THIS MORNING    Have you had these symptoms before:    [] Yes  [x] No    Have you been treated for these symptoms before:   [] Yes  [x] No    If a prescription is needed, what is your preferred pharmacy and phone number: [facility] [address] [place] [address] [phone] [facility] [phone] [facility]     Additional notes: PLEASE CALL AND ADVISE IF THIS CAN BE DONE

## 2022-12-06 ENCOUNTER — LAB (OUTPATIENT)
Dept: LAB | Facility: HOSPITAL | Age: 67
End: 2022-12-06

## 2022-12-06 DIAGNOSIS — E55.9 VITAMIN D DEFICIENCY: ICD-10-CM

## 2022-12-06 DIAGNOSIS — E11.9 TYPE 2 DIABETES MELLITUS WITHOUT COMPLICATION, WITHOUT LONG-TERM CURRENT USE OF INSULIN: ICD-10-CM

## 2022-12-06 DIAGNOSIS — I10 ESSENTIAL HYPERTENSION: ICD-10-CM

## 2022-12-06 DIAGNOSIS — D64.9 ANEMIA, UNSPECIFIED TYPE: ICD-10-CM

## 2022-12-06 DIAGNOSIS — E78.2 MIXED HYPERLIPIDEMIA: ICD-10-CM

## 2022-12-06 LAB
25(OH)D3 SERPL-MCNC: 37.3 NG/ML (ref 30–100)
ALBUMIN SERPL-MCNC: 4.1 G/DL (ref 3.5–5.2)
ALBUMIN UR-MCNC: 1.7 MG/DL
ALBUMIN/GLOB SERPL: 1.5 G/DL
ALP SERPL-CCNC: 107 U/L (ref 39–117)
ALT SERPL W P-5'-P-CCNC: 14 U/L (ref 1–33)
ANION GAP SERPL CALCULATED.3IONS-SCNC: 11 MMOL/L (ref 5–15)
AST SERPL-CCNC: 11 U/L (ref 1–32)
BASOPHILS # BLD AUTO: 0.05 10*3/MM3 (ref 0–0.2)
BASOPHILS NFR BLD AUTO: 0.6 % (ref 0–1.5)
BILIRUB SERPL-MCNC: 0.2 MG/DL (ref 0–1.2)
BUN SERPL-MCNC: 16 MG/DL (ref 8–23)
BUN/CREAT SERPL: 26.7 (ref 7–25)
CALCIUM SPEC-SCNC: 9.5 MG/DL (ref 8.6–10.5)
CHLORIDE SERPL-SCNC: 99 MMOL/L (ref 98–107)
CHOLEST SERPL-MCNC: 182 MG/DL (ref 0–200)
CO2 SERPL-SCNC: 28 MMOL/L (ref 22–29)
CREAT SERPL-MCNC: 0.6 MG/DL (ref 0.57–1)
DEPRECATED RDW RBC AUTO: 40.9 FL (ref 37–54)
EGFRCR SERPLBLD CKD-EPI 2021: 99.1 ML/MIN/1.73
EOSINOPHIL # BLD AUTO: 0.29 10*3/MM3 (ref 0–0.4)
EOSINOPHIL NFR BLD AUTO: 3.3 % (ref 0.3–6.2)
ERYTHROCYTE [DISTWIDTH] IN BLOOD BY AUTOMATED COUNT: 14.1 % (ref 12.3–15.4)
GLOBULIN UR ELPH-MCNC: 2.8 GM/DL
GLUCOSE SERPL-MCNC: 110 MG/DL (ref 65–99)
HBA1C MFR BLD: 6.3 % (ref 4.8–5.6)
HCT VFR BLD AUTO: 35.8 % (ref 34–46.6)
HDLC SERPL-MCNC: 40 MG/DL (ref 40–60)
HGB BLD-MCNC: 12 G/DL (ref 12–15.9)
IMM GRANULOCYTES # BLD AUTO: 0.04 10*3/MM3 (ref 0–0.05)
IMM GRANULOCYTES NFR BLD AUTO: 0.5 % (ref 0–0.5)
IRON 24H UR-MRATE: 41 MCG/DL (ref 37–145)
IRON SATN MFR SERPL: 11 % (ref 20–50)
LDLC SERPL CALC-MCNC: 111 MG/DL (ref 0–100)
LDLC/HDLC SERPL: 2.68 {RATIO}
LYMPHOCYTES # BLD AUTO: 2.06 10*3/MM3 (ref 0.7–3.1)
LYMPHOCYTES NFR BLD AUTO: 23.3 % (ref 19.6–45.3)
MCH RBC QN AUTO: 26.7 PG (ref 26.6–33)
MCHC RBC AUTO-ENTMCNC: 33.5 G/DL (ref 31.5–35.7)
MCV RBC AUTO: 79.7 FL (ref 79–97)
MONOCYTES # BLD AUTO: 0.47 10*3/MM3 (ref 0.1–0.9)
MONOCYTES NFR BLD AUTO: 5.3 % (ref 5–12)
NEUTROPHILS NFR BLD AUTO: 5.94 10*3/MM3 (ref 1.7–7)
NEUTROPHILS NFR BLD AUTO: 67 % (ref 42.7–76)
NRBC BLD AUTO-RTO: 0 /100 WBC (ref 0–0.2)
PLATELET # BLD AUTO: 313 10*3/MM3 (ref 140–450)
PMV BLD AUTO: 10.5 FL (ref 6–12)
POTASSIUM SERPL-SCNC: 3.7 MMOL/L (ref 3.5–5.2)
PROT SERPL-MCNC: 6.9 G/DL (ref 6–8.5)
RBC # BLD AUTO: 4.49 10*6/MM3 (ref 3.77–5.28)
SODIUM SERPL-SCNC: 138 MMOL/L (ref 136–145)
T4 FREE SERPL-MCNC: 1.15 NG/DL (ref 0.93–1.7)
TIBC SERPL-MCNC: 367 MCG/DL (ref 298–536)
TRANSFERRIN SERPL-MCNC: 246 MG/DL (ref 200–360)
TRIGL SERPL-MCNC: 175 MG/DL (ref 0–150)
TSH SERPL DL<=0.05 MIU/L-ACNC: 1.78 UIU/ML (ref 0.27–4.2)
VLDLC SERPL-MCNC: 31 MG/DL (ref 5–40)
WBC NRBC COR # BLD: 8.85 10*3/MM3 (ref 3.4–10.8)

## 2022-12-06 PROCEDURE — 82306 VITAMIN D 25 HYDROXY: CPT

## 2022-12-06 PROCEDURE — 83036 HEMOGLOBIN GLYCOSYLATED A1C: CPT

## 2022-12-06 PROCEDURE — 80053 COMPREHEN METABOLIC PANEL: CPT

## 2022-12-06 PROCEDURE — 84439 ASSAY OF FREE THYROXINE: CPT

## 2022-12-06 PROCEDURE — 84466 ASSAY OF TRANSFERRIN: CPT

## 2022-12-06 PROCEDURE — 85025 COMPLETE CBC W/AUTO DIFF WBC: CPT

## 2022-12-06 PROCEDURE — 36415 COLL VENOUS BLD VENIPUNCTURE: CPT

## 2022-12-06 PROCEDURE — 82043 UR ALBUMIN QUANTITATIVE: CPT

## 2022-12-06 PROCEDURE — 84443 ASSAY THYROID STIM HORMONE: CPT

## 2022-12-06 PROCEDURE — 80061 LIPID PANEL: CPT

## 2022-12-06 PROCEDURE — 83540 ASSAY OF IRON: CPT

## 2022-12-13 ENCOUNTER — TELEMEDICINE (OUTPATIENT)
Dept: FAMILY MEDICINE CLINIC | Facility: CLINIC | Age: 67
End: 2022-12-13

## 2022-12-13 VITALS — HEIGHT: 63 IN | BODY MASS INDEX: 37.92 KG/M2 | WEIGHT: 214 LBS

## 2022-12-13 DIAGNOSIS — Z00.00 MEDICARE ANNUAL WELLNESS VISIT, SUBSEQUENT: Primary | ICD-10-CM

## 2022-12-13 PROCEDURE — 1126F AMNT PAIN NOTED NONE PRSNT: CPT | Performed by: NURSE PRACTITIONER

## 2022-12-13 PROCEDURE — 1160F RVW MEDS BY RX/DR IN RCRD: CPT | Performed by: NURSE PRACTITIONER

## 2022-12-13 PROCEDURE — G0439 PPPS, SUBSEQ VISIT: HCPCS | Performed by: NURSE PRACTITIONER

## 2022-12-13 PROCEDURE — 1159F MED LIST DOCD IN RCRD: CPT | Performed by: NURSE PRACTITIONER

## 2022-12-13 PROCEDURE — 1170F FXNL STATUS ASSESSED: CPT | Performed by: NURSE PRACTITIONER

## 2022-12-13 NOTE — PROGRESS NOTES
You have chosen to receive care through a telehealth visit.  Do you consent to use a video/audio connection for your medical care today? Yes    Mode of visit: video    Location of patient [place], [place]    Location of provider: [facility]       The ABCs of the Annual Wellness Visit  Subsequent Medicare Wellness Visit    Subjective      Adelita Garcia is a 67 y.o. female who presents for a Subsequent Medicare Wellness Visit.    The following portions of the patient's history were reviewed and   updated as appropriate: allergies, current medications, past family history, past medical history, past social history, past surgical history and problem list.    Compared to one year ago, the patient feels her physical   health is the same.    Compared to one year ago, the patient feels her mental   health is the same.    Recent Hospitalizations:  She was not admitted to the hospital during the last year.       Current Medical Providers:  Patient Care Team:  Nesha Gomez APRN as PCP - General (Nurse Practitioner)    Outpatient Medications Prior to Visit   Medication Sig Dispense Refill   • aspirin 81 MG EC tablet Take 1 tablet by mouth Daily. 90 tablet 1   • baclofen (LIORESAL) 10 MG tablet Take 1 tablet by mouth Daily. 90 tablet 1   • buPROPion XL (WELLBUTRIN XL) 150 MG 24 hr tablet TAKE 1 TABLET DAILY 90 tablet 1   • Cholecalciferol 50 MCG (2000 UT) tablet Vitamin D3 2,000 unit oral tablet take 1 tablet by oral route daily   Active     • diclofenac (VOLTAREN) 75 MG EC tablet Take 1 tablet by mouth 2 (Two) Times a Day. 180 tablet 0   • FeroSul 325 (65 Fe) MG tablet Take 1 tablet by mouth 2 (Two) Times a Day. 180 tablet 1   • FLUoxetine (PROzac) 40 MG capsule TAKE 1 CAPSULE EVERY MORNING 90 capsule 1   • losartan-hydrochlorothiazide (HYZAAR) 100-25 MG per tablet TAKE 1 TABLET DAILY 90 tablet 1   • metFORMIN (GLUCOPHAGE) 1000 MG tablet Take 1 tablet by mouth 2 (Two) Times a Day With Meals. 180 tablet 1   •  "pravastatin (PRAVACHOL) 20 MG tablet Take 1 tablet by mouth Daily. 90 tablet 1   • LORazepam (Ativan) 1 MG tablet Take 1 tablet by mouth 3 (Three) Times a Day As Needed for Anxiety. 30 tablet 0     No facility-administered medications prior to visit.       No opioid medication identified on active medication list. I have reviewed chart for other potential  high risk medication/s and harmful drug interactions in the elderly.          Aspirin is on active medication list. Aspirin use is indicated based on review of current medical condition/s. Pros and cons of this therapy have been discussed today. Benefits of this medication outweigh potential harm.  Patient has been encouraged to continue taking this medication.  .      Patient Active Problem List   Diagnosis   • Anemia   • Anxiety disorder   • Arthritis   • Essential hypertension   • History of total knee replacement   • Hyperlipidemia   • Hypertriglyceridemia   • Insomnia   • Joint pain   • Major depressive disorder   • Vitamin D deficiency   • Osteoarthritis   • Type 2 diabetes mellitus without complication (HCC)     Advance Care Planning  Advance Directive is not on file.  ACP discussion was held with the patient during this visit. Patient does not have an advance directive, information provided.     Objective    Vitals:    12/13/22 0948   Weight: 97.1 kg (214 lb)   Height: 160 cm (63\")   PainSc: 0-No pain     Estimated body mass index is 37.91 kg/m² as calculated from the following:    Height as of this encounter: 160 cm (63\").    Weight as of this encounter: 97.1 kg (214 lb).    Class 2 Severe Obesity (BMI >=35 and <=39.9). Obesity-related health conditions include the following: diabetes mellitus. Obesity is improving with lifestyle modifications. BMI is is above average; BMI management plan is completed. We discussed low calorie, low carb based diet program, portion control and increasing exercise.      Does the patient have evidence of cognitive " impairment?   No    Lab Results   Component Value Date    TRIG 175 (H) 12/06/2022    HDL 40 12/06/2022     (H) 12/06/2022    VLDL 31 12/06/2022    HGBA1C 6.30 (H) 12/06/2022          HEALTH RISK ASSESSMENT    Smoking Status:  Social History     Tobacco Use   Smoking Status Never   Smokeless Tobacco Never     Alcohol Consumption:  Social History     Substance and Sexual Activity   Alcohol Use Never     Fall Risk Screen:    STEADI Fall Risk Assessment was completed, and patient is at LOW risk for falls.Assessment completed on:12/13/2022    Depression Screening:  PHQ-2/PHQ-9 Depression Screening 12/13/2022   Retired PHQ-9 Total Score -   Retired Total Score -   Little Interest or Pleasure in Doing Things 0-->not at all   Feeling Down, Depressed or Hopeless 0-->not at all   PHQ-9: Brief Depression Severity Measure Score 0       Health Habits and Functional and Cognitive Screening:  Functional & Cognitive Status 12/13/2022   Do you have difficulty preparing food and eating? No   Do you have difficulty bathing yourself, getting dressed or grooming yourself? No   Do you have difficulty using the toilet? No   Do you have difficulty moving around from place to place? No   Do you have trouble with steps or getting out of a bed or a chair? No   Current Diet Well Balanced Diet   Dental Exam Up to date   Eye Exam Up to date   Exercise (times per week) 5 times per week   Current Exercises Include House Cleaning;Yard Work   Do you need help using the phone?  No   Are you deaf or do you have serious difficulty hearing?  No   Do you need help with transportation? No   Do you need help shopping? No   Do you need help preparing meals?  No   Do you need help with housework?  No   Do you need help with laundry? No   Do you need help taking your medications? No   Do you need help managing money? No   Do you ever drive or ride in a car without wearing a seat belt? No   Have you felt unusual stress, anger or loneliness in the last  month? No   Who do you live with? Spouse   If you need help, do you have trouble finding someone available to you? No   Have you been bothered in the last four weeks by sexual problems? No   Do you have difficulty concentrating, remembering or making decisions? No       Age-appropriate Screening Schedule:  Refer to the list below for future screening recommendations based on patient's age, sex and/or medical conditions. Orders for these recommended tests are listed in the plan section. The patient has been provided with a written plan.    Health Maintenance   Topic Date Due   • DIABETIC EYE EXAM  05/22/2021   • INFLUENZA VACCINE  03/31/2023 (Originally 8/1/2022)   • HEMOGLOBIN A1C  06/06/2023   • DIABETIC FOOT EXAM  10/27/2023   • LIPID PANEL  12/06/2023   • URINE MICROALBUMIN  12/06/2023   • MAMMOGRAM  07/27/2024   • DXA SCAN  07/27/2024   • TDAP/TD VACCINES (2 - Td or Tdap) 05/09/2027   • ZOSTER VACCINE  Completed                CMS Preventative Services Quick Reference  Risk Factors Identified During Encounter:    None Identified    The above risks/problems have been discussed with the patient.  Pertinent information has been shared with the patient in the After Visit Summary.    Diagnoses and all orders for this visit:    1. Medicare annual wellness visit, subsequent (Primary)        Follow Up:   Next Medicare Wellness visit to be scheduled in 1 year.      An After Visit Summary and PPPS were made available to the patient.

## 2023-01-04 DIAGNOSIS — M19.90 ARTHRITIS: ICD-10-CM

## 2023-01-05 RX ORDER — DICLOFENAC SODIUM 75 MG/1
TABLET, DELAYED RELEASE ORAL
Qty: 180 TABLET | Refills: 1 | Status: SHIPPED | OUTPATIENT
Start: 2023-01-05

## 2023-03-30 DIAGNOSIS — I10 ESSENTIAL HYPERTENSION: ICD-10-CM

## 2023-03-30 RX ORDER — LOSARTAN POTASSIUM AND HYDROCHLOROTHIAZIDE 25; 100 MG/1; MG/1
TABLET ORAL
Qty: 90 TABLET | Refills: 1 | Status: SHIPPED | OUTPATIENT
Start: 2023-03-30

## 2023-03-30 NOTE — TELEPHONE ENCOUNTER
ARB Protocol Failed 03/30/2023 01:14 AM   Protocol Details  BP under 130/80 in past year and patient has diabetes, CAD or PVD        Okay to fill?  OV 11/15/22  F/U 5/15/23

## 2023-05-07 DIAGNOSIS — E78.1 HYPERTRIGLYCERIDEMIA: ICD-10-CM

## 2023-05-08 RX ORDER — PRAVASTATIN SODIUM 20 MG
TABLET ORAL
Qty: 90 TABLET | Refills: 3 | Status: SHIPPED | OUTPATIENT
Start: 2023-05-08 | End: 2023-05-15 | Stop reason: SDUPTHER

## 2023-05-15 ENCOUNTER — OFFICE VISIT (OUTPATIENT)
Dept: FAMILY MEDICINE CLINIC | Facility: CLINIC | Age: 68
End: 2023-05-15
Payer: MEDICARE

## 2023-05-15 VITALS
HEIGHT: 63 IN | SYSTOLIC BLOOD PRESSURE: 132 MMHG | HEART RATE: 90 BPM | WEIGHT: 213 LBS | DIASTOLIC BLOOD PRESSURE: 57 MMHG | OXYGEN SATURATION: 96 % | BODY MASS INDEX: 37.74 KG/M2

## 2023-05-15 DIAGNOSIS — E11.69 TYPE 2 DIABETES MELLITUS WITH OTHER SPECIFIED COMPLICATION, UNSPECIFIED WHETHER LONG TERM INSULIN USE: ICD-10-CM

## 2023-05-15 DIAGNOSIS — F41.9 ANXIETY DISORDER, UNSPECIFIED TYPE: ICD-10-CM

## 2023-05-15 DIAGNOSIS — I10 ESSENTIAL HYPERTENSION: ICD-10-CM

## 2023-05-15 DIAGNOSIS — M19.90 ARTHRITIS: ICD-10-CM

## 2023-05-15 DIAGNOSIS — E78.1 HYPERTRIGLYCERIDEMIA: ICD-10-CM

## 2023-05-15 DIAGNOSIS — F32.9 MAJOR DEPRESSIVE DISORDER, REMISSION STATUS UNSPECIFIED, UNSPECIFIED WHETHER RECURRENT: ICD-10-CM

## 2023-05-15 DIAGNOSIS — Z12.31 ENCOUNTER FOR SCREENING MAMMOGRAM FOR MALIGNANT NEOPLASM OF BREAST: Primary | ICD-10-CM

## 2023-05-15 RX ORDER — PRAVASTATIN SODIUM 20 MG
20 TABLET ORAL DAILY
Qty: 90 TABLET | Refills: 3 | Status: SHIPPED | OUTPATIENT
Start: 2023-05-15

## 2023-05-15 RX ORDER — FLUOXETINE HYDROCHLORIDE 40 MG/1
40 CAPSULE ORAL EVERY MORNING
Qty: 90 CAPSULE | Refills: 1 | Status: SHIPPED | OUTPATIENT
Start: 2023-05-15

## 2023-05-15 RX ORDER — LOSARTAN POTASSIUM AND HYDROCHLOROTHIAZIDE 25; 100 MG/1; MG/1
1 TABLET ORAL DAILY
Qty: 90 TABLET | Refills: 1 | Status: SHIPPED | OUTPATIENT
Start: 2023-05-15

## 2023-05-15 RX ORDER — BUPROPION HYDROCHLORIDE 150 MG/1
150 TABLET ORAL DAILY
Qty: 90 TABLET | Refills: 1 | Status: SHIPPED | OUTPATIENT
Start: 2023-05-15

## 2023-05-15 RX ORDER — DICLOFENAC SODIUM 75 MG/1
75 TABLET, DELAYED RELEASE ORAL 2 TIMES DAILY
Qty: 180 TABLET | Refills: 1 | Status: SHIPPED | OUTPATIENT
Start: 2023-05-15

## 2023-05-15 RX ORDER — ASPIRIN 81 MG/1
81 TABLET ORAL DAILY
Qty: 90 TABLET | Refills: 1 | Status: SHIPPED | OUTPATIENT
Start: 2023-05-15

## 2023-05-15 NOTE — PROGRESS NOTES
Chief Complaint  Diabetes, Anxiety, Anemia, Depression, Hypertension, and Hyperlipidemia    Subjective            Adelita Garcia presents to [facility]  History of Present Illness  Pt is a 6 mo f/u for DM2, HTN, HLD, anemia, and depression w/ anxiety. No issues or concerns at this time.     Pt is due A1C.    Pt is due mammogram.     Pt is due DM eye exam. PT was given form to self schedule.     MAW: scheduled 23        Past Medical History:   Diagnosis Date   • Abnormal bone density screening 10/17/2019    NORMAL   • Anemia    • Anxiety disorder    • Arthritis 2015   • Cataract     Removed   • Diabetes mellitus     TYPE 2   • Diabetic eye exam 2019    SCANNED IN CHART   • Essential hypertension    • Eye exam, routine 2019    YEARLY EYE EXAM   • Hyperlipidemia    • Hypertriglyceridemia 09/15/2017   • Insomnia 2015   • Joint pain    • Major depressive disorder 2018   • Pap smear for cervical cancer screening     NO LONGER   • Visit for screening mammogram 01/15/2020    NORMAL   • Vitamin D deficiency 2018       No Known Allergies     Past Surgical History:   Procedure Laterality Date   • BREAST SURGERY      Benign breast biopsies   •  SECTION  ,   • COLONOSCOPY  2012    -NORMAL REPEAT IN 10 YEARS    • DILATION AND CURETTAGE, DIAGNOSTIC / THERAPEUTIC  ,   • EYE SURGERY     • HYSTERECTOMY     • JOINT REPLACEMENT     • TUBAL ABDOMINAL LIGATION          Social History     Tobacco Use   • Smoking status: Never   • Smokeless tobacco: Never   Substance Use Topics   • Alcohol use: Never       Family History   Problem Relation Age of Onset   • Stroke Mother    • Heart disease Mother         Bypass age 83.  11- age 96   • Hypertension Mother    • Heart disease Father         Abdominal aneurysm.  after surgery age 69   • Hypertension Father    • Heart disease Brother         Heart attacks  "age 45. Stints. Current age 74   • Arthritis Brother    • Heart disease Brother         Bypass age 72. Current age 79        Current Outpatient Medications on File Prior to Visit   Medication Sig   • baclofen (LIORESAL) 10 MG tablet Take 1 tablet by mouth Daily.   • Cholecalciferol 50 MCG (2000 UT) tablet Vitamin D3 2,000 unit oral tablet take 1 tablet by oral route daily   Active   • FeroSul 325 (65 Fe) MG tablet Take 1 tablet by mouth 2 (Two) Times a Day.   • [DISCONTINUED] aspirin 81 MG EC tablet Take 1 tablet by mouth Daily.   • [DISCONTINUED] buPROPion XL (WELLBUTRIN XL) 150 MG 24 hr tablet TAKE 1 TABLET DAILY   • [DISCONTINUED] diclofenac (VOLTAREN) 75 MG EC tablet TAKE 1 TABLET TWICE A DAY   • [DISCONTINUED] FLUoxetine (PROzac) 40 MG capsule TAKE 1 CAPSULE EVERY MORNING   • [DISCONTINUED] losartan-hydrochlorothiazide (HYZAAR) 100-25 MG per tablet TAKE 1 TABLET DAILY   • [DISCONTINUED] metFORMIN (GLUCOPHAGE) 1000 MG tablet Take 1 tablet by mouth 2 (Two) Times a Day With Meals.   • [DISCONTINUED] pravastatin (PRAVACHOL) 20 MG tablet TAKE 1 TABLET DAILY     No current facility-administered medications on file prior to visit.       Health Maintenance Due   Topic Date Due   • DIABETIC EYE EXAM  05/22/2021   • COLORECTAL CANCER SCREENING  07/06/2022       Objective     /57   Pulse 90   Ht 160 cm (63\")   Wt 96.6 kg (213 lb)   SpO2 96%   BMI 37.73 kg/m²       Physical Exam  Constitutional:       General: She is not in acute distress.     Appearance: Normal appearance. She is not ill-appearing.   HENT:      Head: Normocephalic and atraumatic.      Right Ear: Tympanic membrane, ear canal and external ear normal.      Left Ear: Tympanic membrane, ear canal and external ear normal.      Nose: Nose normal.   Cardiovascular:      Rate and Rhythm: Normal rate and regular rhythm.      Heart sounds: Normal heart sounds. No murmur heard.  Pulmonary:      Effort: Pulmonary effort is normal. No respiratory distress. "      Breath sounds: Normal breath sounds.   Chest:      Chest wall: No tenderness.   Abdominal:      General: Abdomen is flat. Bowel sounds are normal. There is no distension.      Palpations: Abdomen is soft. There is no mass.      Tenderness: There is no abdominal tenderness. There is no guarding.   Musculoskeletal:         General: No swelling or tenderness. Normal range of motion.      Cervical back: Normal range of motion and neck supple.   Skin:     General: Skin is warm and dry.      Findings: No rash.   Neurological:      General: No focal deficit present.      Mental Status: She is alert and oriented to person, place, and time. Mental status is at baseline.      Gait: Gait normal.   Psychiatric:         Mood and Affect: Mood normal.         Behavior: Behavior normal.         Thought Content: Thought content normal.         Judgment: Judgment normal.     Class 2 Severe Obesity (BMI >=35 and <=39.9). Obesity-related health conditions include the following: hypertension and diabetes mellitus. Obesity is unchanged. BMI is is above average; BMI management plan is completed. We discussed portion control, increasing exercise and management of depression/anxiety/stress to control compensatory eating.      Result Review :                           Assessment and Plan        Diagnoses and all orders for this visit:    1. Encounter for screening mammogram for malignant neoplasm of breast (Primary)  -     Mammo Screening Digital Tomosynthesis Bilateral With CAD; Future    2. Anxiety disorder, unspecified type  Comments:  stable on wellbutrin 150mg  and prozac 40mg, continue  Orders:  -     buPROPion XL (WELLBUTRIN XL) 150 MG 24 hr tablet; Take 1 tablet by mouth Daily.  Dispense: 90 tablet; Refill: 1    3. Arthritis  Comments:  stable on votaren, continue  Orders:  -     diclofenac (VOLTAREN) 75 MG EC tablet; Take 1 tablet by mouth 2 (Two) Times a Day.  Dispense: 180 tablet; Refill: 1    4. Major depressive disorder,  remission status unspecified, unspecified whether recurrent  Comments:  stable on prozac 40mg and wellbutrin 150mg continue  Orders:  -     FLUoxetine (PROzac) 40 MG capsule; Take 1 capsule by mouth Every Morning.  Dispense: 90 capsule; Refill: 1    5. Essential hypertension  Comments:  stable on hyzaar 100/25mg  Orders:  -     aspirin 81 MG EC tablet; Take 1 tablet by mouth Daily.  Dispense: 90 tablet; Refill: 1  -     losartan-hydrochlorothiazide (HYZAAR) 100-25 MG per tablet; Take 1 tablet by mouth Daily.  Dispense: 90 tablet; Refill: 1    6. Type 2 diabetes mellitus with other specified complication, unspecified whether long term insulin use  Comments:  stable on metformin 1000mg, continue  Orders:  -     Hemoglobin A1c; Future  -     metFORMIN (GLUCOPHAGE) 1000 MG tablet; Take 1 tablet by mouth 2 (Two) Times a Day With Meals.  Dispense: 180 tablet; Refill: 1    7. Hypertriglyceridemia  Comments:  stable on pravochol 20mg, continue  Orders:  -     pravastatin (PRAVACHOL) 20 MG tablet; Take 1 tablet by mouth Daily.  Dispense: 90 tablet; Refill: 3              Follow Up     Return in about 6 months (around 11/15/2023).    Patient was given instructions and counseling regarding her condition or for health maintenance advice. Please see specific information pulled into the AVS if appropriate.     Adelita Garcia  reports that she has never smoked. She has never used smokeless tobacco..

## 2023-05-25 ENCOUNTER — CLINICAL SUPPORT (OUTPATIENT)
Dept: GASTROENTEROLOGY | Facility: CLINIC | Age: 68
End: 2023-05-25
Payer: MEDICARE

## 2023-05-25 ENCOUNTER — LAB (OUTPATIENT)
Dept: LAB | Facility: HOSPITAL | Age: 68
End: 2023-05-25
Payer: MEDICARE

## 2023-05-25 ENCOUNTER — PREP FOR SURGERY (OUTPATIENT)
Dept: OTHER | Facility: HOSPITAL | Age: 68
End: 2023-05-25
Payer: MEDICARE

## 2023-05-25 DIAGNOSIS — Z12.11 ENCOUNTER FOR SCREENING FOR MALIGNANT NEOPLASM OF COLON: Primary | ICD-10-CM

## 2023-05-25 DIAGNOSIS — E11.69 TYPE 2 DIABETES MELLITUS WITH OTHER SPECIFIED COMPLICATION, UNSPECIFIED WHETHER LONG TERM INSULIN USE: ICD-10-CM

## 2023-05-25 LAB — HBA1C MFR BLD: 6.3 % (ref 4.8–5.6)

## 2023-05-25 PROCEDURE — 83036 HEMOGLOBIN GLYCOSYLATED A1C: CPT

## 2023-05-25 PROCEDURE — 36415 COLL VENOUS BLD VENIPUNCTURE: CPT

## 2023-05-25 NOTE — PROGRESS NOTES
Adelita Garcia  1955  67 y.o.    Reason for call: Recall- 10 yr colon, last scope   Prep prescribed: Clenpiq  Prep instructions reviewed with patient and sent to patient via [place]  Clearance needed? No  If yes, what clearance is needed? N/A  Clearance has been requested from N/A  The patient has been scheduled for: Colonoscopy  Family history of colon cancer? No  If yes, indicate relative: N/a  Family History   Problem Relation Age of Onset   • Stroke Mother    • Heart disease Mother         Bypass age 83.   age 96   • Hypertension Mother    • Heart disease Father         Abdominal aneurysm.  after surgery age 69   • Hypertension Father    • Heart disease Brother         Heart attacks age 45. [place]. Current age 74   • Arthritis Brother    • Heart disease Brother         Bypass age 72. Current age 79   • Colon cancer Neg Hx      Past Medical History:   Diagnosis Date   • Abnormal bone density screening 10/17/2019    NORMAL   • Anemia    • Anxiety disorder    • Arthritis 2015   • Cataract     Removed   • Diabetes mellitus     TYPE 2   • Diabetic eye exam 2019    SCANNED IN CHART   • Essential hypertension    • Eye exam, routine 2019    YEARLY EYE EXAM   • Hyperlipidemia    • Hypertriglyceridemia 09/15/2017   • Insomnia 2015   • Joint pain    • Major depressive disorder 2018   • Pap smear for cervical cancer screening     NO LONGER   • Visit for screening mammogram 01/15/2020    NORMAL   • Vitamin D deficiency 2018     No Known Allergies  Past Surgical History:   Procedure Laterality Date   • BREAST SURGERY      Benign breast biopsies   •  SECTION  ,   • COLONOSCOPY  2012    -NORMAL REPEAT IN 10 YEARS    • DILATION AND CURETTAGE, DIAGNOSTIC / THERAPEUTIC  ,   • EYE SURGERY     • HYSTERECTOMY     • JOINT REPLACEMENT     • TUBAL ABDOMINAL LIGATION       Social History     Socioeconomic History   •  Marital status:    Tobacco Use   • Smoking status: Never     Passive exposure: Never   • Smokeless tobacco: Never   Vaping Use   • Vaping Use: Never used   Substance and Sexual Activity   • Alcohol use: Never   • Drug use: Never   • Sexual activity: Not Currently     Partners: Male     Birth control/protection: Surgical       Current Outpatient Medications:   •  aspirin 81 MG EC tablet, Take 1 tablet by mouth Daily., Disp: 90 tablet, Rfl: 1  •  baclofen (LIORESAL) 10 MG tablet, Take 1 tablet by mouth Daily., Disp: 90 tablet, Rfl: 1  •  buPROPion XL (WELLBUTRIN XL) 150 MG 24 hr tablet, Take 1 tablet by mouth Daily., Disp: 90 tablet, Rfl: 1  •  Cholecalciferol 50 MCG (2000 UT) tablet, Vitamin D3 2,000 unit oral tablet take 1 tablet by oral route daily   Active, Disp: , Rfl:   •  diclofenac (VOLTAREN) 75 MG EC tablet, Take 1 tablet by mouth 2 (Two) Times a Day., Disp: 180 tablet, Rfl: 1  •  FeroSul 325 (65 Fe) MG tablet, Take 1 tablet by mouth 2 (Two) Times a Day., Disp: 180 tablet, Rfl: 1  •  FLUoxetine (PROzac) 40 MG capsule, Take 1 capsule by mouth Every Morning., Disp: 90 capsule, Rfl: 1  •  losartan-hydrochlorothiazide (HYZAAR) 100-25 MG per tablet, Take 1 tablet by mouth Daily., Disp: 90 tablet, Rfl: 1  •  metFORMIN (GLUCOPHAGE) 1000 MG tablet, Take 1 tablet by mouth 2 (Two) Times a Day With Meals., Disp: 180 tablet, Rfl: 1  •  pravastatin (PRAVACHOL) 20 MG tablet, Take 1 tablet by mouth Daily., Disp: 90 tablet, Rfl: 3  Answers for HPI/ROS submitted by the patient on 5/18/2023  What is the primary reason for your visit?: Physical

## 2023-06-16 DIAGNOSIS — M25.50 ARTHRALGIA, UNSPECIFIED JOINT: ICD-10-CM

## 2023-06-19 ENCOUNTER — TELEMEDICINE (OUTPATIENT)
Dept: FAMILY MEDICINE CLINIC | Facility: CLINIC | Age: 68
End: 2023-06-19
Payer: MEDICARE

## 2023-06-19 DIAGNOSIS — Z00.00 MEDICARE ANNUAL WELLNESS VISIT, SUBSEQUENT: Primary | ICD-10-CM

## 2023-06-19 PROCEDURE — G0439 PPPS, SUBSEQ VISIT: HCPCS | Performed by: NURSE PRACTITIONER

## 2023-06-19 PROCEDURE — 1170F FXNL STATUS ASSESSED: CPT | Performed by: NURSE PRACTITIONER

## 2023-06-19 PROCEDURE — 3044F HG A1C LEVEL LT 7.0%: CPT | Performed by: NURSE PRACTITIONER

## 2023-06-19 RX ORDER — BACLOFEN 10 MG/1
TABLET ORAL
Qty: 90 TABLET | Refills: 1 | Status: SHIPPED | OUTPATIENT
Start: 2023-06-19

## 2023-06-19 NOTE — PROGRESS NOTES
You have chosen to receive care through a telehealth visit.  Do you consent to use a video/audio connection for your medical care today? Yes    Mode of visit: video    Location of patient: [facility]    Location of provider: [facility]       The ABCs of the Annual Wellness Visit  Subsequent Medicare Wellness Visit    Subjective      Adelita Garcia is a 67 y.o. female who presents for a Subsequent Medicare Wellness Visit.    The following portions of the patient's history were reviewed and   updated as appropriate: allergies, current medications, past family history, past medical history, past social history, past surgical history, and problem list.    Compared to one year ago, the patient feels her physical   health is the same.    Compared to one year ago, the patient feels her mental   health is the same.    Recent Hospitalizations:  She was not admitted to the hospital during the last year.       Current Medical Providers:  Patient Care Team:  Nesha Gomez APRN as PCP - General (Nurse Practitioner)    Outpatient Medications Prior to Visit   Medication Sig Dispense Refill   • aspirin 81 MG EC tablet Take 1 tablet by mouth Daily. 90 tablet 1   • baclofen (LIORESAL) 10 MG tablet TAKE 1 TABLET DAILY 90 tablet 1   • buPROPion XL (WELLBUTRIN XL) 150 MG 24 hr tablet Take 1 tablet by mouth Daily. 90 tablet 1   • Cholecalciferol 50 MCG (2000 UT) tablet Vitamin D3 2,000 unit oral tablet take 1 tablet by oral route daily   Active     • diclofenac (VOLTAREN) 75 MG EC tablet Take 1 tablet by mouth 2 (Two) Times a Day. 180 tablet 1   • FeroSul 325 (65 Fe) MG tablet Take 1 tablet by mouth 2 (Two) Times a Day. 180 tablet 1   • FLUoxetine (PROzac) 40 MG capsule Take 1 capsule by mouth Every Morning. 90 capsule 1   • losartan-hydrochlorothiazide (HYZAAR) 100-25 MG per tablet Take 1 tablet by mouth Daily. 90 tablet 1   • metFORMIN (GLUCOPHAGE) 1000 MG tablet Take 1 tablet by mouth 2 (Two) Times a Day With Meals.  "180 tablet 1   • pravastatin (PRAVACHOL) 20 MG tablet Take 1 tablet by mouth Daily. 90 tablet 3   • Sod Picosulfate-Mag Ox-Cit Acd 10-3.5-12 MG-GM -GM/175ML solution Take 175 mL by mouth Take As Directed. 350 mL 0     No facility-administered medications prior to visit.       No opioid medication identified on active medication list. I have reviewed chart for other potential  high risk medication/s and harmful drug interactions in the elderly.          Aspirin is on active medication list. Aspirin use is indicated based on review of current medical condition/s. Pros and cons of this therapy have been discussed today. Benefits of this medication outweigh potential harm.  Patient has been encouraged to continue taking this medication.  .      Patient Active Problem List   Diagnosis   • Anemia   • Anxiety disorder   • Arthritis   • Essential hypertension   • History of total knee replacement   • Hyperlipidemia   • Hypertriglyceridemia   • Insomnia   • Joint pain   • Major depressive disorder   • Vitamin D deficiency   • Osteoarthritis   • Type 2 diabetes mellitus without complication   • Encounter for screening for malignant neoplasm of colon     Advance Care Planning   Advance Care Planning     Advance Directive is not on file.  ACP discussion was held with the patient during this visit. Patient has an advance directive (not in EMR), copy requested.     Objective    Vitals:    06/19/23 1132   PainSc: 0-No pain     Estimated body mass index is 37.73 kg/m² as calculated from the following:    Height as of 5/15/23: 160 cm (63\").    Weight as of 5/15/23: 96.6 kg (213 lb).           Does the patient have evidence of cognitive impairment?   No    Lab Results   Component Value Date    HGBA1C 6.30 (H) 05/25/2023          HEALTH RISK ASSESSMENT    Smoking Status:  Social History     Tobacco Use   Smoking Status Never   • Passive exposure: Never   Smokeless Tobacco Never     Alcohol Consumption:  Social History     Substance " and Sexual Activity   Alcohol Use Never     Fall Risk Screen:    PÉREZ Fall Risk Assessment was completed, and patient is at LOW risk for falls.Assessment completed on:2023    Depression Screenin/19/2023    11:00 AM   PHQ-2/PHQ-9 Depression Screening   Little Interest or Pleasure in Doing Things 0-->not at all   Feeling Down, Depressed or Hopeless 0-->not at all   PHQ-9: Brief Depression Severity Measure Score 0       Health Habits and Functional and Cognitive Screenin/19/2023    11:00 AM   Functional & Cognitive Status   Do you have difficulty preparing food and eating? No   Do you have difficulty bathing yourself, getting dressed or grooming yourself? No   Do you have difficulty using the toilet? No   Do you have difficulty moving around from place to place? No   Do you have trouble with steps or getting out of a bed or a chair? No   Current Diet Well Balanced Diet   Dental Exam Up to date   Eye Exam Up to date   Exercise (times per week) 7 times per week   Current Exercises Include Walking;House Cleaning   Do you need help using the phone?  No   Are you deaf or do you have serious difficulty hearing?  No   Do you need help with transportation? No   Do you need help shopping? No   Do you need help preparing meals?  No   Do you need help with housework?  No   Do you need help with laundry? No   Do you need help taking your medications? No   Do you need help managing money? No   Do you ever drive or ride in a car without wearing a seat belt? No   Have you felt unusual stress, anger or loneliness in the last month? No   Who do you live with? Spouse   If you need help, do you have trouble finding someone available to you? No   Have you been bothered in the last four weeks by sexual problems? No   Do you have difficulty concentrating, remembering or making decisions? No       Age-appropriate Screening Schedule:  Refer to the list below for future screening recommendations based on patient's age,  sex and/or medical conditions. Orders for these recommended tests are listed in the plan section. The patient has been provided with a written plan.    Health Maintenance   Topic Date Due   • COLORECTAL CANCER SCREENING  06/19/2023 (Originally 1955)   • DIABETIC EYE EXAM  08/07/2023 (Originally 5/22/2021)   • COVID-19 Vaccine (6 - Moderna series) 06/19/2024 (Originally 3/1/2023)   • INFLUENZA VACCINE  10/01/2023   • DIABETIC FOOT EXAM  10/27/2023   • HEMOGLOBIN A1C  11/25/2023   • LIPID PANEL  12/06/2023   • URINE MICROALBUMIN  12/06/2023   • ANNUAL WELLNESS VISIT  06/19/2024   • MAMMOGRAM  07/27/2024   • DXA SCAN  07/27/2024   • TDAP/TD VACCINES (2 - Td or Tdap) 05/09/2027   • HEPATITIS C SCREENING  Completed   • Pneumococcal Vaccine 65+  Completed   • ZOSTER VACCINE  Completed                  CMS Preventative Services Quick Reference  Risk Factors Identified During Encounter:    None Identified    The above risks/problems have been discussed with the patient.  Pertinent information has been shared with the patient in the After Visit Summary.    Diagnoses and all orders for this visit:    1. Medicare annual wellness visit, subsequent (Primary)        Follow Up:   Next Medicare Wellness visit to be scheduled in 1 year.      An After Visit Summary and PPPS were made available to the patient.

## 2023-08-07 NOTE — PRE-PROCEDURE INSTRUCTIONS
"Instructed on date and arrival time of 060. Come to entrance \"C\". Must have  over age 18 to drive home.  May have two visitors; however, children under 12 must stay in waiting room.  Discussed clear liquid diet (no red or purple) and bowel prep.  May take medications as usual except for blood thinners, diabetic medications, and weight loss medications.  Bring list of medications.  Verbalized understanding of instructions given.  Phone number given for questions or concerns.  "

## 2023-08-15 ENCOUNTER — HOSPITAL ENCOUNTER (OUTPATIENT)
Facility: HOSPITAL | Age: 68
Setting detail: HOSPITAL OUTPATIENT SURGERY
Discharge: HOME | End: 2023-08-15
Attending: INTERNAL MEDICINE | Admitting: INTERNAL MEDICINE
Payer: MEDICARE

## 2023-08-15 ENCOUNTER — ANESTHESIA (OUTPATIENT)
Dept: GASTROENTEROLOGY | Facility: HOSPITAL | Age: 68
End: 2023-08-15
Payer: MEDICARE

## 2023-08-15 ENCOUNTER — ANESTHESIA EVENT (OUTPATIENT)
Dept: GASTROENTEROLOGY | Facility: HOSPITAL | Age: 68
End: 2023-08-15
Payer: MEDICARE

## 2023-08-15 VITALS
SYSTOLIC BLOOD PRESSURE: 104 MMHG | TEMPERATURE: 98.7 F | HEART RATE: 89 BPM | WEIGHT: 207.23 LBS | RESPIRATION RATE: 15 BRPM | DIASTOLIC BLOOD PRESSURE: 69 MMHG | BODY MASS INDEX: 36.71 KG/M2 | OXYGEN SATURATION: 93 %

## 2023-08-15 LAB — GLUCOSE BLDC GLUCOMTR-MCNC: 128 MG/DL (ref 70–99)

## 2023-08-15 PROCEDURE — 25010000002 PROPOFOL 10 MG/ML EMULSION: Performed by: NURSE ANESTHETIST, CERTIFIED REGISTERED

## 2023-08-15 PROCEDURE — G0121 COLON CA SCRN NOT HI RSK IND: HCPCS | Performed by: INTERNAL MEDICINE

## 2023-08-15 PROCEDURE — 82948 REAGENT STRIP/BLOOD GLUCOSE: CPT

## 2023-08-15 RX ORDER — SODIUM CHLORIDE, SODIUM LACTATE, POTASSIUM CHLORIDE, CALCIUM CHLORIDE 600; 310; 30; 20 MG/100ML; MG/100ML; MG/100ML; MG/100ML
1000 INJECTION, SOLUTION INTRAVENOUS CONTINUOUS
Status: DISCONTINUED | OUTPATIENT
Start: 2023-08-15 | End: 2023-08-15 | Stop reason: HOSPADM

## 2023-08-15 RX ORDER — LIDOCAINE HYDROCHLORIDE 20 MG/ML
INJECTION, SOLUTION EPIDURAL; INFILTRATION; INTRACAUDAL; PERINEURAL AS NEEDED
Status: DISCONTINUED | OUTPATIENT
Start: 2023-08-15 | End: 2023-08-15 | Stop reason: SURG

## 2023-08-15 RX ORDER — SODIUM CHLORIDE, SODIUM LACTATE, POTASSIUM CHLORIDE, CALCIUM CHLORIDE 600; 310; 30; 20 MG/100ML; MG/100ML; MG/100ML; MG/100ML
30 INJECTION, SOLUTION INTRAVENOUS CONTINUOUS
Status: DISCONTINUED | OUTPATIENT
Start: 2023-08-15 | End: 2023-08-15 | Stop reason: HOSPADM

## 2023-08-15 RX ORDER — PROPOFOL 10 MG/ML
VIAL (ML) INTRAVENOUS AS NEEDED
Status: DISCONTINUED | OUTPATIENT
Start: 2023-08-15 | End: 2023-08-15 | Stop reason: SURG

## 2023-08-15 RX ADMIN — PROPOFOL 50 MG: 10 INJECTION, EMULSION INTRAVENOUS at 07:33

## 2023-08-15 RX ADMIN — SODIUM CHLORIDE, POTASSIUM CHLORIDE, SODIUM LACTATE AND CALCIUM CHLORIDE 1000 ML: 600; 310; 30; 20 INJECTION, SOLUTION INTRAVENOUS at 06:28

## 2023-08-15 RX ADMIN — LIDOCAINE HYDROCHLORIDE 40 MG: 20 INJECTION, SOLUTION EPIDURAL; INFILTRATION; INTRACAUDAL; PERINEURAL at 07:29

## 2023-08-15 RX ADMIN — PROPOFOL 100 MG: 10 INJECTION, EMULSION INTRAVENOUS at 07:29

## 2023-08-15 RX ADMIN — PROPOFOL 250 MCG/KG/MIN: 10 INJECTION, EMULSION INTRAVENOUS at 07:29

## 2023-08-15 NOTE — ANESTHESIA POSTPROCEDURE EVALUATION
Patient: Adelita Garcia    Procedure Summary       Date: 08/15/23 Room / Location: [facility] ENDOSCOPY 2 / [facility] ENDOSCOPY    Anesthesia Start: 0727 Anesthesia Stop: 0751    Procedure: COLONOSCOPY Diagnosis:       Encounter for screening for malignant neoplasm of colon      (Encounter for screening for malignant neoplasm of colon [Z12.11])    Surgeons: Hamilton Harris MD Provider: Angel Ridley MD    Anesthesia Type: general ASA Status: 3            Anesthesia Type: general    Vitals  Vitals Value Taken Time   /69 08/15/23 0805   Temp 37.1 øC (98.7 øF) 08/15/23 0805   Pulse 86 08/15/23 0809   Resp 15 08/15/23 0805   SpO2 95 % 08/15/23 0809   Vitals shown include unvalidated device data.        Post Anesthesia Care and Evaluation    Patient location during evaluation: bedside  Patient participation: complete - patient participated  Level of consciousness: awake  Pain management: adequate    Airway patency: patent  PONV Status: none  Cardiovascular status: acceptable  Respiratory status: acceptable  Hydration status: acceptable    Comments: An Anesthesiologist personally participated in the most demanding procedures (including induction and emergence if applicable) in the anesthesia plan, monitored the course of anesthesia administration at frequent intervals and remained physically present and available for immediate diagnosis and treatment of emergencies.

## 2023-08-15 NOTE — H&P
ScreeningPre Procedure History & Physical    Chief Complaint:   Screening     Subjective     HPI:   Screening     Past Medical History:   Past Medical History:   Diagnosis Date    Abnormal bone density screening 10/17/2019    NORMAL    Anemia     Anxiety disorder     Arthritis 2015    Cataract     Removed    Diabetes mellitus     TYPE 2    Diabetic eye exam 2019    SCANNED IN CHART    Essential hypertension     Eye exam, routine 2019    YEARLY EYE EXAM    Hyperlipidemia     Hypertriglyceridemia 09/15/2017    Insomnia 2015    Joint pain     Major depressive disorder 2018    Pap smear for cervical cancer screening     NO LONGER    Visit for screening mammogram 01/15/2020    NORMAL    Vitamin D deficiency 2018       Past Surgical History:  Past Surgical History:   Procedure Laterality Date    BREAST SURGERY      Benign breast biopsies     SECTION  ,    COLONOSCOPY  2012    -NORMAL REPEAT IN 10 YEARS     DILATION AND CURETTAGE, DIAGNOSTIC / THERAPEUTIC  ,    EYE SURGERY      HYSTERECTOMY      JOINT REPLACEMENT      TUBAL ABDOMINAL LIGATION         Family History:  Family History   Problem Relation Age of Onset    Stroke Mother     Heart disease Mother         Bypass age 83.  - age 96    Hypertension Mother     Heart disease Father         Abdominal aneurysm.  after surgery age 69    Hypertension Father     Heart disease Brother         Heart attacks mid 40's.    Arthritis Brother     Heart disease Brother         Cardiac bypass age 70. Current age 79.    Colon cancer Neg Hx        Social History:   reports that she has never smoked. She has never been exposed to tobacco smoke. She has never used smokeless tobacco. She reports that she does not drink alcohol and does not use drugs.    Medications:   Medications Prior to Admission   Medication Sig Dispense Refill Last Dose    aspirin 81 MG EC tablet Take 1 tablet by  mouth Daily. 90 tablet 1 Past Week    baclofen (LIORESAL) 10 MG tablet TAKE 1 TABLET DAILY 90 tablet 1 Past Week    buPROPion XL (WELLBUTRIN XL) 150 MG 24 hr tablet Take 1 tablet by mouth Daily. 90 tablet 1 Past Week    Cholecalciferol 50 MCG (2000 UT) tablet Vitamin D3 2,000 unit oral tablet take 1 tablet by oral route daily   Active   Past Week    diclofenac (VOLTAREN) 75 MG EC tablet Take 1 tablet by mouth 2 (Two) Times a Day. 180 tablet 1 Past Week    FeroSul 325 (65 Fe) MG tablet Take 1 tablet by mouth 2 (Two) Times a Day. 180 tablet 1 Past Week    FLUoxetine (PROzac) 40 MG capsule Take 1 capsule by mouth Every Morning. 90 capsule 1 8/14/2023    losartan-hydrochlorothiazide (HYZAAR) 100-25 MG per tablet Take 1 tablet by mouth Daily. 90 tablet 1 8/14/2023    metFORMIN (GLUCOPHAGE) 1000 MG tablet Take 1 tablet by mouth 2 (Two) Times a Day With Meals. 180 tablet 1 Past Week    pravastatin (PRAVACHOL) 20 MG tablet Take 1 tablet by mouth Daily. 90 tablet 3 Past Week       Allergies:  Patient has no known allergies.        Objective     Blood pressure 162/62, pulse 87, temperature 96.7 øF (35.9 øC), temperature source Temporal, resp. rate 16, weight 94 kg (207 lb 3.7 oz), SpO2 94 %.    Physical Exam   Constitutional: Pt is oriented to person, place, and time and well-developed, well-nourished, and in no distress.   Mouth/Throat: Oropharynx is clear and moist.   Neck: Normal range of motion.   Cardiovascular: Normal rate, regular rhythm and normal heart sounds.    Pulmonary/Chest: Effort normal and breath sounds normal.   Abdominal: Soft. Nontender  Skin: Skin is warm and dry.   Psychiatric: Mood, memory, affect and judgment normal.     Assessment & Plan     Diagnosis:  Screening colonoscopy       Anticipated Surgical Procedure:  colonoscopy    The risks, benefits, and alternatives of this procedure have been discussed with the patient or the responsible party- the patient understands and agrees to  proceed.

## 2023-08-15 NOTE — ANESTHESIA PREPROCEDURE EVALUATION
Anesthesia Evaluation     Patient summary reviewed and Nursing notes reviewed                Airway   Mallampati: I  TM distance: >3 FB  Neck ROM: full  No difficulty expected  Dental      Pulmonary - negative pulmonary ROS and normal exam    breath sounds clear to auscultation  Cardiovascular - normal exam    Rhythm: regular  Rate: normal    (+) hypertensionhyperlipidemia      Neuro/Psych  (+) psychiatric history Anxiety  GI/Hepatic/Renal/Endo    (+) obesity, diabetes mellitus    Musculoskeletal     Abdominal    Substance History - negative use     OB/GYN negative ob/gyn ROS         Other   arthritis,                   Anesthesia Plan    ASA 3     general     intravenous induction     Anesthetic plan, risks, benefits, and alternatives have been provided, discussed and informed consent has been obtained with: patient.    CODE STATUS:

## 2023-08-28 ENCOUNTER — HOSPITAL ENCOUNTER (OUTPATIENT)
Dept: MAMMOGRAPHY | Facility: HOSPITAL | Age: 68
Discharge: HOME | End: 2023-08-28
Admitting: NURSE PRACTITIONER
Payer: MEDICARE

## 2023-08-28 DIAGNOSIS — Z12.31 ENCOUNTER FOR SCREENING MAMMOGRAM FOR MALIGNANT NEOPLASM OF BREAST: ICD-10-CM

## 2023-08-28 PROCEDURE — 77067 SCR MAMMO BI INCL CAD: CPT

## 2023-08-28 PROCEDURE — 77063 BREAST TOMOSYNTHESIS BI: CPT

## 2023-11-15 ENCOUNTER — OFFICE VISIT (OUTPATIENT)
Dept: FAMILY MEDICINE CLINIC | Facility: CLINIC | Age: 68
End: 2023-11-15
Payer: MEDICARE

## 2023-11-15 VITALS
SYSTOLIC BLOOD PRESSURE: 134 MMHG | WEIGHT: 212 LBS | BODY MASS INDEX: 37.56 KG/M2 | DIASTOLIC BLOOD PRESSURE: 54 MMHG | HEIGHT: 63 IN | OXYGEN SATURATION: 97 % | HEART RATE: 86 BPM

## 2023-11-15 DIAGNOSIS — E78.5 HYPERLIPIDEMIA, UNSPECIFIED HYPERLIPIDEMIA TYPE: ICD-10-CM

## 2023-11-15 DIAGNOSIS — E55.9 VITAMIN D DEFICIENCY: ICD-10-CM

## 2023-11-15 DIAGNOSIS — D64.9 ANEMIA, UNSPECIFIED TYPE: ICD-10-CM

## 2023-11-15 DIAGNOSIS — I10 PRIMARY HYPERTENSION: Primary | ICD-10-CM

## 2023-11-15 DIAGNOSIS — Z13.29 SCREENING FOR THYROID DISORDER: ICD-10-CM

## 2023-11-15 DIAGNOSIS — E11.9 TYPE 2 DIABETES MELLITUS WITHOUT COMPLICATION, WITHOUT LONG-TERM CURRENT USE OF INSULIN: ICD-10-CM

## 2023-11-15 DIAGNOSIS — F32.A DEPRESSION, UNSPECIFIED DEPRESSION TYPE: ICD-10-CM

## 2023-11-15 DIAGNOSIS — M19.90 ARTHRITIS: ICD-10-CM

## 2023-11-15 NOTE — PROGRESS NOTES
Chief Complaint  Anxiety, Anemia, Depression, Diabetes, Hyperlipidemia, and Hypertension    Subjective            Adelita Garcia presents to [facility]  History of Present Illness  Pt is a f/u for anxiety, arthritis, anemia, depression, DM2, HTN, and HLD. Pt has c/o hand and feet pain at times.  Pt states sometimes her fingers will get cold. Pt was given Volteran gel at  on 23 and states this helps.    Pt is due labs.    DM eye exam 23    Pt is due DM foot exam.     MAW: scheduled 24        Past Medical History:   Diagnosis Date    Abnormal bone density screening 10/17/2019    NORMAL    Anemia     Anxiety disorder     Arthritis 2015    Cataract     Removed    Diabetes mellitus     TYPE 2    Diabetic eye exam 2019    SCANNED IN CHART    Essential hypertension     Eye exam, routine 2019    YEARLY EYE EXAM    Hyperlipidemia     Hypertriglyceridemia 09/15/2017    Insomnia 2015    Joint pain     Major depressive disorder 2018    Pap smear for cervical cancer screening     NO LONGER    Visit for screening mammogram 01/15/2020    NORMAL    Vitamin D deficiency 2018       No Known Allergies     Past Surgical History:   Procedure Laterality Date    BREAST SURGERY      Benign breast biopsies     SECTION  ,    COLONOSCOPY  2012    -NORMAL REPEAT IN 10 YEARS     COLONOSCOPY N/A 8/15/2023    Procedure: COLONOSCOPY;  Surgeon: Hamilton Harris MD;  Location: [facility] ENDOSCOPY;  Service: Gastroenterology;  Laterality: N/A;  NORMAL COLONOSCOPY    DILATION AND CURETTAGE, DIAGNOSTIC / THERAPEUTIC  ,    EYE SURGERY      HYSTERECTOMY      JOINT REPLACEMENT      TUBAL ABDOMINAL LIGATION          Social History     Tobacco Use    Smoking status: Never     Passive exposure: Never    Smokeless tobacco: Never   Substance Use Topics    Alcohol use: Never       Family History   Problem Relation  "Age of Onset    Stroke Mother     Heart disease Mother         Bypass age 83.  11 age 96    Hypertension Mother     Heart disease Father         Abdominal aneurysm.  after surgery age 69    Hypertension Father     Heart disease Brother         Heart attacks mid 40’s.    Arthritis Brother     Heart disease Brother         Cardiac bypass age 70. Current age 80.    Colon cancer Neg Hx         Current Outpatient Medications on File Prior to Visit   Medication Sig    aspirin 81 MG EC tablet Take 1 tablet by mouth Daily.    baclofen (LIORESAL) 10 MG tablet TAKE 1 TABLET DAILY    buPROPion XL (WELLBUTRIN XL) 150 MG 24 hr tablet Take 1 tablet by mouth Daily.    Cholecalciferol 50 MCG ( UT) tablet Vitamin D3 2,000 unit oral tablet take 1 tablet by oral route daily   Active    diclofenac (VOLTAREN) 75 MG EC tablet Take 1 tablet by mouth 2 (Two) Times a Day.    Diclofenac Sodium (VOLTAREN) 1 % gel gel Apply 4 g topically to the appropriate area as directed 4 (Four) Times a Day As Needed (JOINT PAIN).    FeroSul 325 (65 Fe) MG tablet Take 1 tablet by mouth 2 (Two) Times a Day.    FLUoxetine (PROzac) 40 MG capsule Take 1 capsule by mouth Every Morning.    losartan-hydrochlorothiazide (HYZAAR) 100-25 MG per tablet Take 1 tablet by mouth Daily.    metFORMIN (GLUCOPHAGE) 1000 MG tablet Take 1 tablet by mouth 2 (Two) Times a Day With Meals.    pravastatin (PRAVACHOL) 20 MG tablet Take 1 tablet by mouth Daily.    [DISCONTINUED] methylPREDNISolone (MEDROL) 4 MG dose pack Take as directed on package instructions. (Patient not taking: Reported on 11/15/2023)     No current facility-administered medications on file prior to visit.       Health Maintenance Due   Topic Date Due    DIABETIC EYE EXAM  2021    DIABETIC FOOT EXAM  10/27/2023       Objective     /54   Pulse 86   Ht 160 cm (63\")   Wt 96.2 kg (212 lb)   SpO2 97%   BMI 37.55 kg/m²       Physical Exam  Constitutional:       General: She is not in " acute distress.     Appearance: Normal appearance. She is not ill-appearing.   HENT:      Head: Normocephalic and atraumatic.   Cardiovascular:      Rate and Rhythm: Normal rate and regular rhythm.      Pulses:           Dorsalis pedis pulses are 2+ on the right side and 2+ on the left side.      Heart sounds: Normal heart sounds. No murmur heard.  Pulmonary:      Effort: Pulmonary effort is normal. No respiratory distress.      Breath sounds: Normal breath sounds.   Chest:      Chest wall: No tenderness.   Abdominal:      General: Abdomen is flat. Bowel sounds are normal. There is no distension.      Palpations: Abdomen is soft. There is no mass.      Tenderness: There is no abdominal tenderness. There is no guarding.   Musculoskeletal:         General: No swelling or tenderness. Normal range of motion.      Cervical back: Normal range of motion and neck supple.   Feet:      Right foot:      Protective Sensation: 3 sites tested.  3 sites sensed.      Skin integrity: Skin integrity normal. No ulcer or blister.      Toenail Condition: Right toenails are normal.      Left foot:      Protective Sensation: 3 sites tested.  3 sites sensed.      Skin integrity: Skin integrity normal. No ulcer or blister.      Toenail Condition: Left toenails are normal.      Comments:      Skin:     General: Skin is warm and dry.      Findings: No rash.   Neurological:      General: No focal deficit present.      Mental Status: She is alert and oriented to person, place, and time. Mental status is at baseline.      Gait: Gait normal.   Psychiatric:         Mood and Affect: Mood normal.         Behavior: Behavior normal.         Thought Content: Thought content normal.         Judgment: Judgment normal.     Monofilament Test Performed      Result Review :                           Assessment and Plan        Diagnoses and all orders for this visit:    1. Primary hypertension (Primary)  Comments:  stable on hyzaar 100/25mg,  continue  Orders:  -     CBC w AUTO Differential; Future    2. Type 2 diabetes mellitus without complication, without long-term current use of insulin  Comments:  stable on metfomin 1000mg, continue  Orders:  -     MicroAlbumin, Urine, Random - Urine, Clean Catch; Future    3. Hyperlipidemia, unspecified hyperlipidemia type  Comments:  stable on pravachol 20mg, continue  Orders:  -     Comprehensive metabolic panel; Future  -     Lipid panel; Future    4. Depression, unspecified depression type  Comments:  stable on wellbutirn 150mg and prozac 40mg, continue    5. Vitamin D deficiency  Comments:  stable on vitamin D 2000UT, continue  Orders:  -     Vitamin D 25 hydroxy; Future    6. Anemia, unspecified type  Comments:  stable on FeroSul 325mg, continue  Orders:  -     Iron Profile; Future    7. Screening for thyroid disorder  -     TSH; Future    8. Arthritis  Comments:  stable owith voltaren gel prn, continue              Follow Up     Return in about 6 months (around 5/15/2024).    Patient was given instructions and counseling regarding her condition or for health maintenance advice. Please see specific information pulled into the AVS if appropriate.     Adelita Garcia  reports that she has never smoked. She has never been exposed to tobacco smoke. She has never used smokeless tobacco..

## 2023-12-02 DIAGNOSIS — M25.50 ARTHRALGIA, UNSPECIFIED JOINT: ICD-10-CM

## 2023-12-02 DIAGNOSIS — F41.9 ANXIETY DISORDER, UNSPECIFIED TYPE: ICD-10-CM

## 2023-12-04 DIAGNOSIS — I10 ESSENTIAL HYPERTENSION: ICD-10-CM

## 2023-12-04 RX ORDER — BACLOFEN 10 MG/1
TABLET ORAL
Qty: 90 TABLET | Refills: 1 | Status: SHIPPED | OUTPATIENT
Start: 2023-12-04

## 2023-12-04 RX ORDER — BUPROPION HYDROCHLORIDE 150 MG/1
150 TABLET ORAL DAILY
Qty: 90 TABLET | Refills: 1 | Status: SHIPPED | OUTPATIENT
Start: 2023-12-04

## 2023-12-04 RX ORDER — LOSARTAN POTASSIUM AND HYDROCHLOROTHIAZIDE 25; 100 MG/1; MG/1
1 TABLET ORAL DAILY
Qty: 90 TABLET | Refills: 1 | Status: SHIPPED | OUTPATIENT
Start: 2023-12-04

## 2023-12-05 ENCOUNTER — LAB (OUTPATIENT)
Dept: LAB | Facility: HOSPITAL | Age: 68
End: 2023-12-05
Payer: MEDICARE

## 2023-12-05 DIAGNOSIS — E78.5 HYPERLIPIDEMIA, UNSPECIFIED HYPERLIPIDEMIA TYPE: ICD-10-CM

## 2023-12-05 DIAGNOSIS — I10 PRIMARY HYPERTENSION: ICD-10-CM

## 2023-12-05 DIAGNOSIS — E55.9 VITAMIN D DEFICIENCY: ICD-10-CM

## 2023-12-05 DIAGNOSIS — Z13.29 SCREENING FOR THYROID DISORDER: ICD-10-CM

## 2023-12-05 DIAGNOSIS — E11.9 TYPE 2 DIABETES MELLITUS WITHOUT COMPLICATION, WITHOUT LONG-TERM CURRENT USE OF INSULIN: ICD-10-CM

## 2023-12-05 DIAGNOSIS — D64.9 ANEMIA, UNSPECIFIED TYPE: ICD-10-CM

## 2023-12-05 LAB
25(OH)D3 SERPL-MCNC: 43.8 NG/ML (ref 30–100)
ALBUMIN SERPL-MCNC: 4.1 G/DL (ref 3.5–5.2)
ALBUMIN UR-MCNC: <1.2 MG/DL
ALBUMIN/GLOB SERPL: 1.5 G/DL
ALP SERPL-CCNC: 105 U/L (ref 39–117)
ALT SERPL W P-5'-P-CCNC: 21 U/L (ref 1–33)
ANION GAP SERPL CALCULATED.3IONS-SCNC: 12.1 MMOL/L (ref 5–15)
AST SERPL-CCNC: <5 U/L (ref 1–32)
BASOPHILS # BLD AUTO: 0.04 10*3/MM3 (ref 0–0.2)
BASOPHILS NFR BLD AUTO: 0.5 % (ref 0–1.5)
BILIRUB SERPL-MCNC: 0.3 MG/DL (ref 0–1.2)
BUN SERPL-MCNC: 12 MG/DL (ref 8–23)
BUN/CREAT SERPL: 20.3 (ref 7–25)
CALCIUM SPEC-SCNC: 9.4 MG/DL (ref 8.6–10.5)
CHLORIDE SERPL-SCNC: 98 MMOL/L (ref 98–107)
CHOLEST SERPL-MCNC: 187 MG/DL (ref 0–200)
CO2 SERPL-SCNC: 27.9 MMOL/L (ref 22–29)
CREAT SERPL-MCNC: 0.59 MG/DL (ref 0.57–1)
DEPRECATED RDW RBC AUTO: 44.3 FL (ref 37–54)
EGFRCR SERPLBLD CKD-EPI 2021: 98.9 ML/MIN/1.73
EOSINOPHIL # BLD AUTO: 0.34 10*3/MM3 (ref 0–0.4)
EOSINOPHIL NFR BLD AUTO: 4.4 % (ref 0.3–6.2)
ERYTHROCYTE [DISTWIDTH] IN BLOOD BY AUTOMATED COUNT: 14.8 % (ref 12.3–15.4)
GLOBULIN UR ELPH-MCNC: 2.7 GM/DL
GLUCOSE SERPL-MCNC: 111 MG/DL (ref 65–99)
HCT VFR BLD AUTO: 37 % (ref 34–46.6)
HDLC SERPL-MCNC: 40 MG/DL (ref 40–60)
HGB BLD-MCNC: 12.3 G/DL (ref 12–15.9)
IMM GRANULOCYTES # BLD AUTO: 0.03 10*3/MM3 (ref 0–0.05)
IMM GRANULOCYTES NFR BLD AUTO: 0.4 % (ref 0–0.5)
IRON 24H UR-MRATE: 47 MCG/DL (ref 37–145)
IRON SATN MFR SERPL: 13 % (ref 20–50)
LDLC SERPL CALC-MCNC: 114 MG/DL (ref 0–100)
LDLC/HDLC SERPL: 2.73 {RATIO}
LYMPHOCYTES # BLD AUTO: 1.75 10*3/MM3 (ref 0.7–3.1)
LYMPHOCYTES NFR BLD AUTO: 22.6 % (ref 19.6–45.3)
MCH RBC QN AUTO: 27.4 PG (ref 26.6–33)
MCHC RBC AUTO-ENTMCNC: 33.2 G/DL (ref 31.5–35.7)
MCV RBC AUTO: 82.4 FL (ref 79–97)
MONOCYTES # BLD AUTO: 0.39 10*3/MM3 (ref 0.1–0.9)
MONOCYTES NFR BLD AUTO: 5 % (ref 5–12)
NEUTROPHILS NFR BLD AUTO: 5.2 10*3/MM3 (ref 1.7–7)
NEUTROPHILS NFR BLD AUTO: 67.1 % (ref 42.7–76)
NRBC BLD AUTO-RTO: 0 /100 WBC (ref 0–0.2)
PLATELET # BLD AUTO: 310 10*3/MM3 (ref 140–450)
PMV BLD AUTO: 10.8 FL (ref 6–12)
POTASSIUM SERPL-SCNC: 4.2 MMOL/L (ref 3.5–5.2)
PROT SERPL-MCNC: 6.8 G/DL (ref 6–8.5)
RBC # BLD AUTO: 4.49 10*6/MM3 (ref 3.77–5.28)
SODIUM SERPL-SCNC: 138 MMOL/L (ref 136–145)
TIBC SERPL-MCNC: 374 MCG/DL (ref 298–536)
TRANSFERRIN SERPL-MCNC: 251 MG/DL (ref 200–360)
TRIGL SERPL-MCNC: 190 MG/DL (ref 0–150)
TSH SERPL DL<=0.05 MIU/L-ACNC: 2.03 UIU/ML (ref 0.27–4.2)
VLDLC SERPL-MCNC: 33 MG/DL (ref 5–40)
WBC NRBC COR # BLD AUTO: 7.75 10*3/MM3 (ref 3.4–10.8)

## 2023-12-05 PROCEDURE — 84466 ASSAY OF TRANSFERRIN: CPT

## 2023-12-05 PROCEDURE — 82043 UR ALBUMIN QUANTITATIVE: CPT

## 2023-12-05 PROCEDURE — 84443 ASSAY THYROID STIM HORMONE: CPT

## 2023-12-05 PROCEDURE — 80053 COMPREHEN METABOLIC PANEL: CPT

## 2023-12-05 PROCEDURE — 83540 ASSAY OF IRON: CPT

## 2023-12-05 PROCEDURE — 85025 COMPLETE CBC W/AUTO DIFF WBC: CPT

## 2023-12-05 PROCEDURE — 80061 LIPID PANEL: CPT

## 2023-12-05 PROCEDURE — 36415 COLL VENOUS BLD VENIPUNCTURE: CPT

## 2023-12-05 PROCEDURE — 82306 VITAMIN D 25 HYDROXY: CPT

## 2023-12-17 DIAGNOSIS — E11.69 TYPE 2 DIABETES MELLITUS WITH OTHER SPECIFIED COMPLICATION, UNSPECIFIED WHETHER LONG TERM INSULIN USE: ICD-10-CM

## 2023-12-18 NOTE — TELEPHONE ENCOUNTER
Antihyperglycemics Protocol [id number]/17/2023 09:19 PM   Protocol Details HgA1c in past 6 months    No active pregnancy on record    Lipid panel in past year    No positive pregnancy test in the past 12 months    GFR >30ml/min in past year    Recent or future visit with authorizing provider        A1c 5/25/23 6.3

## 2023-12-28 ENCOUNTER — LAB (OUTPATIENT)
Dept: LAB | Facility: HOSPITAL | Age: 68
End: 2023-12-28
Payer: MEDICARE

## 2023-12-28 DIAGNOSIS — E11.69 TYPE 2 DIABETES MELLITUS WITH OTHER SPECIFIED COMPLICATION, UNSPECIFIED WHETHER LONG TERM INSULIN USE: ICD-10-CM

## 2023-12-28 PROCEDURE — 83036 HEMOGLOBIN GLYCOSYLATED A1C: CPT

## 2023-12-28 PROCEDURE — 36415 COLL VENOUS BLD VENIPUNCTURE: CPT

## 2023-12-29 DIAGNOSIS — E11.9 TYPE 2 DIABETES MELLITUS WITHOUT COMPLICATION, WITHOUT LONG-TERM CURRENT USE OF INSULIN: Primary | ICD-10-CM

## 2023-12-29 LAB — HBA1C MFR BLD: 6.5 % (ref 4.8–5.6)

## 2024-01-14 DIAGNOSIS — F32.9 MAJOR DEPRESSIVE DISORDER, REMISSION STATUS UNSPECIFIED, UNSPECIFIED WHETHER RECURRENT: ICD-10-CM

## 2024-01-15 RX ORDER — FLUOXETINE HYDROCHLORIDE 40 MG/1
40 CAPSULE ORAL EVERY MORNING
Qty: 90 CAPSULE | Refills: 1 | Status: SHIPPED | OUTPATIENT
Start: 2024-01-15

## 2024-03-10 DIAGNOSIS — M19.90 ARTHRITIS: ICD-10-CM

## 2024-03-11 RX ORDER — DICLOFENAC SODIUM 75 MG/1
75 TABLET, DELAYED RELEASE ORAL 2 TIMES DAILY
Qty: 180 TABLET | Refills: 1 | Status: SHIPPED | OUTPATIENT
Start: 2024-03-11

## 2024-03-27 ENCOUNTER — LAB (OUTPATIENT)
Dept: LAB | Facility: HOSPITAL | Age: 69
End: 2024-03-27
Payer: MEDICARE

## 2024-03-27 DIAGNOSIS — E11.9 TYPE 2 DIABETES MELLITUS WITHOUT COMPLICATION, WITHOUT LONG-TERM CURRENT USE OF INSULIN: ICD-10-CM

## 2024-03-27 LAB — HBA1C MFR BLD: 6.3 % (ref 4.8–5.6)

## 2024-03-27 PROCEDURE — 83036 HEMOGLOBIN GLYCOSYLATED A1C: CPT

## 2024-03-27 PROCEDURE — 36415 COLL VENOUS BLD VENIPUNCTURE: CPT

## 2024-05-15 ENCOUNTER — OFFICE VISIT (OUTPATIENT)
Dept: FAMILY MEDICINE CLINIC | Facility: CLINIC | Age: 69
End: 2024-05-15
Payer: MEDICARE

## 2024-05-15 VITALS
HEART RATE: 88 BPM | OXYGEN SATURATION: 98 % | HEIGHT: 63 IN | WEIGHT: 210 LBS | DIASTOLIC BLOOD PRESSURE: 60 MMHG | SYSTOLIC BLOOD PRESSURE: 133 MMHG | BODY MASS INDEX: 37.21 KG/M2

## 2024-05-15 DIAGNOSIS — Z12.31 ENCOUNTER FOR SCREENING MAMMOGRAM FOR MALIGNANT NEOPLASM OF BREAST: ICD-10-CM

## 2024-05-15 DIAGNOSIS — I10 ESSENTIAL HYPERTENSION: ICD-10-CM

## 2024-05-15 DIAGNOSIS — Z13.29 SCREENING FOR THYROID DISORDER: ICD-10-CM

## 2024-05-15 DIAGNOSIS — F41.9 ANXIETY DISORDER, UNSPECIFIED TYPE: ICD-10-CM

## 2024-05-15 DIAGNOSIS — Z78.0 MENOPAUSE: ICD-10-CM

## 2024-05-15 DIAGNOSIS — R53.83 OTHER FATIGUE: ICD-10-CM

## 2024-05-15 DIAGNOSIS — M19.90 ARTHRITIS: ICD-10-CM

## 2024-05-15 DIAGNOSIS — Z13.820 SCREENING FOR OSTEOPOROSIS: Primary | ICD-10-CM

## 2024-05-15 DIAGNOSIS — F32.9 MAJOR DEPRESSIVE DISORDER, REMISSION STATUS UNSPECIFIED, UNSPECIFIED WHETHER RECURRENT: ICD-10-CM

## 2024-05-15 DIAGNOSIS — E55.9 VITAMIN D DEFICIENCY: ICD-10-CM

## 2024-05-15 DIAGNOSIS — D64.9 ANEMIA, UNSPECIFIED TYPE: ICD-10-CM

## 2024-05-15 DIAGNOSIS — E78.1 HYPERTRIGLYCERIDEMIA: ICD-10-CM

## 2024-05-15 DIAGNOSIS — E11.69 TYPE 2 DIABETES MELLITUS WITH OTHER SPECIFIED COMPLICATION, UNSPECIFIED WHETHER LONG TERM INSULIN USE: ICD-10-CM

## 2024-05-15 DIAGNOSIS — M25.50 ARTHRALGIA, UNSPECIFIED JOINT: ICD-10-CM

## 2024-05-15 DIAGNOSIS — F51.01 PRIMARY INSOMNIA: ICD-10-CM

## 2024-05-15 PROCEDURE — 3075F SYST BP GE 130 - 139MM HG: CPT | Performed by: NURSE PRACTITIONER

## 2024-05-15 PROCEDURE — 1126F AMNT PAIN NOTED NONE PRSNT: CPT | Performed by: NURSE PRACTITIONER

## 2024-05-15 PROCEDURE — 3078F DIAST BP <80 MM HG: CPT | Performed by: NURSE PRACTITIONER

## 2024-05-15 PROCEDURE — 3044F HG A1C LEVEL LT 7.0%: CPT | Performed by: NURSE PRACTITIONER

## 2024-05-15 PROCEDURE — 1159F MED LIST DOCD IN RCRD: CPT | Performed by: NURSE PRACTITIONER

## 2024-05-15 PROCEDURE — 1160F RVW MEDS BY RX/DR IN RCRD: CPT | Performed by: NURSE PRACTITIONER

## 2024-05-15 PROCEDURE — 99214 OFFICE O/P EST MOD 30 MIN: CPT | Performed by: NURSE PRACTITIONER

## 2024-05-15 RX ORDER — LOSARTAN POTASSIUM AND HYDROCHLOROTHIAZIDE 25; 100 MG/1; MG/1
1 TABLET ORAL DAILY
Qty: 90 TABLET | Refills: 1 | Status: SHIPPED | OUTPATIENT
Start: 2024-05-15

## 2024-05-15 RX ORDER — FLUOXETINE HYDROCHLORIDE 40 MG/1
40 CAPSULE ORAL EVERY MORNING
Qty: 90 CAPSULE | Refills: 1 | Status: SHIPPED | OUTPATIENT
Start: 2024-05-15

## 2024-05-15 RX ORDER — BACLOFEN 10 MG/1
10 TABLET ORAL DAILY
Qty: 90 TABLET | Refills: 1 | Status: SHIPPED | OUTPATIENT
Start: 2024-05-15

## 2024-05-15 RX ORDER — TRAZODONE HYDROCHLORIDE 50 MG/1
50 TABLET ORAL NIGHTLY
Qty: 90 TABLET | Refills: 1 | Status: SHIPPED | OUTPATIENT
Start: 2024-05-15

## 2024-05-15 RX ORDER — BUPROPION HYDROCHLORIDE 150 MG/1
150 TABLET ORAL DAILY
Qty: 90 TABLET | Refills: 1 | Status: SHIPPED | OUTPATIENT
Start: 2024-05-15

## 2024-05-15 RX ORDER — DICLOFENAC SODIUM 75 MG/1
75 TABLET, DELAYED RELEASE ORAL 2 TIMES DAILY
Qty: 180 TABLET | Refills: 1 | Status: SHIPPED | OUTPATIENT
Start: 2024-05-15

## 2024-05-15 RX ORDER — FERROUS SULFATE 325(65) MG
1 TABLET ORAL 2 TIMES DAILY
Qty: 180 TABLET | Refills: 1 | Status: SHIPPED | OUTPATIENT
Start: 2024-05-15

## 2024-05-15 RX ORDER — PRAVASTATIN SODIUM 20 MG
20 TABLET ORAL DAILY
Qty: 90 TABLET | Refills: 1 | Status: SHIPPED | OUTPATIENT
Start: 2024-05-15

## 2024-05-15 NOTE — PROGRESS NOTES
Chief Complaint  Anxiety, Arthritis, Anemia, Hypertension, Depression, Hyperlipidemia, and Diabetes    Subjective            Adelita Garcia presents to [facility]  History of Present Illness  Pt is a f/u for anxiety, arthritis, anemia, HTN, depression, HLD, and DM2. She complains of increased fatigue she thinks due to not sleeping.    Pt has c/o insomnia. Pt states she has had a lot of stress factors in her life, she is trying to sell her moms house, her cat diet and her  has prostate cancer. Pt has been taking benadryl at night with little relief.     Pt is due labs/orders placed.    Pt is due mammogram and dexa 24/orders placed.    PT is due RSV vaccine. PT will get at later date.     PT has MAW schedule 24        Past Medical History:   Diagnosis Date    Abnormal bone density screening 10/17/2019    NORMAL    Anemia     Anxiety disorder     Arthritis 2015    Cataract     Removed    Diabetes mellitus     TYPE 2    Diabetic eye exam 2019    SCANNED IN CHART    Essential hypertension     Eye exam, routine 2019    YEARLY EYE EXAM    Hyperlipidemia     Hypertriglyceridemia 09/15/2017    Insomnia 2015    Joint pain     Major depressive disorder 2018    Pap smear for cervical cancer screening     NO LONGER    Visit for screening mammogram 01/15/2020    NORMAL    Vitamin D deficiency 2018       No Known Allergies     Past Surgical History:   Procedure Laterality Date    BREAST SURGERY      Benign breast biopsies     SECTION  ,    COLONOSCOPY  2012    -NORMAL REPEAT IN 10 YEARS     COLONOSCOPY N/A 8/15/2023    Procedure: COLONOSCOPY;  Surgeon: Hamilton Harris MD;  Location: [facility] ENDOSCOPY;  Service: Gastroenterology;  Laterality: N/A;  NORMAL COLONOSCOPY    DILATION AND CURETTAGE, DIAGNOSTIC / THERAPEUTIC  ,    EYE SURGERY      HYSTERECTOMY      JOINT REPLACEMENT      TUBAL  ABDOMINAL LIGATION          Social History     Tobacco Use    Smoking status: Never     Passive exposure: Never    Smokeless tobacco: Never   Substance Use Topics    Alcohol use: Never       Family History   Problem Relation Age of Onset    Stroke Mother     Heart disease Mother         Bypass age 83.  11-22 age 96    Hypertension Mother     Heart disease Father         Abdominal aneurysm.  after surgery age 69    Hypertension Father     Heart disease Brother         Heart attacks mid 40’s.    Arthritis Brother     Heart disease Brother         Cardiac bypass age 70. Current age 79.    Colon cancer Neg Hx         Current Outpatient Medications on File Prior to Visit   Medication Sig    aspirin 81 MG EC tablet Take 1 tablet by mouth Daily.    Cholecalciferol 50 MCG ( UT) tablet Vitamin D3 2,000 unit oral tablet take 1 tablet by oral route daily   Active    Diclofenac Sodium (VOLTAREN) 1 % gel gel Apply 4 g topically to the appropriate area as directed 4 (Four) Times a Day As Needed (JOINT PAIN).    [DISCONTINUED] baclofen (LIORESAL) 10 MG tablet TAKE 1 TABLET DAILY    [DISCONTINUED] buPROPion XL (WELLBUTRIN XL) 150 MG 24 hr tablet TAKE 1 TABLET DAILY    [DISCONTINUED] diclofenac (VOLTAREN) 75 MG EC tablet TAKE 1 TABLET TWICE A DAY    [DISCONTINUED] FeroSul 325 (65 Fe) MG tablet Take 1 tablet by mouth 2 (Two) Times a Day.    [DISCONTINUED] FLUoxetine (PROzac) 40 MG capsule TAKE 1 CAPSULE EVERY MORNING    [DISCONTINUED] losartan-hydrochlorothiazide (HYZAAR) 100-25 MG per tablet TAKE 1 TABLET DAILY    [DISCONTINUED] metFORMIN (GLUCOPHAGE) 1000 MG tablet TAKE 1 TABLET TWICE A DAY WITH MEALS    [DISCONTINUED] pravastatin (PRAVACHOL) 20 MG tablet Take 1 tablet by mouth Daily.    [DISCONTINUED] empagliflozin (Jardiance) 10 MG tablet tablet Take 1 tablet by mouth Daily. (Patient not taking: Reported on 5/15/2024)     No current facility-administered medications on file prior to visit.       Health Maintenance  "Due   Topic Date Due    BMI FOLLOWUP  05/15/2024    ANNUAL WELLNESS VISIT  06/19/2024    DXA SCAN  07/27/2024       Objective     /60   Pulse 88   Ht 160 cm (63\")   Wt 95.3 kg (210 lb)   SpO2 98%   BMI 37.20 kg/m²       Physical Exam  Constitutional:       General: She is not in acute distress.     Appearance: Normal appearance. She is not ill-appearing.   HENT:      Head: Normocephalic and atraumatic.      Right Ear: Tympanic membrane, ear canal and external ear normal.      Left Ear: Tympanic membrane, ear canal and external ear normal.      Nose: Nose normal.   Cardiovascular:      Rate and Rhythm: Normal rate and regular rhythm.      Heart sounds: Normal heart sounds. No murmur heard.  Pulmonary:      Effort: Pulmonary effort is normal. No respiratory distress.      Breath sounds: Normal breath sounds.   Chest:      Chest wall: No tenderness.   Abdominal:      General: Abdomen is flat. Bowel sounds are normal. There is no distension.      Palpations: Abdomen is soft. There is no mass.      Tenderness: There is no abdominal tenderness. There is no guarding.   Musculoskeletal:         General: No swelling or tenderness. Normal range of motion.      Cervical back: Normal range of motion and neck supple.   Skin:     General: Skin is warm and dry.      Findings: No rash.   Neurological:      General: No focal deficit present.      Mental Status: She is alert and oriented to person, place, and time. Mental status is at baseline.      Gait: Gait normal.   Psychiatric:         Attention and Perception: Attention normal.         Mood and Affect: Mood and affect normal.         Speech: Speech normal.         Behavior: Behavior normal. Behavior is cooperative.         Thought Content: Thought content normal. Thought content does not include homicidal or suicidal ideation.         Judgment: Judgment normal.         Class 2 Severe Obesity (BMI >=35 and <=39.9). Obesity-related health conditions include the " following: hypertension, diabetes mellitus, and dyslipidemias. Obesity is unchanged. BMI is is above average; BMI management plan is completed. We discussed portion control and increasing exercise.        Result Review :                           Assessment and Plan        Diagnoses and all orders for this visit:    1. Screening for osteoporosis (Primary)  -     DEXA Bone Density Axial    2. Menopause  -     DEXA Bone Density Axial    3. Vitamin D deficiency  Comments:  stableon VIt D 2000UT, continue  Orders:  -     Vitamin D 25 hydroxy; Future    4. Arthritis  Comments:  stable on Voltaren 75mg, continue  Orders:  -     diclofenac (VOLTAREN) 75 MG EC tablet; Take 1 tablet by mouth 2 (Two) Times a Day.  Dispense: 180 tablet; Refill: 1    5. Screening for thyroid disorder  -     TSH; Future    6. Encounter for screening mammogram for malignant neoplasm of breast  -     Mammo Screening Digital Tomosynthesis Bilateral With CAD; Future    7. Arthralgia, unspecified joint  Comments:  stable on diclofenac 75mg adn baclofen, continue  Orders:  -     baclofen (LIORESAL) 10 MG tablet; Take 1 tablet by mouth Daily.  Dispense: 90 tablet; Refill: 1    8. Anxiety disorder, unspecified type  Comments:  stable on wellbutrin 150mg  and prozac 40mg, continue  Orders:  -     buPROPion XL (WELLBUTRIN XL) 150 MG 24 hr tablet; Take 1 tablet by mouth Daily.  Dispense: 90 tablet; Refill: 1    9. Anemia, unspecified type  Comments:  stable on iron 325mg bid, conitnue  Orders:  -     Iron Profile; Future  -     FeroSul 325 (65 Fe) MG tablet; Take 1 tablet by mouth 2 (Two) Times a Day.  Dispense: 180 tablet; Refill: 1    10. Major depressive disorder, remission status unspecified, unspecified whether recurrent  Comments:  stable on prozac 40mg and wellbutrin 150mg continue  Orders:  -     FLUoxetine (PROzac) 40 MG capsule; Take 1 capsule by mouth Every Morning.  Dispense: 90 capsule; Refill: 1    11. Essential  hypertension  Comments:  stable on hyzaar 100/25mg  Orders:  -     CBC w AUTO Differential; Future  -     losartan-hydrochlorothiazide (HYZAAR) 100-25 MG per tablet; Take 1 tablet by mouth Daily.  Dispense: 90 tablet; Refill: 1    12. Type 2 diabetes mellitus with other specified complication, unspecified whether long term insulin use  Comments:  stable on metformin 1000mg, continue  Orders:  -     MicroAlbumin, Urine, Random - Urine, Clean Catch; Future  -     metFORMIN (GLUCOPHAGE) 1000 MG tablet; Take 1 tablet by mouth 2 (Two) Times a Day With Meals.  Dispense: 180 tablet; Refill: 1    13. Hypertriglyceridemia  Comments:  stable on pravochol 20mg, continue  Orders:  -     Comprehensive metabolic panel; Future  -     Lipid panel; Future  -     pravastatin (PRAVACHOL) 20 MG tablet; Take 1 tablet by mouth Daily.  Dispense: 90 tablet; Refill: 1    14. Primary insomnia  -     traZODone (DESYREL) 50 MG tablet; Take 1 tablet by mouth Every Night.  Dispense: 90 tablet; Refill: 1    15. Other fatigue  -     Vitamin B12; Future  -     Folate; Future              Follow Up     Return in about 6 months (around 11/15/2024), or if symptoms worsen or fail to improve.    Patient was given instructions and counseling regarding her condition or for health maintenance advice. Please see specific information pulled into the AVS if appropriate.     Adelita Garcia  reports that she has never smoked. She has never been exposed to tobacco smoke. She has never used smokeless tobacco.

## 2024-05-30 ENCOUNTER — LAB (OUTPATIENT)
Dept: LAB | Facility: HOSPITAL | Age: 69
End: 2024-05-30
Payer: MEDICARE

## 2024-05-30 DIAGNOSIS — E11.69 TYPE 2 DIABETES MELLITUS WITH OTHER SPECIFIED COMPLICATION, UNSPECIFIED WHETHER LONG TERM INSULIN USE: ICD-10-CM

## 2024-05-30 DIAGNOSIS — E78.1 HYPERTRIGLYCERIDEMIA: ICD-10-CM

## 2024-05-30 DIAGNOSIS — I10 ESSENTIAL HYPERTENSION: ICD-10-CM

## 2024-05-30 DIAGNOSIS — R53.83 OTHER FATIGUE: ICD-10-CM

## 2024-05-30 DIAGNOSIS — E55.9 VITAMIN D DEFICIENCY: ICD-10-CM

## 2024-05-30 DIAGNOSIS — Z13.29 SCREENING FOR THYROID DISORDER: ICD-10-CM

## 2024-05-30 DIAGNOSIS — D64.9 ANEMIA, UNSPECIFIED TYPE: ICD-10-CM

## 2024-05-30 LAB
25(OH)D3 SERPL-MCNC: 35.5 NG/ML (ref 30–100)
ALBUMIN SERPL-MCNC: 3.8 G/DL (ref 3.5–5.2)
ALBUMIN UR-MCNC: 1.8 MG/DL
ALBUMIN/GLOB SERPL: 1.3 G/DL
ALP SERPL-CCNC: 103 U/L (ref 39–117)
ALT SERPL W P-5'-P-CCNC: 21 U/L (ref 1–33)
ANION GAP SERPL CALCULATED.3IONS-SCNC: 13.7 MMOL/L (ref 5–15)
AST SERPL-CCNC: 11 U/L (ref 1–32)
BASOPHILS # BLD AUTO: 0.04 10*3/MM3 (ref 0–0.2)
BASOPHILS NFR BLD AUTO: 0.5 % (ref 0–1.5)
BILIRUB SERPL-MCNC: 0.2 MG/DL (ref 0–1.2)
BUN SERPL-MCNC: 16 MG/DL (ref 8–23)
BUN/CREAT SERPL: 26.2 (ref 7–25)
CALCIUM SPEC-SCNC: 9.3 MG/DL (ref 8.6–10.5)
CHLORIDE SERPL-SCNC: 99 MMOL/L (ref 98–107)
CHOLEST SERPL-MCNC: 202 MG/DL (ref 0–200)
CO2 SERPL-SCNC: 25.3 MMOL/L (ref 22–29)
CREAT SERPL-MCNC: 0.61 MG/DL (ref 0.57–1)
DEPRECATED RDW RBC AUTO: 44.1 FL (ref 37–54)
EGFRCR SERPLBLD CKD-EPI 2021: 98.1 ML/MIN/1.73
EOSINOPHIL # BLD AUTO: 0.3 10*3/MM3 (ref 0–0.4)
EOSINOPHIL NFR BLD AUTO: 3.6 % (ref 0.3–6.2)
ERYTHROCYTE [DISTWIDTH] IN BLOOD BY AUTOMATED COUNT: 14.5 % (ref 12.3–15.4)
FOLATE SERPL-MCNC: 11.7 NG/ML (ref 4.78–24.2)
GLOBULIN UR ELPH-MCNC: 2.9 GM/DL
GLUCOSE SERPL-MCNC: 105 MG/DL (ref 65–99)
HCT VFR BLD AUTO: 36.8 % (ref 34–46.6)
HDLC SERPL-MCNC: 41 MG/DL (ref 40–60)
HGB BLD-MCNC: 12.1 G/DL (ref 12–15.9)
IMM GRANULOCYTES # BLD AUTO: 0.03 10*3/MM3 (ref 0–0.05)
IMM GRANULOCYTES NFR BLD AUTO: 0.4 % (ref 0–0.5)
IRON 24H UR-MRATE: 41 MCG/DL (ref 37–145)
IRON SATN MFR SERPL: 11 % (ref 20–50)
LDLC SERPL CALC-MCNC: 132 MG/DL (ref 0–100)
LDLC/HDLC SERPL: 3.15 {RATIO}
LYMPHOCYTES # BLD AUTO: 1.83 10*3/MM3 (ref 0.7–3.1)
LYMPHOCYTES NFR BLD AUTO: 22.1 % (ref 19.6–45.3)
MCH RBC QN AUTO: 27.6 PG (ref 26.6–33)
MCHC RBC AUTO-ENTMCNC: 32.9 G/DL (ref 31.5–35.7)
MCV RBC AUTO: 83.8 FL (ref 79–97)
MONOCYTES # BLD AUTO: 0.41 10*3/MM3 (ref 0.1–0.9)
MONOCYTES NFR BLD AUTO: 4.9 % (ref 5–12)
NEUTROPHILS NFR BLD AUTO: 5.68 10*3/MM3 (ref 1.7–7)
NEUTROPHILS NFR BLD AUTO: 68.5 % (ref 42.7–76)
NRBC BLD AUTO-RTO: 0 /100 WBC (ref 0–0.2)
PLATELET # BLD AUTO: 353 10*3/MM3 (ref 140–450)
PMV BLD AUTO: 11.1 FL (ref 6–12)
POTASSIUM SERPL-SCNC: 4 MMOL/L (ref 3.5–5.2)
PROT SERPL-MCNC: 6.7 G/DL (ref 6–8.5)
RBC # BLD AUTO: 4.39 10*6/MM3 (ref 3.77–5.28)
SODIUM SERPL-SCNC: 138 MMOL/L (ref 136–145)
TIBC SERPL-MCNC: 377 MCG/DL (ref 298–536)
TRANSFERRIN SERPL-MCNC: 253 MG/DL (ref 200–360)
TRIGL SERPL-MCNC: 159 MG/DL (ref 0–150)
TSH SERPL DL<=0.05 MIU/L-ACNC: 1.62 UIU/ML (ref 0.27–4.2)
VIT B12 BLD-MCNC: 789 PG/ML (ref 211–946)
VLDLC SERPL-MCNC: 29 MG/DL (ref 5–40)
WBC NRBC COR # BLD AUTO: 8.29 10*3/MM3 (ref 3.4–10.8)

## 2024-05-30 PROCEDURE — 80061 LIPID PANEL: CPT

## 2024-05-30 PROCEDURE — 84466 ASSAY OF TRANSFERRIN: CPT

## 2024-05-30 PROCEDURE — 82306 VITAMIN D 25 HYDROXY: CPT

## 2024-05-30 PROCEDURE — 83540 ASSAY OF IRON: CPT

## 2024-05-30 PROCEDURE — 82043 UR ALBUMIN QUANTITATIVE: CPT

## 2024-05-30 PROCEDURE — 82607 VITAMIN B-12: CPT

## 2024-05-30 PROCEDURE — 80053 COMPREHEN METABOLIC PANEL: CPT

## 2024-05-30 PROCEDURE — 84443 ASSAY THYROID STIM HORMONE: CPT

## 2024-05-30 PROCEDURE — 36415 COLL VENOUS BLD VENIPUNCTURE: CPT

## 2024-05-30 PROCEDURE — 85025 COMPLETE CBC W/AUTO DIFF WBC: CPT

## 2024-05-30 PROCEDURE — 82746 ASSAY OF FOLIC ACID SERUM: CPT

## 2024-07-02 ENCOUNTER — OFFICE VISIT (OUTPATIENT)
Dept: FAMILY MEDICINE CLINIC | Facility: CLINIC | Age: 69
End: 2024-07-02
Payer: MEDICARE

## 2024-07-02 VITALS
HEIGHT: 63 IN | WEIGHT: 210 LBS | DIASTOLIC BLOOD PRESSURE: 48 MMHG | BODY MASS INDEX: 37.21 KG/M2 | OXYGEN SATURATION: 96 % | HEART RATE: 78 BPM | SYSTOLIC BLOOD PRESSURE: 130 MMHG

## 2024-07-02 DIAGNOSIS — Z00.00 MEDICARE ANNUAL WELLNESS VISIT, SUBSEQUENT: Primary | ICD-10-CM

## 2024-07-02 PROCEDURE — 1160F RVW MEDS BY RX/DR IN RCRD: CPT | Performed by: NURSE PRACTITIONER

## 2024-07-02 PROCEDURE — 3078F DIAST BP <80 MM HG: CPT | Performed by: NURSE PRACTITIONER

## 2024-07-02 PROCEDURE — 1170F FXNL STATUS ASSESSED: CPT | Performed by: NURSE PRACTITIONER

## 2024-07-02 PROCEDURE — 3044F HG A1C LEVEL LT 7.0%: CPT | Performed by: NURSE PRACTITIONER

## 2024-07-02 PROCEDURE — 3075F SYST BP GE 130 - 139MM HG: CPT | Performed by: NURSE PRACTITIONER

## 2024-07-02 PROCEDURE — 1159F MED LIST DOCD IN RCRD: CPT | Performed by: NURSE PRACTITIONER

## 2024-07-02 PROCEDURE — G0439 PPPS, SUBSEQ VISIT: HCPCS | Performed by: NURSE PRACTITIONER

## 2024-07-02 PROCEDURE — 1126F AMNT PAIN NOTED NONE PRSNT: CPT | Performed by: NURSE PRACTITIONER

## 2024-07-02 NOTE — PROGRESS NOTES
The ABCs of the Annual Wellness Visit  Subsequent Medicare Wellness Visit    Subjective      Adelita Garcia is a 68 y.o. female who presents for a Subsequent Medicare Wellness Visit.    The following portions of the patient's history were reviewed and   updated as appropriate: allergies, current medications, past family history, past medical history, past social history, past surgical history, and problem list.    Compared to one year ago, the patient feels her physical   health is better.    Compared to one year ago, the patient feels her mental   health is better.    Recent Hospitalizations:  She was not admitted to the hospital during the last year.       Current Medical Providers:  Patient Care Team:  Nesha Gomez APRN as PCP - General (Nurse Practitioner)    Outpatient Medications Prior to Visit   Medication Sig Dispense Refill    aspirin 81 MG EC tablet Take 1 tablet by mouth Daily. 90 tablet 1    baclofen (LIORESAL) 10 MG tablet Take 1 tablet by mouth Daily. 90 tablet 1    buPROPion XL (WELLBUTRIN XL) 150 MG 24 hr tablet Take 1 tablet by mouth Daily. 90 tablet 1    Cholecalciferol 50 MCG (2000 UT) tablet Vitamin D3 2,000 unit oral tablet take 1 tablet by oral route daily   Active      diclofenac (VOLTAREN) 75 MG EC tablet Take 1 tablet by mouth 2 (Two) Times a Day. 180 tablet 1    Diclofenac Sodium (VOLTAREN) 1 % gel gel Apply 4 g topically to the appropriate area as directed 4 (Four) Times a Day As Needed (JOINT PAIN). 350 g 0    FeroSul 325 (65 Fe) MG tablet Take 1 tablet by mouth 2 (Two) Times a Day. 180 tablet 1    FLUoxetine (PROzac) 40 MG capsule Take 1 capsule by mouth Every Morning. 90 capsule 1    losartan-hydrochlorothiazide (HYZAAR) 100-25 MG per tablet Take 1 tablet by mouth Daily. 90 tablet 1    metFORMIN (GLUCOPHAGE) 1000 MG tablet Take 1 tablet by mouth 2 (Two) Times a Day With Meals. 180 tablet 1    pravastatin (PRAVACHOL) 20 MG tablet Take 1 tablet by mouth Daily. 90 tablet 1    traZODone  "(DESYREL) 50 MG tablet Take 1 tablet by mouth Every Night. 90 tablet 1     No facility-administered medications prior to visit.       No opioid medication identified on active medication list. I have reviewed chart for other potential  high risk medication/s and harmful drug interactions in the elderly.        Aspirin is on active medication list. Aspirin use is indicated based on review of current medical condition/s. Pros and cons of this therapy have been discussed today. Benefits of this medication outweigh potential harm.  Patient has been encouraged to continue taking this medication.  .      Patient Active Problem List   Diagnosis    Anemia    Anxiety disorder    Arthritis    Essential hypertension    History of total knee replacement    Hyperlipidemia    Hypertriglyceridemia    Insomnia    Joint pain    Major depressive disorder    Vitamin D deficiency    Osteoarthritis    Type 2 diabetes mellitus without complication    Encounter for screening for malignant neoplasm of colon     Advance Care Planning   Advance Care Planning     Advance Directive is not on file.  ACP discussion was held with the patient during this visit. Patient does not have an advance directive, declines further assistance.     Objective    Vitals:    07/02/24 1132   BP: 130/48   Pulse: 78   SpO2: 96%   Weight: 95.3 kg (210 lb)   Height: 160 cm (63\")   PainSc: 0-No pain     Estimated body mass index is 37.2 kg/m² as calculated from the following:    Height as of this encounter: 160 cm (63\").    Weight as of this encounter: 95.3 kg (210 lb).           Does the patient have evidence of cognitive impairment?   No    Lab Results   Component Value Date    TRIG 159 (H) 05/30/2024    HDL 41 05/30/2024     (H) 05/30/2024    VLDL 29 05/30/2024          HEALTH RISK ASSESSMENT    Smoking Status:  Social History     Tobacco Use   Smoking Status Never    Passive exposure: Never   Smokeless Tobacco Never     Alcohol Consumption:  Social History "     Substance and Sexual Activity   Alcohol Use Never     Fall Risk Screen:    SLIMADI Fall Risk Assessment was completed, and patient is at LOW risk for falls.Assessment completed on:2024    Depression Screenin/2/2024    11:00 AM   PHQ-2/PHQ-9 Depression Screening   Little Interest or Pleasure in Doing Things 0-->not at all   Feeling Down, Depressed or Hopeless 0-->not at all   PHQ-9: Brief Depression Severity Measure Score 0       Health Habits and Functional and Cognitive Screenin/2/2024    11:00 AM   Functional & Cognitive Status   Do you have difficulty preparing food and eating? No   Do you have difficulty bathing yourself, getting dressed or grooming yourself? No   Do you have difficulty using the toilet? No   Do you have difficulty moving around from place to place? No   Do you have trouble with steps or getting out of a bed or a chair? No   Current Diet Well Balanced Diet   Dental Exam Up to date   Eye Exam Up to date   Exercise (times per week) 3 times per week   Current Exercises Include Gardening;House Cleaning   Do you need help using the phone?  No   Are you deaf or do you have serious difficulty hearing?  No   Do you need help to go to places out of walking distance? No   Do you need help shopping? No   Do you need help preparing meals?  No   Do you need help with housework?  No   Do you need help with laundry? No   Do you need help taking your medications? No   Do you need help managing money? No   Do you ever drive or ride in a car without wearing a seat belt? No   Have you felt unusual stress, anger or loneliness in the last month? No   Who do you live with? Spouse   If you need help, do you have trouble finding someone available to you? No   Have you been bothered in the last four weeks by sexual problems? No   Do you have difficulty concentrating, remembering or making decisions? No       Age-appropriate Screening Schedule:  Refer to the list below for future screening  recommendations based on patient's age, sex and/or medical conditions. Orders for these recommended tests are listed in the plan section. The patient has been provided with a written plan.    Health Maintenance   Topic Date Due    RSV Vaccine - Adults (1 - 1-dose 60+ series) Never done    DXA SCAN  07/27/2024    HEMOGLOBIN A1C  09/27/2024    COVID-19 Vaccine (7 - 2023-24 season) 08/04/2024 (Originally 2/29/2024)    INFLUENZA VACCINE  08/01/2024    DIABETIC EYE EXAM  09/25/2024    DIABETIC FOOT EXAM  11/15/2024    BMI FOLLOWUP  05/15/2025    LIPID PANEL  05/30/2025    URINE MICROALBUMIN  05/30/2025    ANNUAL WELLNESS VISIT  07/02/2025    MAMMOGRAM  08/28/2025    TDAP/TD VACCINES (2 - Td or Tdap) 05/09/2027    COLORECTAL CANCER SCREENING  08/15/2033    HEPATITIS C SCREENING  Completed    Pneumococcal Vaccine 65+  Completed    ZOSTER VACCINE  Completed                  CMS Preventative Services Quick Reference  Risk Factors Identified During Encounter:    None Identified    The above risks/problems have been discussed with the patient.  Pertinent information has been shared with the patient in the After Visit Summary.    Diagnoses and all orders for this visit:    1. Medicare annual wellness visit, subsequent (Primary)        Follow Up:   Next Medicare Wellness visit to be scheduled in 1 year.      An After Visit Summary and PPPS were made available to the patient.

## 2024-08-14 ENCOUNTER — PATIENT MESSAGE (OUTPATIENT)
Dept: FAMILY MEDICINE CLINIC | Facility: CLINIC | Age: 69
End: 2024-08-14
Payer: MEDICARE

## 2024-08-15 RX ORDER — TRAZODONE HYDROCHLORIDE 100 MG/1
100 TABLET ORAL NIGHTLY
Qty: 90 TABLET | Refills: 1 | Status: SHIPPED | OUTPATIENT
Start: 2024-08-15

## 2024-08-15 RX ORDER — TRAZODONE HYDROCHLORIDE 100 MG/1
100 TABLET ORAL NIGHTLY
Qty: 90 TABLET | Refills: 1 | Status: SHIPPED | OUTPATIENT
Start: 2024-08-15 | End: 2024-08-15 | Stop reason: SDUPTHER

## 2024-08-15 NOTE — TELEPHONE ENCOUNTER
From: Adelita Garcia  To: Nesha Gomez  Sent: 8/14/2024 10:45 PM EDT  Subject: Trazadone    Pardeep Jeffries. I’ve started taking 2 trazadone before bed. Can you send a prescription to [facility] for me? Thank you!

## 2024-08-30 ENCOUNTER — HOSPITAL ENCOUNTER (OUTPATIENT)
Dept: MAMMOGRAPHY | Facility: HOSPITAL | Age: 69
Discharge: HOME | End: 2024-08-30
Payer: MEDICARE

## 2024-08-30 ENCOUNTER — HOSPITAL ENCOUNTER (OUTPATIENT)
Dept: BONE DENSITY | Facility: HOSPITAL | Age: 69
Discharge: HOME | End: 2024-08-30
Payer: MEDICARE

## 2024-08-30 DIAGNOSIS — Z12.31 ENCOUNTER FOR SCREENING MAMMOGRAM FOR MALIGNANT NEOPLASM OF BREAST: ICD-10-CM

## 2024-08-30 PROCEDURE — 77067 SCR MAMMO BI INCL CAD: CPT

## 2024-08-30 PROCEDURE — 77063 BREAST TOMOSYNTHESIS BI: CPT

## 2024-08-30 PROCEDURE — 77080 DXA BONE DENSITY AXIAL: CPT

## 2024-09-05 ENCOUNTER — OFFICE VISIT (OUTPATIENT)
Dept: FAMILY MEDICINE CLINIC | Facility: CLINIC | Age: 69
End: 2024-09-05
Payer: MEDICARE

## 2024-09-05 VITALS
BODY MASS INDEX: 36.32 KG/M2 | OXYGEN SATURATION: 97 % | HEART RATE: 90 BPM | SYSTOLIC BLOOD PRESSURE: 129 MMHG | HEIGHT: 63 IN | DIASTOLIC BLOOD PRESSURE: 53 MMHG | WEIGHT: 205 LBS

## 2024-09-05 DIAGNOSIS — R09.81 NASAL CONGESTION: ICD-10-CM

## 2024-09-05 DIAGNOSIS — J34.89 SINUS PRESSURE: ICD-10-CM

## 2024-09-05 DIAGNOSIS — J01.01 ACUTE RECURRENT MAXILLARY SINUSITIS: Primary | ICD-10-CM

## 2024-09-05 PROCEDURE — 1160F RVW MEDS BY RX/DR IN RCRD: CPT | Performed by: NURSE PRACTITIONER

## 2024-09-05 PROCEDURE — 3074F SYST BP LT 130 MM HG: CPT | Performed by: NURSE PRACTITIONER

## 2024-09-05 PROCEDURE — 3078F DIAST BP <80 MM HG: CPT | Performed by: NURSE PRACTITIONER

## 2024-09-05 PROCEDURE — 3044F HG A1C LEVEL LT 7.0%: CPT | Performed by: NURSE PRACTITIONER

## 2024-09-05 PROCEDURE — 99213 OFFICE O/P EST LOW 20 MIN: CPT | Performed by: NURSE PRACTITIONER

## 2024-09-05 PROCEDURE — 1126F AMNT PAIN NOTED NONE PRSNT: CPT | Performed by: NURSE PRACTITIONER

## 2024-09-05 PROCEDURE — 1159F MED LIST DOCD IN RCRD: CPT | Performed by: NURSE PRACTITIONER

## 2024-09-05 RX ORDER — AZITHROMYCIN 250 MG/1
TABLET, FILM COATED ORAL
Qty: 6 TABLET | Refills: 0 | Status: SHIPPED | OUTPATIENT
Start: 2024-09-05

## 2024-09-05 NOTE — PROGRESS NOTES
Chief Complaint  Nasal Congestion and sinus pressure    Subjective            Adelita Garcia presents to [facility]  History of Present Illness  Pt has c/o possible sinus infection. Pt states she has had nasal congestion, drainage - yellow in color, and sinus pressure. Pt states this has been present for 2-3 wks. Pt has taken sudafed, and mucinex with little relief. Pt reports she tested negative for covid at home and declines covid/flu test in office.  She denies any fever or cough.        Past Medical History:   Diagnosis Date    Abnormal bone density screening 10/17/2019    NORMAL    Anemia     Anxiety disorder     Arthritis 2015    Cataract     Removed    Diabetes mellitus     TYPE 2    Diabetic eye exam 2019    SCANNED IN CHART    Essential hypertension     Eye exam, routine 2019    YEARLY EYE EXAM    Hyperlipidemia     Hypertriglyceridemia 09/15/2017    Insomnia 2015    Joint pain     Major depressive disorder 2018    Pap smear for cervical cancer screening     NO LONGER    Visit for screening mammogram 01/15/2020    NORMAL    Vitamin D deficiency 2018       No Known Allergies     Past Surgical History:   Procedure Laterality Date    BREAST SURGERY      Benign breast biopsies     SECTION      COLONOSCOPY  2012    -NORMAL REPEAT IN 10 YEARS     COLONOSCOPY N/A 8/15/2023    Procedure: COLONOSCOPY;  Surgeon: Hamilton Harris MD;  Location: [facility] ENDOSCOPY;  Service: Gastroenterology;  Laterality: N/A;  NORMAL COLONOSCOPY    DILATION AND CURETTAGE, DIAGNOSTIC / THERAPEUTIC  ,    EYE SURGERY      HYSTERECTOMY      JOINT REPLACEMENT      TUBAL ABDOMINAL LIGATION          Social History     Tobacco Use    Smoking status: Never     Passive exposure: Never    Smokeless tobacco: Never   Substance Use Topics    Alcohol use: Never       Family History   Problem Relation Age of Onset     "Stroke Mother     Heart disease Mother         Bypass age 83.  11 age 96    Hypertension Mother     Heart disease Father         Abdominal aneurysm.  after surgery age 69    Hypertension Father     Heart disease Brother         Heart attacks mid 40’s.    Arthritis Brother     Heart disease Brother         Cardiac bypass age 70. Current age 79.    Colon cancer Neg Hx         Current Outpatient Medications on File Prior to Visit   Medication Sig    aspirin 81 MG EC tablet Take 1 tablet by mouth Daily.    baclofen (LIORESAL) 10 MG tablet Take 1 tablet by mouth Daily.    buPROPion XL (WELLBUTRIN XL) 150 MG 24 hr tablet Take 1 tablet by mouth Daily.    Cholecalciferol 50 MCG ( UT) tablet Vitamin D3 2,000 unit oral tablet take 1 tablet by oral route daily   Active    diclofenac (VOLTAREN) 75 MG EC tablet Take 1 tablet by mouth 2 (Two) Times a Day.    Diclofenac Sodium (VOLTAREN) 1 % gel gel Apply 4 g topically to the appropriate area as directed 4 (Four) Times a Day As Needed (JOINT PAIN).    FeroSul 325 (65 Fe) MG tablet Take 1 tablet by mouth 2 (Two) Times a Day.    FLUoxetine (PROzac) 40 MG capsule Take 1 capsule by mouth Every Morning.    losartan-hydrochlorothiazide (HYZAAR) 100-25 MG per tablet Take 1 tablet by mouth Daily.    metFORMIN (GLUCOPHAGE) 1000 MG tablet Take 1 tablet by mouth 2 (Two) Times a Day With Meals.    pravastatin (PRAVACHOL) 20 MG tablet Take 1 tablet by mouth Daily.    traZODone (DESYREL) 100 MG tablet Take 1 tablet by mouth Every Night.     No current facility-administered medications on file prior to visit.       Health Maintenance Due   Topic Date Due    COVID-19 Vaccine ( season) 2024    INFLUENZA VACCINE  2024    DIABETIC EYE EXAM  2024    HEMOGLOBIN A1C  2024       Objective     /53   Pulse 90   Ht 160 cm (63\")   Wt 93 kg (205 lb)   SpO2 97%   BMI 36.31 kg/m²       Physical Exam  Constitutional:       General: She is not in acute " distress.     Appearance: Normal appearance. She is not ill-appearing.   HENT:      Head: Normocephalic and atraumatic.      Right Ear: Tympanic membrane, ear canal and external ear normal.      Left Ear: Tympanic membrane, ear canal and external ear normal.      Nose: Nose normal.      Mouth/Throat:      Lips: Pink.      Mouth: Mucous membranes are moist.      Pharynx: No pharyngeal swelling, oropharyngeal exudate, posterior oropharyngeal erythema or uvula swelling.      Tonsils: No tonsillar exudate or tonsillar abscesses.      Comments: Frontal and maxillary sinus tenderness upon palpation  Cardiovascular:      Rate and Rhythm: Normal rate and regular rhythm.      Heart sounds: Normal heart sounds. No murmur heard.  Pulmonary:      Effort: Pulmonary effort is normal. No respiratory distress.      Breath sounds: Normal breath sounds.   Chest:      Chest wall: No tenderness.   Musculoskeletal:         General: No swelling or tenderness. Normal range of motion.      Cervical back: Normal range of motion and neck supple.   Skin:     General: Skin is warm and dry.      Findings: No rash.   Neurological:      General: No focal deficit present.      Mental Status: She is alert and oriented to person, place, and time. Mental status is at baseline.      Gait: Gait normal.   Psychiatric:         Mood and Affect: Mood normal.         Behavior: Behavior normal.         Thought Content: Thought content normal.         Judgment: Judgment normal.           Result Review :                           Assessment and Plan        Diagnoses and all orders for this visit:    1. Acute recurrent maxillary sinusitis (Primary)  -     azithromycin (Zithromax Z-Lebron) 250 MG tablet; Take 2 tablets by mouth on day 1, then 1 tablet daily on days 2-5  Dispense: 6 tablet; Refill: 0    2. Nasal congestion  -     azithromycin (Zithromax Z-Lebron) 250 MG tablet; Take 2 tablets by mouth on day 1, then 1 tablet daily on days 2-5  Dispense: 6 tablet;  Refill: 0    3. Sinus pressure  -     azithromycin (Zithromax Z-Lebron) 250 MG tablet; Take 2 tablets by mouth on day 1, then 1 tablet daily on days 2-5  Dispense: 6 tablet; Refill: 0              Follow Up     Return if symptoms worsen or fail to improve.    Patient was given instructions and counseling regarding her condition or for health maintenance advice. Please see specific information pulled into the AVS if appropriate.     Adelita STACY Garcia  reports that she has never smoked. She has never been exposed to tobacco smoke. She has never used smokeless tobacco.

## 2024-09-30 ENCOUNTER — TELEPHONE (OUTPATIENT)
Dept: FAMILY MEDICINE CLINIC | Facility: CLINIC | Age: 69
End: 2024-09-30
Payer: MEDICARE

## 2024-09-30 NOTE — TELEPHONE ENCOUNTER
----- Message from [facility] [employer] sent at 9/28/2024 11:13 PM EDT -----  Regarding: Appointment Request ()  Contact: [phone]  Appointment Request From: Adelita Garcia    With Provider: Nesha Gomez [[facility]]    Preferred Date Range: Any date 10/14/2024 or later    Preferred Times: Any    Reason: To address the following health maintenance concerns.  Covid-19 Vaccine  Influenza Vaccine    Comments:  Red rash on nose

## 2024-10-17 ENCOUNTER — TELEPHONE (OUTPATIENT)
Dept: FAMILY MEDICINE CLINIC | Facility: CLINIC | Age: 69
End: 2024-10-17
Payer: MEDICARE

## 2024-10-17 NOTE — TELEPHONE ENCOUNTER
VMTCB @ 219 to pt. Is pt aware of this company reaching out?     Will need release of records signed by pt from company before releasing any records.

## 2024-10-17 NOTE — TELEPHONE ENCOUNTER
Caller: [facility]  [facility]    Relationship:     Best call back number: [phone]     What is the best time to reach you: ANYTIME     Who are you requesting to speak with (clinical staff, provider,  specific staff member): CLUNIAL     What was the call regarding: [facility] IS CALLING TO CONFIRM AND VERIFY SOME RECENT TESTING AND LABS PATIENT HAD COMPLETED CMP AND COMPLETED FOR THIS YEAR.

## 2024-11-11 DIAGNOSIS — I10 ESSENTIAL HYPERTENSION: ICD-10-CM

## 2024-11-11 DIAGNOSIS — E78.1 HYPERTRIGLYCERIDEMIA: ICD-10-CM

## 2024-11-11 RX ORDER — LOSARTAN POTASSIUM AND HYDROCHLOROTHIAZIDE 25; 100 MG/1; MG/1
1 TABLET ORAL DAILY
Qty: 90 TABLET | Refills: 1 | Status: SHIPPED | OUTPATIENT
Start: 2024-11-11

## 2024-11-11 RX ORDER — PRAVASTATIN SODIUM 20 MG
20 TABLET ORAL DAILY
Qty: 90 TABLET | Refills: 1 | Status: SHIPPED | OUTPATIENT
Start: 2024-11-11

## 2024-11-18 ENCOUNTER — OFFICE VISIT (OUTPATIENT)
Dept: FAMILY MEDICINE CLINIC | Facility: CLINIC | Age: 69
End: 2024-11-18
Payer: MEDICARE

## 2024-11-18 VITALS
WEIGHT: 202.6 LBS | DIASTOLIC BLOOD PRESSURE: 56 MMHG | OXYGEN SATURATION: 97 % | BODY MASS INDEX: 35.89 KG/M2 | HEART RATE: 80 BPM | SYSTOLIC BLOOD PRESSURE: 139 MMHG

## 2024-11-18 DIAGNOSIS — F33.0 MAJOR DEPRESSIVE DISORDER, RECURRENT, MILD: ICD-10-CM

## 2024-11-18 DIAGNOSIS — M19.90 ARTHRITIS: ICD-10-CM

## 2024-11-18 DIAGNOSIS — E11.9 TYPE 2 DIABETES MELLITUS WITHOUT COMPLICATION, WITHOUT LONG-TERM CURRENT USE OF INSULIN: ICD-10-CM

## 2024-11-18 DIAGNOSIS — Z13.29 SCREENING FOR THYROID DISORDER: ICD-10-CM

## 2024-11-18 DIAGNOSIS — D64.9 ANEMIA, UNSPECIFIED TYPE: ICD-10-CM

## 2024-11-18 DIAGNOSIS — E55.9 VITAMIN D DEFICIENCY: ICD-10-CM

## 2024-11-18 DIAGNOSIS — Z82.49 FAMILY HISTORY OF HEART DISEASE: ICD-10-CM

## 2024-11-18 DIAGNOSIS — E78.5 HYPERLIPIDEMIA, UNSPECIFIED HYPERLIPIDEMIA TYPE: ICD-10-CM

## 2024-11-18 DIAGNOSIS — I10 PRIMARY HYPERTENSION: ICD-10-CM

## 2024-11-18 DIAGNOSIS — F51.01 PRIMARY INSOMNIA: ICD-10-CM

## 2024-11-18 DIAGNOSIS — F41.9 ANXIETY: Primary | ICD-10-CM

## 2024-11-18 PROCEDURE — 3075F SYST BP GE 130 - 139MM HG: CPT | Performed by: NURSE PRACTITIONER

## 2024-11-18 PROCEDURE — 3078F DIAST BP <80 MM HG: CPT | Performed by: NURSE PRACTITIONER

## 2024-11-18 PROCEDURE — 1159F MED LIST DOCD IN RCRD: CPT | Performed by: NURSE PRACTITIONER

## 2024-11-18 PROCEDURE — 99214 OFFICE O/P EST MOD 30 MIN: CPT | Performed by: NURSE PRACTITIONER

## 2024-11-18 PROCEDURE — 3044F HG A1C LEVEL LT 7.0%: CPT | Performed by: NURSE PRACTITIONER

## 2024-11-18 PROCEDURE — 1126F AMNT PAIN NOTED NONE PRSNT: CPT | Performed by: NURSE PRACTITIONER

## 2024-11-18 PROCEDURE — 1160F RVW MEDS BY RX/DR IN RCRD: CPT | Performed by: NURSE PRACTITIONER

## 2024-11-18 RX ORDER — BUPROPION HYDROCHLORIDE 150 MG/1
150 TABLET ORAL DAILY
Qty: 90 TABLET | Refills: 1 | Status: SHIPPED | OUTPATIENT
Start: 2024-11-18

## 2024-11-18 RX ORDER — FERROUS SULFATE 325(65) MG
1 TABLET ORAL 2 TIMES DAILY
Qty: 180 TABLET | Refills: 1 | Status: SHIPPED | OUTPATIENT
Start: 2024-11-18

## 2024-11-18 RX ORDER — FLUOXETINE 40 MG/1
40 CAPSULE ORAL EVERY MORNING
Qty: 90 CAPSULE | Refills: 1 | Status: SHIPPED | OUTPATIENT
Start: 2024-11-18

## 2024-11-18 RX ORDER — TRAZODONE HYDROCHLORIDE 100 MG/1
100 TABLET ORAL NIGHTLY
Qty: 90 TABLET | Refills: 1 | Status: SHIPPED | OUTPATIENT
Start: 2024-11-18

## 2024-11-18 RX ORDER — DICLOFENAC SODIUM 75 MG/1
75 TABLET, DELAYED RELEASE ORAL 2 TIMES DAILY
Qty: 180 TABLET | Refills: 1 | Status: SHIPPED | OUTPATIENT
Start: 2024-11-18

## 2024-11-18 NOTE — PROGRESS NOTES
Chief Complaint  Anxiety, Depression, Anemia, HTN, DM 2, HLD Insomnia, Vit D def, arthritis    Subjective            Adelita Garcia presents to [facility]  History of Present Illness  Pt here for f/u on Anxiety, Depression, Anemia, HTN, DM 2, arthritis, HLD Insomnia and Vit D def. No issues or concerns at this time.     Pt does request a referral to Cardiology for a full work up she reports family hx of heart disease in her brothers and father, one brother had an MI at age 40.  She is having no issues at this time and BP is well controlled.  Will place referral per pt request.    PT is due labs/orders placed.     PT is due diabetic eye exam.  PT given form to self schedule.    PT due diabetic foot exam, will perform in office today.          Past Medical History:   Diagnosis Date    Abnormal bone density screening 10/17/2019    NORMAL    Anemia     Anxiety disorder     Arthritis 2015    Cataract     Removed    Diabetes mellitus     TYPE 2    Diabetic eye exam 2019    SCANNED IN CHART    Essential hypertension     Eye exam, routine 2019    YEARLY EYE EXAM    Hyperlipidemia     Hypertriglyceridemia 09/15/2017    Insomnia 2015    Joint pain     Major depressive disorder 2018    Pap smear for cervical cancer screening     NO LONGER    Visit for screening mammogram 01/15/2020    NORMAL    Vitamin D deficiency 2018       No Known Allergies     Past Surgical History:   Procedure Laterality Date    BREAST SURGERY      Benign breast biopsies     SECTION  ,    COLONOSCOPY  2012    -NORMAL REPEAT IN 10 YEARS     COLONOSCOPY N/A 8/15/2023    Procedure: COLONOSCOPY;  Surgeon: Hamilton Harris MD;  Location: [facility] ENDOSCOPY;  Service: Gastroenterology;  Laterality: N/A;  NORMAL COLONOSCOPY    DILATION AND CURETTAGE, DIAGNOSTIC / THERAPEUTIC      EYE SURGERY      HYSTERECTOMY      JOINT REPLACEMENT       TUBAL ABDOMINAL LIGATION          Social History     Tobacco Use    Smoking status: Never     Passive exposure: Never    Smokeless tobacco: Never   Substance Use Topics    Alcohol use: Never       Family History   Problem Relation Age of Onset    Stroke Mother     Heart disease Mother         Bypass age 83.  11-22 age 96    Hypertension Mother     Heart disease Father         Abdominal aneurysm.  after surgery age 69    Hypertension Father     Heart disease Brother         Heart attacks mid 40’s.    Arthritis Brother     Heart disease Brother         Cardiac bypass age 70. Current age 79.    Colon cancer Neg Hx         Current Outpatient Medications on File Prior to Visit   Medication Sig    aspirin 81 MG EC tablet Take 1 tablet by mouth Daily.    baclofen (LIORESAL) 10 MG tablet Take 1 tablet by mouth Daily.    Cholecalciferol 50 MCG (2000 UT) tablet Vitamin D3 2,000 unit oral tablet take 1 tablet by oral route daily   Active    Diclofenac Sodium (VOLTAREN) 1 % gel gel Apply 4 g topically to the appropriate area as directed 4 (Four) Times a Day As Needed (JOINT PAIN).    losartan-hydrochlorothiazide (HYZAAR) 100-25 MG per tablet TAKE 1 TABLET DAILY    pravastatin (PRAVACHOL) 20 MG tablet TAKE 1 TABLET DAILY    [DISCONTINUED] buPROPion XL (WELLBUTRIN XL) 150 MG 24 hr tablet Take 1 tablet by mouth Daily.    [DISCONTINUED] diclofenac (VOLTAREN) 75 MG EC tablet Take 1 tablet by mouth 2 (Two) Times a Day.    [DISCONTINUED] FeroSul 325 (65 Fe) MG tablet Take 1 tablet by mouth 2 (Two) Times a Day.    [DISCONTINUED] FLUoxetine (PROzac) 40 MG capsule Take 1 capsule by mouth Every Morning.    [DISCONTINUED] metFORMIN (GLUCOPHAGE) 1000 MG tablet Take 1 tablet by mouth 2 (Two) Times a Day With Meals.    [DISCONTINUED] traZODone (DESYREL) 100 MG tablet Take 1 tablet by mouth Every Night.    [DISCONTINUED] azithromycin (Zithromax Z-Lebron) 250 MG tablet Take 2 tablets by mouth on day 1, then 1 tablet daily on days  2-5 (Patient not taking: Reported on 11/18/2024)     No current facility-administered medications on file prior to visit.       Health Maintenance Due   Topic Date Due    HEMOGLOBIN A1C  09/27/2024    DIABETIC FOOT EXAM  11/15/2024       Objective     /56   Pulse 80   Wt 91.9 kg (202 lb 9.6 oz)   SpO2 97%   BMI 35.89 kg/m²       Physical Exam  Constitutional:       General: She is not in acute distress.     Appearance: Normal appearance. She is not ill-appearing.   HENT:      Head: Normocephalic and atraumatic.   Neck:      Thyroid: No thyroid mass, thyromegaly or thyroid tenderness.   Cardiovascular:      Rate and Rhythm: Normal rate and regular rhythm.      Pulses:           Dorsalis pedis pulses are 2+ on the right side and 2+ on the left side.      Heart sounds: Normal heart sounds. No murmur heard.  Pulmonary:      Effort: Pulmonary effort is normal. No respiratory distress.      Breath sounds: Normal breath sounds.   Chest:      Chest wall: No tenderness.   Abdominal:      General: Abdomen is flat. Bowel sounds are normal. There is no distension.      Palpations: Abdomen is soft. There is no mass.      Tenderness: There is no abdominal tenderness. There is no guarding.   Musculoskeletal:         General: No swelling or tenderness. Normal range of motion.      Cervical back: Normal range of motion and neck supple.   Feet:      Right foot:      Protective Sensation: 3 sites tested.  3 sites sensed.      Skin integrity: Skin integrity normal. No ulcer or blister.      Toenail Condition: Right toenails are normal.      Left foot:      Protective Sensation: 3 sites tested.  3 sites sensed.      Skin integrity: Skin integrity normal. No ulcer or blister.      Toenail Condition: Left toenails are normal.      Comments:      Skin:     General: Skin is warm and dry.      Findings: No rash.   Neurological:      General: No focal deficit present.      Mental Status: She is alert and oriented to person, place,  and time. Mental status is at baseline.      Gait: Gait normal.   Psychiatric:         Attention and Perception: Attention normal.         Mood and Affect: Mood and affect normal.         Speech: Speech normal.         Behavior: Behavior normal. Behavior is cooperative.         Thought Content: Thought content normal. Thought content does not include suicidal ideation.         Judgment: Judgment normal.       Monofilament Test Performed    Result Review :                           Assessment and Plan        Diagnoses and all orders for this visit:    1. Anxiety (Primary)  Comments:  stable on Wellbutrin 150mg and Prozac 40mg, continue  Orders:  -     buPROPion XL (WELLBUTRIN XL) 150 MG 24 hr tablet; Take 1 tablet by mouth Daily.  Dispense: 90 tablet; Refill: 1  -     FLUoxetine (PROzac) 40 MG capsule; Take 1 capsule by mouth Every Morning.  Dispense: 90 capsule; Refill: 1    2. Major depressive disorder, recurrent, mild  Comments:  stable  on Wellbutrin 150mg and Prozac 40mg, continue  Orders:  -     buPROPion XL (WELLBUTRIN XL) 150 MG 24 hr tablet; Take 1 tablet by mouth Daily.  Dispense: 90 tablet; Refill: 1  -     FLUoxetine (PROzac) 40 MG capsule; Take 1 capsule by mouth Every Morning.  Dispense: 90 capsule; Refill: 1    3. Anemia, unspecified type  Comments:  stableon FeroSul 325mg, continue  Orders:  -     CBC w AUTO Differential; Future  -     Iron Profile; Future  -     FeroSul 325 (65 Fe) MG tablet; Take 1 tablet by mouth 2 (Two) Times a Day.  Dispense: 180 tablet; Refill: 1    4. Primary hypertension  Comments:  stable on Hyzaar 100/25mg, continue  Orders:  -     CBC w AUTO Differential; Future  -     Ambulatory Referral to Cardiology    5. Type 2 diabetes mellitus without complication, without long-term current use of insulin  Comments:  stable on Metformin 1000mg, continue  Orders:  -     Hemoglobin A1c; Future  -     metFORMIN (GLUCOPHAGE) 1000 MG tablet; Take 1 tablet by mouth 2 (Two) Times a Day With  Meals.  Dispense: 180 tablet; Refill: 1    6. Hyperlipidemia, unspecified hyperlipidemia type  Comments:  stable on Pravachol 20mg, continue  Orders:  -     Comprehensive metabolic panel; Future  -     Lipid panel; Future    7. Primary insomnia  Comments:  stableon Trazodone 100mg, continue  Orders:  -     traZODone (DESYREL) 100 MG tablet; Take 1 tablet by mouth Every Night.  Dispense: 90 tablet; Refill: 1    8. Vitamin D deficiency  Comments:  stable on Vit D 2000UT, continue  Orders:  -     Vitamin D 25 hydroxy; Future    9. Screening for thyroid disorder  -     TSH; Future    10. Arthritis  Comments:  stable on Voltaren 75mg, continue  Orders:  -     diclofenac (VOLTAREN) 75 MG EC tablet; Take 1 tablet by mouth 2 (Two) Times a Day.  Dispense: 180 tablet; Refill: 1    11. Family history of heart disease  -     Ambulatory Referral to Cardiology              Follow Up     Return in about 6 months (around 5/18/2025).    Patient was given instructions and counseling regarding her condition or for health maintenance advice. Please see specific information pulled into the AVS if appropriate.     Adelita Garcia  reports that she has never smoked. She has never been exposed to tobacco smoke. She has never used smokeless tobacco.   VANESSA Laboy

## 2024-11-20 ENCOUNTER — LAB (OUTPATIENT)
Dept: LAB | Facility: HOSPITAL | Age: 69
End: 2024-11-20
Payer: MEDICARE

## 2024-11-20 DIAGNOSIS — I10 PRIMARY HYPERTENSION: ICD-10-CM

## 2024-11-20 DIAGNOSIS — E78.5 HYPERLIPIDEMIA, UNSPECIFIED HYPERLIPIDEMIA TYPE: ICD-10-CM

## 2024-11-20 DIAGNOSIS — E55.9 VITAMIN D DEFICIENCY: ICD-10-CM

## 2024-11-20 DIAGNOSIS — Z13.29 SCREENING FOR THYROID DISORDER: ICD-10-CM

## 2024-11-20 DIAGNOSIS — D64.9 ANEMIA, UNSPECIFIED TYPE: ICD-10-CM

## 2024-11-20 DIAGNOSIS — E11.9 TYPE 2 DIABETES MELLITUS WITHOUT COMPLICATION, WITHOUT LONG-TERM CURRENT USE OF INSULIN: ICD-10-CM

## 2024-11-20 LAB
BASOPHILS # BLD AUTO: 0.02 10*3/MM3 (ref 0–0.2)
BASOPHILS NFR BLD AUTO: 0.3 % (ref 0–1.5)
DEPRECATED RDW RBC AUTO: 42.6 FL (ref 37–54)
EOSINOPHIL # BLD AUTO: 0.28 10*3/MM3 (ref 0–0.4)
EOSINOPHIL NFR BLD AUTO: 3.7 % (ref 0.3–6.2)
ERYTHROCYTE [DISTWIDTH] IN BLOOD BY AUTOMATED COUNT: 14.1 % (ref 12.3–15.4)
HBA1C MFR BLD: 6.1 % (ref 4.8–5.6)
HCT VFR BLD AUTO: 36.3 % (ref 34–46.6)
HGB BLD-MCNC: 12 G/DL (ref 12–15.9)
IMM GRANULOCYTES # BLD AUTO: 0.02 10*3/MM3 (ref 0–0.05)
IMM GRANULOCYTES NFR BLD AUTO: 0.3 % (ref 0–0.5)
LYMPHOCYTES # BLD AUTO: 1.67 10*3/MM3 (ref 0.7–3.1)
LYMPHOCYTES NFR BLD AUTO: 22.2 % (ref 19.6–45.3)
MCH RBC QN AUTO: 27.8 PG (ref 26.6–33)
MCHC RBC AUTO-ENTMCNC: 33.1 G/DL (ref 31.5–35.7)
MCV RBC AUTO: 84 FL (ref 79–97)
MONOCYTES # BLD AUTO: 0.33 10*3/MM3 (ref 0.1–0.9)
MONOCYTES NFR BLD AUTO: 4.4 % (ref 5–12)
NEUTROPHILS NFR BLD AUTO: 5.2 10*3/MM3 (ref 1.7–7)
NEUTROPHILS NFR BLD AUTO: 69.1 % (ref 42.7–76)
NRBC BLD AUTO-RTO: 0 /100 WBC (ref 0–0.2)
PLATELET # BLD AUTO: 319 10*3/MM3 (ref 140–450)
PMV BLD AUTO: 10.6 FL (ref 6–12)
RBC # BLD AUTO: 4.32 10*6/MM3 (ref 3.77–5.28)
WBC NRBC COR # BLD AUTO: 7.52 10*3/MM3 (ref 3.4–10.8)

## 2024-11-20 PROCEDURE — 82306 VITAMIN D 25 HYDROXY: CPT

## 2024-11-20 PROCEDURE — 85025 COMPLETE CBC W/AUTO DIFF WBC: CPT

## 2024-11-20 PROCEDURE — 80061 LIPID PANEL: CPT

## 2024-11-20 PROCEDURE — 84466 ASSAY OF TRANSFERRIN: CPT

## 2024-11-20 PROCEDURE — 84443 ASSAY THYROID STIM HORMONE: CPT

## 2024-11-20 PROCEDURE — 83540 ASSAY OF IRON: CPT

## 2024-11-20 PROCEDURE — 83036 HEMOGLOBIN GLYCOSYLATED A1C: CPT

## 2024-11-20 PROCEDURE — 80053 COMPREHEN METABOLIC PANEL: CPT

## 2024-11-20 PROCEDURE — 36415 COLL VENOUS BLD VENIPUNCTURE: CPT

## 2024-11-21 LAB
25(OH)D3 SERPL-MCNC: 27.3 NG/ML (ref 30–100)
ALBUMIN SERPL-MCNC: 3.7 G/DL (ref 3.5–5.2)
ALBUMIN/GLOB SERPL: 1.2 G/DL
ALP SERPL-CCNC: 107 U/L (ref 39–117)
ALT SERPL W P-5'-P-CCNC: 22 U/L (ref 1–33)
ANION GAP SERPL CALCULATED.3IONS-SCNC: 10.7 MMOL/L (ref 5–15)
AST SERPL-CCNC: 19 U/L (ref 1–32)
BILIRUB SERPL-MCNC: <0.2 MG/DL (ref 0–1.2)
BUN SERPL-MCNC: 18 MG/DL (ref 8–23)
BUN/CREAT SERPL: 28.1 (ref 7–25)
CALCIUM SPEC-SCNC: 9.2 MG/DL (ref 8.6–10.5)
CHLORIDE SERPL-SCNC: 101 MMOL/L (ref 98–107)
CHOLEST SERPL-MCNC: 173 MG/DL (ref 0–200)
CO2 SERPL-SCNC: 25.3 MMOL/L (ref 22–29)
CREAT SERPL-MCNC: 0.64 MG/DL (ref 0.57–1)
EGFRCR SERPLBLD CKD-EPI 2021: 96.4 ML/MIN/1.73
GLOBULIN UR ELPH-MCNC: 3 GM/DL
GLUCOSE SERPL-MCNC: 100 MG/DL (ref 65–99)
HDLC SERPL-MCNC: 40 MG/DL (ref 40–60)
HOLD SPECIMEN: NORMAL
IRON 24H UR-MRATE: 50 MCG/DL (ref 37–145)
IRON SATN MFR SERPL: 14 % (ref 20–50)
LDLC SERPL CALC-MCNC: 104 MG/DL (ref 0–100)
LDLC/HDLC SERPL: 2.5 {RATIO}
POTASSIUM SERPL-SCNC: 4 MMOL/L (ref 3.5–5.2)
PROT SERPL-MCNC: 6.7 G/DL (ref 6–8.5)
SODIUM SERPL-SCNC: 137 MMOL/L (ref 136–145)
TIBC SERPL-MCNC: 356 MCG/DL (ref 298–536)
TRANSFERRIN SERPL-MCNC: 239 MG/DL (ref 200–360)
TRIGL SERPL-MCNC: 165 MG/DL (ref 0–150)
TSH SERPL DL<=0.05 MIU/L-ACNC: 1.64 UIU/ML (ref 0.27–4.2)
VLDLC SERPL-MCNC: 29 MG/DL (ref 5–40)

## 2024-12-04 ENCOUNTER — OFFICE VISIT (OUTPATIENT)
Dept: CARDIOLOGY | Facility: CLINIC | Age: 69
End: 2024-12-04
Payer: MEDICARE

## 2024-12-04 VITALS
BODY MASS INDEX: 35.79 KG/M2 | WEIGHT: 202 LBS | HEART RATE: 80 BPM | SYSTOLIC BLOOD PRESSURE: 154 MMHG | DIASTOLIC BLOOD PRESSURE: 86 MMHG | HEIGHT: 63 IN

## 2024-12-04 DIAGNOSIS — I10 ESSENTIAL HYPERTENSION: Primary | ICD-10-CM

## 2024-12-04 DIAGNOSIS — E78.2 MIXED HYPERLIPIDEMIA: ICD-10-CM

## 2024-12-04 DIAGNOSIS — R06.09 DYSPNEA ON EFFORT: ICD-10-CM

## 2024-12-04 DIAGNOSIS — Z82.49 FAMILY HISTORY OF EARLY CAD: ICD-10-CM

## 2024-12-04 DIAGNOSIS — E11.9 TYPE 2 DIABETES MELLITUS WITHOUT COMPLICATION, WITHOUT LONG-TERM CURRENT USE OF INSULIN: ICD-10-CM

## 2024-12-04 DIAGNOSIS — Z82.49 FAMILY HISTORY OF AORTIC ANEURYSM: ICD-10-CM

## 2024-12-04 NOTE — PROGRESS NOTES
CARDIOLOGY INITIAL CONSULT       Chief Complaint  Hypertension and Heart Problem    Subjective            Adelita Garcia presents to [facility]  History of Present Illness  The patient comes for a cardiovascular evaluation. She has past medical history of essential hypertension, hyperlipidemia and type 2 diabetes.  The patient denies history of smoking.  She has family history of premature coronary artery disease and aortic aneurysm (father and uncles, age 40).  She denies angina or palpitation.  She have minimal exertional dyspnea but is better now after she lost weight.  EKG showed normal sinus rhythm with repolarization abnormalities.  Her latest lipid showed an LDL of 173, HDL of 40 ,  and LDL of 104.   .    Past History:    Medical History:  Past Medical History:   Diagnosis Date    Abnormal bone density screening 10/17/2019    NORMAL    Anemia     Anxiety disorder     Arthritis 2015    Cataract     Removed    Diabetes mellitus     TYPE 2    Diabetic eye exam 2019    SCANNED IN CHART    Essential hypertension     Eye exam, routine 2019    YEARLY EYE EXAM    Hyperlipidemia     Hypertriglyceridemia 09/15/2017    Insomnia 2015    Joint pain     Major depressive disorder 2018    Pap smear for cervical cancer screening     NO LONGER    Visit for screening mammogram 01/15/2020    NORMAL    Vitamin D deficiency 2018       Surgical History: has a past surgical history that includes  section (,); Colonoscopy (2012); Dilation and curettage, diagnostic / therapeutic (,); Hysterectomy (); Eye surgery (); Joint replacement (); Tubal ligation (); Breast surgery; and Colonoscopy (N/A, 8/15/2023).     Family History: family history includes Arthritis in her brother; Heart disease in her brother, brother, father, and mother; Hypertension in her father and mother; Stroke in her mother.     Social History:  "reports that she has never smoked. She has never been exposed to tobacco smoke. She has never used smokeless tobacco. She reports that she does not drink alcohol and does not use drugs.    Allergies: Patient has no known allergies.    Current Outpatient Medications on File Prior to Visit   Medication Sig    aspirin 81 MG EC tablet Take 1 tablet by mouth Daily.    baclofen (LIORESAL) 10 MG tablet Take 1 tablet by mouth Daily.    buPROPion XL (WELLBUTRIN XL) 150 MG 24 hr tablet Take 1 tablet by mouth Daily.    Cholecalciferol 50 MCG (2000 UT) tablet Vitamin D3 2,000 unit oral tablet take 1 tablet by oral route daily   Active    diclofenac (VOLTAREN) 75 MG EC tablet Take 1 tablet by mouth 2 (Two) Times a Day.    Diclofenac Sodium (VOLTAREN) 1 % gel gel Apply 4 g topically to the appropriate area as directed 4 (Four) Times a Day As Needed (JOINT PAIN).    FeroSul 325 (65 Fe) MG tablet Take 1 tablet by mouth 2 (Two) Times a Day.    FLUoxetine (PROzac) 40 MG capsule Take 1 capsule by mouth Every Morning.    losartan-hydrochlorothiazide (HYZAAR) 100-25 MG per tablet TAKE 1 TABLET DAILY    metFORMIN (GLUCOPHAGE) 1000 MG tablet Take 1 tablet by mouth 2 (Two) Times a Day With Meals.    pravastatin (PRAVACHOL) 20 MG tablet TAKE 1 TABLET DAILY    traZODone (DESYREL) 100 MG tablet Take 1 tablet by mouth Every Night.     No current facility-administered medications on file prior to visit.          Review of Systems : all systems were reviewed and negative.     Objective     /86 (BP Location: Left arm)   Pulse 80   Ht 160 cm (63\")   Wt 91.6 kg (202 lb)   BMI 35.78 kg/m²       Physical Exam    Alert, oriented  Neck: No JVD  Heart. Regular, no gallops, no rubs, no murmurs.  Lungs: No rales, no wheezing  Abdomen: soft, no massess, bs +  LE: no edema, good pedal pulses.  Neurologic. No motor deficits.   Result Review :     The following data was reviewed by: Diego Cintron MD on 12/04/2024:    CMP          5/30/2024    " 10:01 11/20/2024    11:33   CMP   Glucose 105  100    BUN 16  18    Creatinine 0.61  0.64    EGFR 98.1  96.4    Sodium 138  137    Potassium 4.0  4.0    Chloride 99  101    Calcium 9.3  9.2    Total Protein 6.7  6.7    Albumin 3.8  3.7    Globulin 2.9  3.0    Total Bilirubin 0.2  <0.2    Alkaline Phosphatase 103  107    AST (SGOT) 11  19    ALT (SGPT) 21  22    Albumin/Globulin Ratio 1.3  1.2    BUN/Creatinine Ratio 26.2  28.1    Anion Gap 13.7  10.7      CBC          5/30/2024    10:01 11/20/2024    11:33   CBC   WBC 8.29  7.52    RBC 4.39  4.32    Hemoglobin 12.1  12.0    Hematocrit 36.8  36.3    MCV 83.8  84.0    MCH 27.6  27.8    MCHC 32.9  33.1    RDW 14.5  14.1    Platelets 353  319      TSH          5/30/2024    10:01 11/20/2024    11:33   TSH   TSH 1.620  1.640      Lipid Panel          5/30/2024    10:01 11/20/2024    11:33   Lipid Panel   Total Cholesterol 202  173    Triglycerides 159  165    HDL Cholesterol 41  40    VLDL Cholesterol 29  29    LDL Cholesterol  132  104    LDL/HDL Ratio 3.15  2.50         Data reviewed: Cardiology studies              ECG 12 Lead    Date/Time: 12/4/2024 11:10 AM  Performed by: Diego Valdez MD    Authorized by: Diego Valdez MD  Comparison: not compared with previous ECG   Rhythm: sinus rhythm  Other findings: T wave abnormality              Assessment and Plan        Diagnoses and all orders for this visit:    1. Essential hypertension (Primary)  -     Adult Transthoracic Echo Complete W/ Cont if Necessary Per Protocol; Future    2. Mixed hyperlipidemia    3. Type 2 diabetes mellitus without complication, without long-term current use of insulin    4. Family history of early CAD  -     CT Cardiac Calcium Score Without Dye; Future    5. Family history of aortic aneurysm  -     [place] aaa screen limited; Future    6. Dyspnea on effort  -     Adult Transthoracic Echo Complete W/ Cont if Necessary Per Protocol; Future        The patient has a 10 year ASCVD risk of 28.8 %  of non fatal and fatal MI/stroke. I would continue with statin therapy for goal LDL < 70. Advised to keep home BP log to re-evaluate her regimen. Her goal BP should be < 130/80 mmHg. Will obtain a 2 DECHO to assess degree of LVH and possible diastolic dysfunction. Will obtain a coronary calcium score for better risk stratification. She will be also scheduled for a AAA screen US due to her family history. Continue with lifestyle modification and heart healthy diet. Advised to increase her physical activities with regular exercise.     I spent 45 minutes caring for Adelita on this date of service. This time includes time spent by me in the following activities:reviewing tests, obtaining and/or reviewing a separately obtained history, performing a medically appropriate examination and/or evaluation , ordering medications, tests, or procedures, and documenting information in the medical record    Follow Up     Return for After testing, [facility].    Patient was given instructions and counseling regarding her condition or for health maintenance advice. Please see specific information pulled into the AVS if appropriate.

## 2024-12-17 ENCOUNTER — HOSPITAL ENCOUNTER (OUTPATIENT)
Dept: CARDIOLOGY | Facility: HOSPITAL | Age: 69
Discharge: HOME | End: 2024-12-17
Payer: MEDICARE

## 2024-12-17 ENCOUNTER — HOSPITAL ENCOUNTER (OUTPATIENT)
Dept: ULTRASOUND IMAGING | Facility: HOSPITAL | Age: 69
Discharge: HOME | End: 2024-12-17
Payer: MEDICARE

## 2024-12-17 DIAGNOSIS — R06.09 DYSPNEA ON EFFORT: ICD-10-CM

## 2024-12-17 DIAGNOSIS — Z82.49 FAMILY HISTORY OF AORTIC ANEURYSM: ICD-10-CM

## 2024-12-17 DIAGNOSIS — I10 ESSENTIAL HYPERTENSION: ICD-10-CM

## 2024-12-17 PROCEDURE — 76706 US ABDL AORTA SCREEN AAA: CPT

## 2024-12-17 PROCEDURE — 93306 TTE W/DOPPLER COMPLETE: CPT

## 2024-12-18 LAB
AORTIC DIMENSIONLESS INDEX: 0.65 (DI)
ASCENDING AORTA: 2.8 CM
BH CV ECHO MEAS - AI P1/2T: 587.9 MSEC
BH CV ECHO MEAS - AO MAX PG: 12.7 MMHG
BH CV ECHO MEAS - AO MEAN PG: 7 MMHG
BH CV ECHO MEAS - AO ROOT DIAM: 3.5 CM
BH CV ECHO MEAS - AO V2 MAX: 178 CM/SEC
BH CV ECHO MEAS - AO V2 VTI: 38.1 CM
BH CV ECHO MEAS - AVA(I,D): 2.38 CM2
BH CV ECHO MEAS - EDV(CUBED): 157.5 ML
BH CV ECHO MEAS - EDV(MOD-SP2): 87.9 ML
BH CV ECHO MEAS - EDV(MOD-SP4): 101 ML
BH CV ECHO MEAS - EF(MOD-BP): 54.2 %
BH CV ECHO MEAS - EF(MOD-SP2): 56.5 %
BH CV ECHO MEAS - EF(MOD-SP4): 54.2 %
BH CV ECHO MEAS - ESV(CUBED): 54.9 ML
BH CV ECHO MEAS - ESV(MOD-SP2): 38.2 ML
BH CV ECHO MEAS - ESV(MOD-SP4): 46.3 ML
BH CV ECHO MEAS - FS: 29.6 %
BH CV ECHO MEAS - IVS/LVPW: 0.69 CM
BH CV ECHO MEAS - IVSD: 0.9 CM
BH CV ECHO MEAS - LA DIMENSION: 3.5 CM
BH CV ECHO MEAS - LAT PEAK E' VEL: 11.4 CM/SEC
BH CV ECHO MEAS - LV DIASTOLIC VOL/BSA (35-75): 52 CM2
BH CV ECHO MEAS - LV MASS(C)D: 234.8 GRAMS
BH CV ECHO MEAS - LV MAX PG: 3.8 MMHG
BH CV ECHO MEAS - LV MEAN PG: 2 MMHG
BH CV ECHO MEAS - LV SYSTOLIC VOL/BSA (12-30): 23.8 CM2
BH CV ECHO MEAS - LV V1 MAX: 97.9 CM/SEC
BH CV ECHO MEAS - LV V1 VTI: 21.8 CM
BH CV ECHO MEAS - LVIDD: 5.4 CM
BH CV ECHO MEAS - LVIDS: 3.8 CM
BH CV ECHO MEAS - LVOT AREA: 4.2 CM2
BH CV ECHO MEAS - LVOT DIAM: 2.3 CM
BH CV ECHO MEAS - LVPWD: 1.3 CM
BH CV ECHO MEAS - MED PEAK E' VEL: 7.5 CM/SEC
BH CV ECHO MEAS - MV A MAX VEL: 128 CM/SEC
BH CV ECHO MEAS - MV DEC SLOPE: 584 CM/SEC2
BH CV ECHO MEAS - MV DEC TIME: 0.22 SEC
BH CV ECHO MEAS - MV E MAX VEL: 105 CM/SEC
BH CV ECHO MEAS - MV E/A: 0.82
BH CV ECHO MEAS - MV MEAN PG: 4 MMHG
BH CV ECHO MEAS - MV P1/2T: 62.2 MSEC
BH CV ECHO MEAS - MV V2 VTI: 33 CM
BH CV ECHO MEAS - MVA(P1/2T): 3.5 CM2
BH CV ECHO MEAS - MVA(VTI): 2.7 CM2
BH CV ECHO MEAS - RAP SYSTOLE: 5 MMHG
BH CV ECHO MEAS - RVDD: 2.8 CM
BH CV ECHO MEAS - RVSP: 28.4 MMHG
BH CV ECHO MEAS - SV(LVOT): 90.6 ML
BH CV ECHO MEAS - SV(MOD-SP2): 49.7 ML
BH CV ECHO MEAS - SV(MOD-SP4): 54.7 ML
BH CV ECHO MEAS - SVI(LVOT): 46.6 ML/M2
BH CV ECHO MEAS - SVI(MOD-SP2): 25.6 ML/M2
BH CV ECHO MEAS - SVI(MOD-SP4): 28.2 ML/M2
BH CV ECHO MEAS - TAPSE (>1.6): 2.15 CM
BH CV ECHO MEAS - TR MAX PG: 23.4 MMHG
BH CV ECHO MEAS - TR MAX VEL: 242 CM/SEC
BH CV ECHO MEASUREMENTS AVERAGE E/E' RATIO: 11.11
IVRT: 70 MS
LEFT ATRIUM VOLUME INDEX: 22.1 ML/M2

## 2025-03-26 ENCOUNTER — OFFICE VISIT (OUTPATIENT)
Dept: FAMILY MEDICINE CLINIC | Facility: CLINIC | Age: 70
End: 2025-03-26
Payer: MEDICARE

## 2025-03-26 VITALS
SYSTOLIC BLOOD PRESSURE: 124 MMHG | WEIGHT: 199 LBS | OXYGEN SATURATION: 95 % | DIASTOLIC BLOOD PRESSURE: 66 MMHG | BODY MASS INDEX: 35.26 KG/M2 | HEART RATE: 87 BPM | HEIGHT: 63 IN

## 2025-03-26 DIAGNOSIS — E11.9 TYPE 2 DIABETES MELLITUS WITHOUT COMPLICATION, WITHOUT LONG-TERM CURRENT USE OF INSULIN: ICD-10-CM

## 2025-03-26 DIAGNOSIS — K12.1 ULCERATION OF ORAL MUCOSA: Primary | ICD-10-CM

## 2025-03-26 RX ORDER — CLINDAMYCIN HYDROCHLORIDE 300 MG/1
CAPSULE ORAL
COMMUNITY
Start: 2025-03-19

## 2025-03-26 NOTE — PROGRESS NOTES
"Chief Complaint  Sore Throat and Diabetes    Subjective            Adelita Garcia presents to [facility]  History of Present Illness  Pt has c/o spot on back of throat. The spot is at the top of the throat and \"pimple\" like. Pt states on 3/18 pt had swelling on (R) side of face. Pt was seen by dentist  on 3/19 and was told she had abscessed teeth. Pt was given clindamycin and is still currently on the ABX. Pt sates 4 days ago she had a sharp pain in the back of the throat and noticed the white spot. She thinks the xray or the dental work may have caused it.    Pt is due labs/order placed.    Pt is due DM eye exam.  Pt states she has an upcoming appt. Pt was given form to be filled out.             Past Medical History:   Diagnosis Date    Abnormal bone density screening 10/17/2019    NORMAL    Anemia     Anxiety disorder     Arthritis 2015    Cataract     Removed    Diabetes mellitus     TYPE 2    Diabetic eye exam 2019    SCANNED IN CHART    Essential hypertension     Eye exam, routine 2019    YEARLY EYE EXAM    Hyperlipidemia     Hypertriglyceridemia 09/15/2017    Insomnia 2015    Joint pain     Major depressive disorder 2018    Pap smear for cervical cancer screening     NO LONGER    Visit for screening mammogram 01/15/2020    NORMAL    Vitamin D deficiency 2018       No Known Allergies     Past Surgical History:   Procedure Laterality Date    BREAST SURGERY      Benign breast biopsies     SECTION  ,    COLONOSCOPY  2012    -NORMAL REPEAT IN 10 YEARS     COLONOSCOPY N/A 8/15/2023    Procedure: COLONOSCOPY;  Surgeon: Hamilton Harris MD;  Location: [facility] ENDOSCOPY;  Service: Gastroenterology;  Laterality: N/A;  NORMAL COLONOSCOPY    DILATION AND CURETTAGE, DIAGNOSTIC / THERAPEUTIC  ,    EYE SURGERY      HYSTERECTOMY      JOINT REPLACEMENT      TUBAL ABDOMINAL LIGATION      "     Social History     Tobacco Use    Smoking status: Never     Passive exposure: Never    Smokeless tobacco: Never   Substance Use Topics    Alcohol use: Never       Family History   Problem Relation Age of Onset    Stroke Mother     Heart disease Mother         Bypass age 83.  11-22 age 96    Hypertension Mother     Heart disease Father         Abdominal aneurysm.  after surgery age 69    Hypertension Father     Heart disease Brother         Heart attacks mid 40’s. Current age 75    Arthritis Brother     Heart disease Brother         Cardiac bypass age 70. Current age 80    Colon cancer Neg Hx         Current Outpatient Medications on File Prior to Visit   Medication Sig    aspirin 81 MG EC tablet Take 1 tablet by mouth Daily.    baclofen (LIORESAL) 10 MG tablet Take 1 tablet by mouth Daily.    buPROPion XL (WELLBUTRIN XL) 150 MG 24 hr tablet Take 1 tablet by mouth Daily.    Cholecalciferol 50 MCG (2000 UT) tablet Vitamin D3 2,000 unit oral tablet take 1 tablet by oral route daily   Active    clindamycin (CLEOCIN) 300 MG capsule TAKE 1 CAPSULE BY MOUTH 4 TIMES DAILY UNTIL GONE    diclofenac (VOLTAREN) 75 MG EC tablet Take 1 tablet by mouth 2 (Two) Times a Day.    Diclofenac Sodium (VOLTAREN) 1 % gel gel Apply 4 g topically to the appropriate area as directed 4 (Four) Times a Day As Needed (JOINT PAIN).    FeroSul 325 (65 Fe) MG tablet Take 1 tablet by mouth 2 (Two) Times a Day.    FLUoxetine (PROzac) 40 MG capsule Take 1 capsule by mouth Every Morning.    losartan-hydrochlorothiazide (HYZAAR) 100-25 MG per tablet TAKE 1 TABLET DAILY    metFORMIN (GLUCOPHAGE) 1000 MG tablet Take 1 tablet by mouth 2 (Two) Times a Day With Meals.    pravastatin (PRAVACHOL) 20 MG tablet TAKE 1 TABLET DAILY    traZODone (DESYREL) 100 MG tablet Take 1 tablet by mouth Every Night.     No current facility-administered medications on file prior to visit.       Health Maintenance Due   Topic Date Due    URINE MICROALBUMIN-CREATININE  "RATIO (uACR)  01/29/2021    DIABETIC EYE EXAM  09/25/2024    HEMOGLOBIN A1C  05/20/2025       Objective     /66   Pulse 87   Ht 160 cm (63\")   Wt 90.3 kg (199 lb)   SpO2 95%   BMI 35.25 kg/m²       Physical Exam  Constitutional:       General: She is not in acute distress.     Appearance: Normal appearance. She is not ill-appearing.   HENT:      Head: Normocephalic and atraumatic.      Right Ear: Tympanic membrane, ear canal and external ear normal.      Left Ear: Tympanic membrane, ear canal and external ear normal.      Mouth/Throat:      Comments: Pinpoint ulceration to back of throat on soft palate on the right, no swelling noted,  no drainage noted  Cardiovascular:      Rate and Rhythm: Normal rate and regular rhythm.      Heart sounds: Normal heart sounds. No murmur heard.  Pulmonary:      Effort: Pulmonary effort is normal. No respiratory distress.      Breath sounds: Normal breath sounds.   Chest:      Chest wall: No tenderness.   Abdominal:      General: Abdomen is flat. Bowel sounds are normal. There is no distension.      Palpations: Abdomen is soft. There is no mass.      Tenderness: There is no abdominal tenderness. There is no guarding.   Musculoskeletal:         General: No swelling or tenderness. Normal range of motion.      Cervical back: Normal range of motion and neck supple.   Skin:     General: Skin is warm and dry.      Findings: No rash.   Neurological:      General: No focal deficit present.      Mental Status: She is alert and oriented to person, place, and time. Mental status is at baseline.      Gait: Gait normal.   Psychiatric:         Mood and Affect: Mood normal.         Behavior: Behavior normal.         Thought Content: Thought content normal.         Judgment: Judgment normal.           Result Review :                           Assessment and Plan        Diagnoses and all orders for this visit:    1. Ulceration of oral mucosa (Primary)  Comments:  magic mouth wash called " to pharmacy, if no resolution consider ENT consultation for eval    2. Type 2 diabetes mellitus without complication, without long-term current use of insulin  -     Microalbumin / Creatinine Urine Ratio - Urine, Clean Catch; Future  -     Hemoglobin A1c; Future              Follow Up     Return if symptoms worsen or fail to improve.    Patient was given instructions and counseling regarding her condition or for health maintenance advice. Please see specific information pulled into the AVS if appropriate.     Adelita STACY Garcia  reports that she has never smoked. She has never been exposed to tobacco smoke. She has never used smokeless tobacco.

## 2025-03-31 ENCOUNTER — HOSPITAL ENCOUNTER (OUTPATIENT)
Dept: CT IMAGING | Facility: HOSPITAL | Age: 70
Discharge: HOME | End: 2025-03-31
Admitting: INTERNAL MEDICINE

## 2025-03-31 DIAGNOSIS — Z82.49 FAMILY HISTORY OF EARLY CAD: ICD-10-CM

## 2025-03-31 PROCEDURE — 75571 CT HRT W/O DYE W/CA TEST: CPT

## 2025-04-07 ENCOUNTER — TELEPHONE (OUTPATIENT)
Dept: CARDIOLOGY | Facility: CLINIC | Age: 70
End: 2025-04-07

## 2025-04-07 ENCOUNTER — RESULTS FOLLOW-UP (OUTPATIENT)
Dept: CARDIOLOGY | Facility: CLINIC | Age: 70
End: 2025-04-07
Payer: MEDICARE

## 2025-04-07 DIAGNOSIS — E78.2 MIXED HYPERLIPIDEMIA: Primary | ICD-10-CM

## 2025-04-07 NOTE — TELEPHONE ENCOUNTER
Caller: Adelita Garcia    Relationship: Self    Best call back number: [phone]     Caller requesting test results: CT CARDIAC CALCIUM    What test was performed: CT CARDIAC CALCIUM    When was the test performed: 3.31.25    Where was the test performed:     Additional notes:  PATIENT IS WANTING TO KNOW THE RESULTS PLUS WHEN TO FOLLOW UP WITH THE PROVIDER. PLEASE CALL PATIENT TO ADVISE. THANK YOU!

## 2025-04-11 ENCOUNTER — LAB (OUTPATIENT)
Dept: LAB | Facility: HOSPITAL | Age: 70
End: 2025-04-11
Payer: MEDICARE

## 2025-04-11 DIAGNOSIS — E78.2 MIXED HYPERLIPIDEMIA: ICD-10-CM

## 2025-04-11 DIAGNOSIS — E11.9 TYPE 2 DIABETES MELLITUS WITHOUT COMPLICATION, WITHOUT LONG-TERM CURRENT USE OF INSULIN: ICD-10-CM

## 2025-04-11 LAB
ALBUMIN SERPL-MCNC: 4.1 G/DL (ref 3.5–5.2)
ALBUMIN UR-MCNC: 2.1 MG/DL
ALBUMIN/GLOB SERPL: 1.6 G/DL
ALP SERPL-CCNC: 106 U/L (ref 39–117)
ALT SERPL W P-5'-P-CCNC: 19 U/L (ref 1–33)
ANION GAP SERPL CALCULATED.3IONS-SCNC: 11 MMOL/L (ref 5–15)
AST SERPL-CCNC: 15 U/L (ref 1–32)
BILIRUB SERPL-MCNC: 0.2 MG/DL (ref 0–1.2)
BUN SERPL-MCNC: 17 MG/DL (ref 8–23)
BUN/CREAT SERPL: 14.3 (ref 7–25)
CALCIUM SPEC-SCNC: 9.5 MG/DL (ref 8.6–10.5)
CHLORIDE SERPL-SCNC: 98 MMOL/L (ref 98–107)
CHOLEST SERPL-MCNC: 183 MG/DL (ref 0–200)
CO2 SERPL-SCNC: 28 MMOL/L (ref 22–29)
CREAT SERPL-MCNC: 1.19 MG/DL (ref 0.57–1)
CREAT UR-MCNC: 61.4 MG/DL
EGFRCR SERPLBLD CKD-EPI 2021: 49.9 ML/MIN/1.73
GLOBULIN UR ELPH-MCNC: 2.6 GM/DL
GLUCOSE SERPL-MCNC: 101 MG/DL (ref 65–99)
HBA1C MFR BLD: 5.9 % (ref 4.8–5.6)
HDLC SERPL-MCNC: 40 MG/DL (ref 40–60)
LDLC SERPL CALC-MCNC: 104 MG/DL (ref 0–100)
LDLC/HDLC SERPL: 2.45 {RATIO}
MICROALBUMIN/CREAT UR: 34.2 MG/G (ref 0–29)
POTASSIUM SERPL-SCNC: 4.2 MMOL/L (ref 3.5–5.2)
PROT SERPL-MCNC: 6.7 G/DL (ref 6–8.5)
SODIUM SERPL-SCNC: 137 MMOL/L (ref 136–145)
TRIGL SERPL-MCNC: 226 MG/DL (ref 0–150)
VLDLC SERPL-MCNC: 39 MG/DL (ref 5–40)

## 2025-04-11 PROCEDURE — 82570 ASSAY OF URINE CREATININE: CPT

## 2025-04-11 PROCEDURE — 80053 COMPREHEN METABOLIC PANEL: CPT

## 2025-04-11 PROCEDURE — 83036 HEMOGLOBIN GLYCOSYLATED A1C: CPT

## 2025-04-11 PROCEDURE — 80061 LIPID PANEL: CPT

## 2025-04-11 PROCEDURE — 82043 UR ALBUMIN QUANTITATIVE: CPT

## 2025-04-11 PROCEDURE — 36415 COLL VENOUS BLD VENIPUNCTURE: CPT

## 2025-04-25 ENCOUNTER — OFFICE VISIT (OUTPATIENT)
Dept: CARDIOLOGY | Facility: CLINIC | Age: 70
End: 2025-04-25
Payer: MEDICARE

## 2025-04-25 VITALS
BODY MASS INDEX: 35.26 KG/M2 | SYSTOLIC BLOOD PRESSURE: 128 MMHG | DIASTOLIC BLOOD PRESSURE: 62 MMHG | HEART RATE: 86 BPM | WEIGHT: 199 LBS | HEIGHT: 63 IN

## 2025-04-25 DIAGNOSIS — I25.10 CORONARY ARTERY CALCIFICATION SEEN ON CT SCAN: Primary | ICD-10-CM

## 2025-04-25 DIAGNOSIS — I10 ESSENTIAL HYPERTENSION: ICD-10-CM

## 2025-04-25 DIAGNOSIS — I51.89 DIASTOLIC DYSFUNCTION: ICD-10-CM

## 2025-04-25 DIAGNOSIS — E78.2 MIXED HYPERLIPIDEMIA: ICD-10-CM

## 2025-04-25 RX ORDER — PRAVASTATIN SODIUM 40 MG
40 TABLET ORAL DAILY
Qty: 90 TABLET | Refills: 1 | Status: SHIPPED | OUTPATIENT
Start: 2025-04-25 | End: 2025-04-28 | Stop reason: SDUPTHER

## 2025-04-25 NOTE — PROGRESS NOTES
Chief Complaint  Follow-up    Subjective        History of Present Illness  Adelita Garcia presents to [facility] CARDIOLOGY   Ms. Garcia is a 69-year-old female coming in today for cardiac follow-up.  Medical history includes hypertension hyperlipidemia type 2 diabetes mellitus.  Since last office visit, she had a CT calcium score 1067.  She has no complaints of chest pains or shortness of breath.  Tolerating regular activity without any issues.      Past Medical History:   Diagnosis Date    Abnormal bone density screening 10/17/2019    Anemia     Anxiety disorder     Arthritis 2015    Cataract     Diabetes mellitus     Diabetic eye exam 2019    Essential hypertension     Eye exam, routine 2019    Hyperlipidemia     Hypertriglyceridemia 09/15/2017    Insomnia 2015    Joint pain     Major depressive disorder 2018    Pap smear for cervical cancer screening     Visit for screening mammogram 01/15/2020    Vitamin D deficiency 2018       No Known Allergies     Past Surgical History:   Procedure Laterality Date    BREAST SURGERY      Benign breast biopsies     SECTION      COLONOSCOPY  2012    -NORMAL REPEAT IN 10 YEARS     COLONOSCOPY N/A 8/15/2023    Procedure: COLONOSCOPY;  Surgeon: Hamilton Harris MD;  Location: [facility] ENDOSCOPY;  Service: Gastroenterology;  Laterality: N/A;  NORMAL COLONOSCOPY    DILATION AND CURETTAGE, DIAGNOSTIC / THERAPEUTIC      EYE SURGERY      HYSTERECTOMY      JOINT REPLACEMENT      TUBAL ABDOMINAL LIGATION          Social History  She  reports that she has never smoked. She has never been exposed to tobacco smoke. She has never used smokeless tobacco. She reports that she does not drink alcohol and does not use drugs.    Family History  Her family history includes Arthritis in her brother; Heart disease in her brother, brother, father, and mother; Hypertension in her  "father and mother; Stroke in her mother.       Current Outpatient Medications on File Prior to Visit   Medication Sig    aspirin 81 MG EC tablet Take 1 tablet by mouth Daily.    baclofen (LIORESAL) 10 MG tablet Take 1 tablet by mouth Daily.    buPROPion XL (WELLBUTRIN XL) 150 MG 24 hr tablet Take 1 tablet by mouth Daily.    Cholecalciferol 50 MCG (2000 UT) tablet Vitamin D3 2,000 unit oral tablet take 1 tablet by oral route daily   Active    clindamycin (CLEOCIN) 300 MG capsule TAKE 1 CAPSULE BY MOUTH 4 TIMES DAILY UNTIL GONE    diclofenac (VOLTAREN) 75 MG EC tablet Take 1 tablet by mouth 2 (Two) Times a Day.    Diclofenac Sodium (VOLTAREN) 1 % gel gel Apply 4 g topically to the appropriate area as directed 4 (Four) Times a Day As Needed (JOINT PAIN).    FeroSul 325 (65 Fe) MG tablet Take 1 tablet by mouth 2 (Two) Times a Day.    FLUoxetine (PROzac) 40 MG capsule Take 1 capsule by mouth Every Morning.    metFORMIN (GLUCOPHAGE) 1000 MG tablet Take 1 tablet by mouth 2 (Two) Times a Day With Meals.    traZODone (DESYREL) 100 MG tablet Take 1 tablet by mouth Every Night.     No current facility-administered medications on file prior to visit.         Review of Systems   Essentially negative    Objective   Vitals:    04/25/25 1027   BP: 128/62   Pulse: 86   Weight: 90.3 kg (199 lb)   Height: 160 cm (63\")         Physical Exam  General : Alert, awake, no acute distress  Neck : Supple, no carotid bruit, no jugular venous distention  CVS : Regular rate and rhythm, no murmur, no rubs or gallops  Lungs: Clear to auscultation bilaterally, no crackles or rhonchi  Abdomen: Soft, nontender, bowel sounds active  Extremities: Warm, well-perfused, no pedal edema      Result Review     The following data was reviewed by Conchita Cerda, APRN  No results found for: \"PROBNP\"  CMP          5/30/2024    10:01 11/20/2024    11:33 4/11/2025    10:01   CMP   Glucose 105  100  101    BUN 16  18  17    Creatinine 0.61  0.64  1.19    EGFR 98.1  " "96.4  49.9    Sodium 138  137  137    Potassium 4.0  4.0  4.2    Chloride 99  101  98    Calcium 9.3  9.2  9.5    Total Protein 6.7  6.7  6.7    Albumin 3.8  3.7  4.1    Globulin 2.9  3.0  2.6    Total Bilirubin 0.2  <0.2  0.2    Alkaline Phosphatase 103  107  106    AST (SGOT) 11  19  15    ALT (SGPT) 21  22  19    Albumin/Globulin Ratio 1.3  1.2  1.6    BUN/Creatinine Ratio 26.2  28.1  14.3    Anion Gap 13.7  10.7  11.0      CBC w/diff          5/30/2024    10:01 11/20/2024    11:33   CBC w/Diff   WBC 8.29  7.52    RBC 4.39  4.32    Hemoglobin 12.1  12.0    Hematocrit 36.8  36.3    MCV 83.8  84.0    MCH 27.6  27.8    MCHC 32.9  33.1    RDW 14.5  14.1    Platelets 353  319    Neutrophil Rel % 68.5  69.1    Immature Granulocyte Rel % 0.4  0.3    Lymphocyte Rel % 22.1  22.2    Monocyte Rel % 4.9  4.4    Eosinophil Rel % 3.6  3.7    Basophil Rel % 0.5  0.3       Lab Results   Component Value Date    TSH 1.640 11/20/2024      Lab Results   Component Value Date    FREET4 1.15 12/06/2022      No results found for: \"DDIMERQUANT\"  No results found for: \"MG\"   No results found for: \"DIGOXIN\"   No results found for: \"TROPONINT\"        Lipid Panel          5/30/2024    10:01 11/20/2024    11:33 4/11/2025    10:01   Lipid Panel   Total Cholesterol 202  173  183    Triglycerides 159  165  226    HDL Cholesterol 41  40  40    VLDL Cholesterol 29  29  39    LDL Cholesterol  132  104  104    LDL/HDL Ratio 3.15  2.50  2.45          Results for orders placed during the hospital encounter of 12/17/24    Adult Transthoracic Echo Complete W/ Cont if Necessary Per Protocol    Interpretation Summary    Left ventricular ejection fraction appears to be 51 - 55%.    Left ventricular diastolic function is consistent with (grade I) impaired relaxation.    There is calcification of the aortic valve.    Estimated right ventricular systolic pressure from tricuspid regurgitation is normal (<35 mmHg).             Assessment and Plan   Diagnoses " and all orders for this visit:    1. Coronary artery calcification seen on CT scan (Primary)    2. Essential hypertension  -     Basic Metabolic Panel; Future    3. Mixed hyperlipidemia    4. Diastolic dysfunction    Other orders  -      pravastatin (PRAVACHOL) 40 MG tablet; Take 1 tablet by mouth Daily.  Dispense: 90 tablet; Refill: 1    Coronary artery calcifications-calcium score significantly elevated, however she has no symptoms suggestive of angina.  We discussed further workup with coronary CTA, however giving her score being greater than thousand, quality of imaging may be somewhat limited, and as she is totally asymptomatic, she does not wish to move forward with CTA or stress test at this time.  We will work on aggressive risk factor modification including tight control of blood pressure, cholesterol and diabetes.    Hypertension-echocardiogram did show some evidence of hypertensive changes with grade 1 impaired relaxation.  Blood pressures are currently well-controlled, we will continue her current regiment.  Would consider adding beta-blocker for any future antihypertensive needs due to diastolic changes noted on echo.      Hyperlipidemia-recommend goal LDL below 70, will increase dose of pravastatin 40 mg nightly, if no improvement, would recommend changing to a higher intensity statin such as atorvastatin or rosuvastatin if she is able to tolerate.      Follow Up   Return in about 1 year (around 4/25/2026) for with Dr Cintron or Vanessa Vazquez.    Patient was given instructions and counseling regarding her condition or for health maintenance advice. Please see specific information pulled into the AVS if appropriate.     Signed,  VANESSA Ricci  04/25/2025     Dictated Utilizing Dragon Dictation: Please note that portions of this note were completed with a voice recognition program.  Part of this note may be an electronic transcription/translation of spoken language to printed text using the Dragon  Dictation System.

## 2025-04-28 ENCOUNTER — TELEPHONE (OUTPATIENT)
Dept: CARDIOLOGY | Facility: CLINIC | Age: 70
End: 2025-04-28
Payer: MEDICARE

## 2025-04-28 DIAGNOSIS — E78.1 HYPERTRIGLYCERIDEMIA: ICD-10-CM

## 2025-04-28 RX ORDER — PRAVASTATIN SODIUM 40 MG
40 TABLET ORAL DAILY
Qty: 90 TABLET | Refills: 1 | Status: SHIPPED | OUTPATIENT
Start: 2025-04-28

## 2025-05-12 DIAGNOSIS — I10 ESSENTIAL HYPERTENSION: ICD-10-CM

## 2025-05-12 RX ORDER — LOSARTAN POTASSIUM AND HYDROCHLOROTHIAZIDE 25; 100 MG/1; MG/1
1 TABLET ORAL DAILY
Qty: 90 TABLET | Refills: 1 | Status: SHIPPED | OUTPATIENT
Start: 2025-05-12

## 2025-05-19 ENCOUNTER — HOSPITAL ENCOUNTER (OUTPATIENT)
Dept: GENERAL RADIOLOGY | Facility: HOSPITAL | Age: 70
Discharge: HOME | End: 2025-05-19
Payer: MEDICARE

## 2025-05-19 ENCOUNTER — OFFICE VISIT (OUTPATIENT)
Dept: FAMILY MEDICINE CLINIC | Facility: CLINIC | Age: 70
End: 2025-05-19
Payer: MEDICARE

## 2025-05-19 ENCOUNTER — LAB (OUTPATIENT)
Dept: LAB | Facility: HOSPITAL | Age: 70
End: 2025-05-19
Payer: MEDICARE

## 2025-05-19 VITALS
SYSTOLIC BLOOD PRESSURE: 117 MMHG | HEART RATE: 78 BPM | OXYGEN SATURATION: 96 % | HEIGHT: 63 IN | BODY MASS INDEX: 34.38 KG/M2 | DIASTOLIC BLOOD PRESSURE: 47 MMHG | WEIGHT: 194 LBS

## 2025-05-19 DIAGNOSIS — E55.9 VITAMIN D DEFICIENCY: ICD-10-CM

## 2025-05-19 DIAGNOSIS — M89.8X1 PAIN OF RIGHT CLAVICLE: ICD-10-CM

## 2025-05-19 DIAGNOSIS — Z13.29 SCREENING FOR THYROID DISORDER: ICD-10-CM

## 2025-05-19 DIAGNOSIS — Z12.31 ENCOUNTER FOR SCREENING MAMMOGRAM FOR MALIGNANT NEOPLASM OF BREAST: ICD-10-CM

## 2025-05-19 DIAGNOSIS — F51.01 PRIMARY INSOMNIA: ICD-10-CM

## 2025-05-19 DIAGNOSIS — F41.9 ANXIETY: ICD-10-CM

## 2025-05-19 DIAGNOSIS — F33.0 MAJOR DEPRESSIVE DISORDER, RECURRENT, MILD: Primary | ICD-10-CM

## 2025-05-19 DIAGNOSIS — I10 ESSENTIAL HYPERTENSION: ICD-10-CM

## 2025-05-19 DIAGNOSIS — I10 PRIMARY HYPERTENSION: ICD-10-CM

## 2025-05-19 DIAGNOSIS — F33.0 MAJOR DEPRESSIVE DISORDER, RECURRENT, MILD: ICD-10-CM

## 2025-05-19 DIAGNOSIS — E11.9 TYPE 2 DIABETES MELLITUS WITHOUT COMPLICATION, WITHOUT LONG-TERM CURRENT USE OF INSULIN: ICD-10-CM

## 2025-05-19 DIAGNOSIS — E78.5 HYPERLIPIDEMIA, UNSPECIFIED HYPERLIPIDEMIA TYPE: ICD-10-CM

## 2025-05-19 PROCEDURE — 73000 X-RAY EXAM OF COLLAR BONE: CPT

## 2025-05-19 PROCEDURE — 80048 BASIC METABOLIC PNL TOTAL CA: CPT

## 2025-05-19 PROCEDURE — 36415 COLL VENOUS BLD VENIPUNCTURE: CPT

## 2025-05-19 PROCEDURE — 82306 VITAMIN D 25 HYDROXY: CPT

## 2025-05-19 PROCEDURE — 84443 ASSAY THYROID STIM HORMONE: CPT

## 2025-05-19 RX ORDER — BUPROPION HYDROCHLORIDE 150 MG/1
150 TABLET ORAL DAILY
Qty: 90 TABLET | Refills: 1 | Status: SHIPPED | OUTPATIENT
Start: 2025-05-19

## 2025-05-19 NOTE — PROGRESS NOTES
Chief Complaint  Anxiety, Depression, Vitamin D Deficiency, Anemia, Diabetes, Hypertension, Hyperlipidemia, Insomnia (/), and Knot on Collar Bone    Subjective            Adelita Garcia presents to [facility]  History of Present Illness  Pt is here for f/u for anxiety/depression, vit d def, anemia, DM2, HTN, HLD, and insomnia. Pt has c/o a tender knot on (R) collar bone. Pt states this has been present for a long time but has recently gotten larger. Pt states the knot is hard to the touch. NKI.     Pt is due labs/orders placed.    Mammogram due 25/orders placed.    Pt is followed by Dr. Cintron with cardiology.             Past Medical History:   Diagnosis Date    Abnormal bone density screening 10/17/2019    NORMAL    Anemia     Anxiety disorder     Arthritis 2015    Cataract     Removed    Diabetes mellitus     TYPE 2    Diabetic eye exam 2019    SCANNED IN CHART    Essential hypertension     Eye exam, routine 2019    YEARLY EYE EXAM    Hyperlipidemia     Hypertriglyceridemia 09/15/2017    Insomnia 2015    Joint pain     Major depressive disorder 2018    Pap smear for cervical cancer screening     NO LONGER    Visit for screening mammogram 01/15/2020    NORMAL    Vitamin D deficiency 2018       No Known Allergies     Past Surgical History:   Procedure Laterality Date    BREAST SURGERY      Benign breast biopsies     SECTION  ,    COLONOSCOPY  2012    -NORMAL REPEAT IN 10 YEARS     COLONOSCOPY N/A 8/15/2023    Procedure: COLONOSCOPY;  Surgeon: Hamilton Harris MD;  Location: [facility] ENDOSCOPY;  Service: Gastroenterology;  Laterality: N/A;  NORMAL COLONOSCOPY    DILATION AND CURETTAGE, DIAGNOSTIC / THERAPEUTIC  ,    EYE SURGERY      HYSTERECTOMY      JOINT REPLACEMENT      TUBAL ABDOMINAL LIGATION          Social History     Tobacco Use    Smoking status: Never     Passive exposure:  Never    Smokeless tobacco: Never   Substance Use Topics    Alcohol use: Never       Family History   Problem Relation Age of Onset    Stroke Mother     Heart disease Mother         Bypass age 83.  11-22 age 96    Hypertension Mother     Heart disease Father         Abdominal aneurysm.  after surgery age 69    Hypertension Father     Heart disease Brother         Heart attacks mid 40’s. Current age 75    Arthritis Brother     Heart disease Brother         Cardiac bypass age 70. Current age 80    Colon cancer Neg Hx         Current Outpatient Medications on File Prior to Visit   Medication Sig    aspirin 81 MG EC tablet Take 1 tablet by mouth Daily.    baclofen (LIORESAL) 10 MG tablet Take 1 tablet by mouth Daily.    Cholecalciferol 50 MCG ( UT) tablet Vitamin D3 2,000 unit oral tablet take 1 tablet by oral route daily   Active    diclofenac (VOLTAREN) 75 MG EC tablet Take 1 tablet by mouth 2 (Two) Times a Day.    Diclofenac Sodium (VOLTAREN) 1 % gel gel Apply 4 g topically to the appropriate area as directed 4 (Four) Times a Day As Needed (JOINT PAIN).    FeroSul 325 (65 Fe) MG tablet Take 1 tablet by mouth 2 (Two) Times a Day.    FLUoxetine (PROzac) 40 MG capsule Take 1 capsule by mouth Every Morning.    losartan-hydrochlorothiazide (HYZAAR) 100-25 MG per tablet TAKE 1 TABLET DAILY    metFORMIN (GLUCOPHAGE) 1000 MG tablet Take 1 tablet by mouth 2 (Two) Times a Day With Meals.    pravastatin (PRAVACHOL) 40 MG tablet Take 1 tablet by mouth Daily.    traZODone (DESYREL) 100 MG tablet Take 1 tablet by mouth Every Night.    [DISCONTINUED] buPROPion XL (WELLBUTRIN XL) 150 MG 24 hr tablet Take 1 tablet by mouth Daily.    [DISCONTINUED] clindamycin (CLEOCIN) 300 MG capsule TAKE 1 CAPSULE BY MOUTH 4 TIMES DAILY UNTIL GONE (Patient not taking: Reported on 2025)     No current facility-administered medications on file prior to visit.       Health Maintenance Due   Topic Date Due    ANNUAL WELLNESS VISIT   "07/02/2025       Objective     /47   Pulse 78   Ht 160 cm (63\")   Wt 88 kg (194 lb)   SpO2 96%   BMI 34.37 kg/m²       Physical Exam  Constitutional:       General: She is not in acute distress.     Appearance: Normal appearance. She is not ill-appearing.   HENT:      Head: Normocephalic and atraumatic.      Right Ear: Tympanic membrane, ear canal and external ear normal.      Left Ear: Tympanic membrane, ear canal and external ear normal.      Nose: Nose normal.   Cardiovascular:      Rate and Rhythm: Normal rate and regular rhythm.      Pulses: Normal pulses.      Heart sounds: Normal heart sounds. No murmur heard.  Pulmonary:      Effort: Pulmonary effort is normal. No respiratory distress.      Breath sounds: Normal breath sounds.   Chest:      Chest wall: Tenderness present.      Comments: Tenderness upon palpation of right clavicle next to trachea, slightly enlarged  Abdominal:      General: Abdomen is flat. Bowel sounds are normal. There is no distension.      Palpations: Abdomen is soft. There is no mass.      Tenderness: There is no abdominal tenderness. There is no guarding.   Musculoskeletal:         General: No swelling or tenderness. Normal range of motion.      Cervical back: Normal range of motion and neck supple.   Skin:     General: Skin is warm and dry.      Findings: No rash.   Neurological:      General: No focal deficit present.      Mental Status: She is alert and oriented to person, place, and time. Mental status is at baseline.      Gait: Gait normal.   Psychiatric:         Attention and Perception: Attention normal.         Mood and Affect: Mood and affect normal.         Speech: Speech normal.         Behavior: Behavior normal. Behavior is cooperative.         Thought Content: Thought content normal. Thought content does not include suicidal ideation.         Judgment: Judgment normal.           Result Review :                           Assessment and Plan        Diagnoses and " all orders for this visit:    1. Major depressive disorder, recurrent, mild (Primary)  Comments:  stable on Wellbutrin 150mg and Prozac 40mg, continue    2. Vitamin D deficiency  Comments:  stable on VIt D 2000UT, continue  Orders:  -     Vitamin D 25 hydroxy; Future    3. Primary hypertension  Comments:  stable on  Hyzaar 100/25mg, continue, continue f/u with Cardiology for mgmt    4. Hyperlipidemia, unspecified hyperlipidemia type  Comments:  stable on Pravachol 40mg, continue    5. Primary insomnia  Comments:  stable oin Trazodone 100mg, continue    6. Screening for thyroid disorder  -     TSH; Future    7. Encounter for screening mammogram for malignant neoplasm of breast  -     Mammo Screening Digital Tomosynthesis Bilateral With CAD; Future    8. Type 2 diabetes mellitus without complication, without long-term current use of insulin  Comments:  stable on Metformin 1000mg, continue    9. Pain of right clavicle  -     XR Clavicle Right; Future              Follow Up     Return in about 6 months (around 11/19/2025), or if symptoms worsen or fail to improve.    Patient was given instructions and counseling regarding her condition or for health maintenance advice. Please see specific information pulled into the AVS if appropriate.     Adelita STACY Garcia  reports that she has never smoked. She has never been exposed to tobacco smoke. She has never used smokeless tobacco.

## 2025-05-20 LAB
25(OH)D3 SERPL-MCNC: 43.8 NG/ML (ref 30–100)
ANION GAP SERPL CALCULATED.3IONS-SCNC: 13.3 MMOL/L (ref 5–15)
BUN SERPL-MCNC: 17 MG/DL (ref 8–23)
BUN/CREAT SERPL: 23.3 (ref 7–25)
CALCIUM SPEC-SCNC: 9.8 MG/DL (ref 8.6–10.5)
CHLORIDE SERPL-SCNC: 99 MMOL/L (ref 98–107)
CO2 SERPL-SCNC: 26.7 MMOL/L (ref 22–29)
CREAT SERPL-MCNC: 0.73 MG/DL (ref 0.57–1)
EGFRCR SERPLBLD CKD-EPI 2021: 89.7 ML/MIN/1.73
GLUCOSE SERPL-MCNC: 78 MG/DL (ref 65–99)
POTASSIUM SERPL-SCNC: 4.2 MMOL/L (ref 3.5–5.2)
SODIUM SERPL-SCNC: 139 MMOL/L (ref 136–145)
TSH SERPL DL<=0.05 MIU/L-ACNC: 2.37 UIU/ML (ref 0.27–4.2)

## 2025-05-24 DIAGNOSIS — M25.50 ARTHRALGIA, UNSPECIFIED JOINT: ICD-10-CM

## 2025-05-27 RX ORDER — BACLOFEN 10 MG/1
10 TABLET ORAL DAILY
Qty: 90 TABLET | Refills: 1 | Status: SHIPPED | OUTPATIENT
Start: 2025-05-27

## 2025-06-02 DIAGNOSIS — E11.9 TYPE 2 DIABETES MELLITUS WITHOUT COMPLICATION, WITHOUT LONG-TERM CURRENT USE OF INSULIN: ICD-10-CM

## 2025-06-02 DIAGNOSIS — M25.511 ACUTE PAIN OF RIGHT SHOULDER: Primary | ICD-10-CM

## 2025-06-26 ENCOUNTER — TELEPHONE (OUTPATIENT)
Dept: FAMILY MEDICINE CLINIC | Facility: CLINIC | Age: 70
End: 2025-06-26

## 2025-06-26 ENCOUNTER — CLINICAL SUPPORT (OUTPATIENT)
Dept: FAMILY MEDICINE CLINIC | Facility: CLINIC | Age: 70
End: 2025-06-26
Payer: MEDICARE

## 2025-06-26 DIAGNOSIS — Z79.899 LONG TERM USE OF DRUG: Primary | ICD-10-CM

## 2025-06-26 LAB
AMPHET+METHAMPHET UR QL: NEGATIVE
AMPHETAMINE INTERNAL CONTROL: NORMAL
AMPHETAMINES UR QL: NEGATIVE
BARBITURATE INTERNAL CONTROL: NORMAL
BARBITURATES UR QL SCN: NEGATIVE
BENZODIAZ UR QL SCN: NEGATIVE
BENZODIAZEPINE INTERNAL CONTROL: NORMAL
BUPRENORPHINE INTERNAL CONTROL: NORMAL
BUPRENORPHINE SERPL-MCNC: NEGATIVE NG/ML
CANNABINOIDS SERPL QL: NEGATIVE
COCAINE INTERNAL CONTROL: NORMAL
COCAINE UR QL: NEGATIVE
EXPIRATION DATE: NORMAL
Lab: NORMAL
MDMA (ECSTASY) INTERNAL CONTROL: NORMAL
MDMA UR QL SCN: NEGATIVE
METHADONE INTERNAL CONTROL: NORMAL
METHADONE UR QL SCN: NEGATIVE
METHAMPHETAMINE INTERNAL CONTROL: NORMAL
MORPHINE INTERNAL CONTROL: NORMAL
MORPHINE/OPIATES SCREEN, URINE: NEGATIVE
OXYCODONE INTERNAL CONTROL: NORMAL
OXYCODONE UR QL SCN: NEGATIVE
PCP UR QL SCN: NEGATIVE
PHENCYCLIDINE INTERNAL CONTROL: NORMAL
THC INTERNAL CONTROL: NORMAL

## 2025-06-26 RX ORDER — DIAZEPAM 2 MG/1
TABLET ORAL
Qty: 2 TABLET | Refills: 0 | Status: SHIPPED | OUTPATIENT
Start: 2025-06-26

## 2025-06-26 NOTE — TELEPHONE ENCOUNTER
Pt is requesting medication for claustrophobia during MRI. MRI scheduled 7/11/25    Per faviola Jeffries to send valium. Need UDS and CSA.    Pt stopped in office and updated UDS and CSA.     Controlled Medication Refill Request:    1.  Last Office Visit:  5/19/25    2.  Next Office Visit:  7/17/2025     3.  Last UDS Date:  6/26/26    4.  Last Consent Signed:  6/26/27    5.  Medication Requested: Valium (Diazepam) to [facility] pharmacy.     Pharmacy:   [facility] [address] [place] [address] [address] [facility] - [phone]  - [phone]   [address] [facility] [address]  Phone: [phone] Fax: [phone]    [facility] - [place], [place] - [address] [facility] - [phone] PH - [phone] FX  [address] [facility] [address]  Phone: [phone] Fax: [phone]

## 2025-07-11 ENCOUNTER — HOSPITAL ENCOUNTER (OUTPATIENT)
Dept: MRI IMAGING | Facility: HOSPITAL | Age: 70
Discharge: HOME | End: 2025-07-11
Payer: MEDICARE

## 2025-07-11 DIAGNOSIS — M25.511 ACUTE PAIN OF RIGHT SHOULDER: ICD-10-CM

## 2025-07-11 LAB
CREAT BLDA-MCNC: 0.7 MG/DL (ref 0.6–1.3)
EGFRCR SERPLBLD CKD-EPI 2021: 94.3 ML/MIN/1.73

## 2025-07-11 PROCEDURE — A9573 GADOPICLENOL 0.5 MMOL/ML SOLUTION: HCPCS | Performed by: NURSE PRACTITIONER

## 2025-07-11 PROCEDURE — 25510000001 GADOPICLENOL 0.5 MMOL/ML SOLUTION: Performed by: NURSE PRACTITIONER

## 2025-07-11 PROCEDURE — 82565 ASSAY OF CREATININE: CPT

## 2025-07-11 PROCEDURE — 73223 MRI JOINT UPR EXTR W/O&W/DYE: CPT

## 2025-07-11 RX ADMIN — GADOPICLENOL 8 ML: 485.1 INJECTION INTRAVENOUS at 15:03

## 2025-07-12 DIAGNOSIS — F41.9 ANXIETY: ICD-10-CM

## 2025-07-12 DIAGNOSIS — F33.0 MAJOR DEPRESSIVE DISORDER, RECURRENT, MILD: ICD-10-CM

## 2025-07-12 DIAGNOSIS — F51.01 PRIMARY INSOMNIA: ICD-10-CM

## 2025-07-14 RX ORDER — TRAZODONE HYDROCHLORIDE 100 MG/1
100 TABLET ORAL NIGHTLY
Qty: 90 TABLET | Refills: 1 | Status: SHIPPED | OUTPATIENT
Start: 2025-07-14

## 2025-07-14 RX ORDER — FLUOXETINE HYDROCHLORIDE 40 MG/1
40 CAPSULE ORAL EVERY MORNING
Qty: 90 CAPSULE | Refills: 1 | Status: SHIPPED | OUTPATIENT
Start: 2025-07-14

## 2025-07-17 ENCOUNTER — OFFICE VISIT (OUTPATIENT)
Dept: FAMILY MEDICINE CLINIC | Facility: CLINIC | Age: 70
End: 2025-07-17
Payer: MEDICARE

## 2025-07-17 VITALS
SYSTOLIC BLOOD PRESSURE: 120 MMHG | HEIGHT: 63 IN | WEIGHT: 193 LBS | OXYGEN SATURATION: 95 % | HEART RATE: 78 BPM | BODY MASS INDEX: 34.2 KG/M2 | DIASTOLIC BLOOD PRESSURE: 73 MMHG

## 2025-07-17 DIAGNOSIS — Z00.00 MEDICARE ANNUAL WELLNESS VISIT, SUBSEQUENT: Primary | ICD-10-CM

## 2025-07-17 PROCEDURE — 3078F DIAST BP <80 MM HG: CPT | Performed by: NURSE PRACTITIONER

## 2025-07-17 PROCEDURE — 1170F FXNL STATUS ASSESSED: CPT | Performed by: NURSE PRACTITIONER

## 2025-07-17 PROCEDURE — 3074F SYST BP LT 130 MM HG: CPT | Performed by: NURSE PRACTITIONER

## 2025-07-17 PROCEDURE — 1159F MED LIST DOCD IN RCRD: CPT | Performed by: NURSE PRACTITIONER

## 2025-07-17 PROCEDURE — 1126F AMNT PAIN NOTED NONE PRSNT: CPT | Performed by: NURSE PRACTITIONER

## 2025-07-17 PROCEDURE — 3044F HG A1C LEVEL LT 7.0%: CPT | Performed by: NURSE PRACTITIONER

## 2025-07-17 PROCEDURE — G0439 PPPS, SUBSEQ VISIT: HCPCS | Performed by: NURSE PRACTITIONER

## 2025-07-17 PROCEDURE — 1160F RVW MEDS BY RX/DR IN RCRD: CPT | Performed by: NURSE PRACTITIONER

## 2025-07-17 NOTE — PROGRESS NOTES
Subjective   The ABCs of the Annual Wellness Visit  Medicare Wellness Visit      Adelita Garcia is a 69 y.o. patient who presents for a Medicare Wellness Visit.    The following portions of the patient's history were reviewed and   updated as appropriate: allergies, current medications, past family history, past medical history, past social history, past surgical history, and problem list.    Compared to one year ago, the patient's physical   health is better.  Compared to one year ago, the patient's mental   health is better.    Recent Hospitalizations:  She was not admitted to the hospital during the last year.     Current Medical Providers:  Patient Care Team:  Nesha Gomez APRN as PCP - General (Nurse Practitioner)  Diego Valdez MD as Consulting Physician (Cardiology)    Outpatient Medications Prior to Visit   Medication Sig Dispense Refill    aspirin 81 MG EC tablet Take 1 tablet by mouth Daily. 90 tablet 1    baclofen (LIORESAL) 10 MG tablet TAKE 1 TABLET DAILY 90 tablet 1    buPROPion XL (WELLBUTRIN XL) 150 MG 24 hr tablet TAKE 1 TABLET DAILY 90 tablet 1    Cholecalciferol 50 MCG (2000 UT) tablet Vitamin D3 2,000 unit oral tablet take 1 tablet by oral route daily   Active      diazePAM (Valium) 2 MG tablet Take 1 tab 30 min prior to procedure 2 tablet 0    diclofenac (VOLTAREN) 75 MG EC tablet Take 1 tablet by mouth 2 (Two) Times a Day. 180 tablet 1    Diclofenac Sodium (VOLTAREN) 1 % gel gel Apply 4 g topically to the appropriate area as directed 4 (Four) Times a Day As Needed (JOINT PAIN). 350 g 0    FeroSul 325 (65 Fe) MG tablet Take 1 tablet by mouth 2 (Two) Times a Day. 180 tablet 1    FLUoxetine (PROzac) 40 MG capsule TAKE 1 CAPSULE EVERY MORNING 90 capsule 1    losartan-hydrochlorothiazide (HYZAAR) 100-25 MG per tablet TAKE 1 TABLET DAILY 90 tablet 1    metFORMIN (GLUCOPHAGE) 1000 MG tablet TAKE 1 TABLET TWICE A DAY WITH MEALS 180 tablet 1    pravastatin (PRAVACHOL) 40 MG tablet Take 1 tablet  "by mouth Daily. 90 tablet 1    traZODone (DESYREL) 100 MG tablet TAKE 1 TABLET EVERY NIGHT 90 tablet 1     No facility-administered medications prior to visit.     No opioid medication identified on active medication list. I have reviewed chart for other potential  high risk medication/s and harmful drug interactions in the elderly.      Aspirin is on active medication list. Aspirin use is indicated based on review of current medical condition/s. Pros and cons of this therapy have been discussed today. Benefits of this medication outweigh potential harm.  Patient has been encouraged to continue taking this medication.  .      Patient Active Problem List   Diagnosis    Anemia    Anxiety disorder    Arthritis    Essential hypertension    History of total knee replacement    Hyperlipidemia    Hypertriglyceridemia    Insomnia    Joint pain    Major depressive disorder    Vitamin D deficiency    Osteoarthritis    Type 2 diabetes mellitus without complication    Encounter for screening for malignant neoplasm of colon     Advance Care Planning Advance Directive is not on file.  ACP discussion was held with the patient during this visit. Patient does not have an advance directive, declines further assistance.            Objective   Vitals:    07/17/25 1258   BP: 120/73   Pulse: 78   SpO2: 95%   Weight: 87.5 kg (193 lb)   Height: 160 cm (63\")   PainSc: 0-No pain       Estimated body mass index is 34.19 kg/m² as calculated from the following:    Height as of this encounter: 160 cm (63\").    Weight as of this encounter: 87.5 kg (193 lb).    BMI is >= 30 and <35. (Class 1 Obesity). The following options were offered after discussion;: weight loss educational material (shared in after visit summary), exercise counseling/recommendations, and nutrition counseling/recommendations           Does the patient have evidence of cognitive impairment? No                                                                                       "         Health  Risk Assessment    Smoking Status:  Social History     Tobacco Use   Smoking Status Never    Passive exposure: Never   Smokeless Tobacco Never     Alcohol Consumption:  Social History     Substance and Sexual Activity   Alcohol Use Never       Fall Risk Screen  STEADI Fall Risk Assessment was completed, and patient is at LOW risk for falls.Assessment completed on:2025    Depression Screening   Little interest or pleasure in doing things? Not at all   Feeling down, depressed, or hopeless? Not at all   PHQ-2 Total Score 0      Health Habits and Functional and Cognitive Screenin/17/2025     1:00 PM   Functional & Cognitive Status   Do you have difficulty preparing food and eating? No   Do you have difficulty bathing yourself, getting dressed or grooming yourself? No   Do you have difficulty using the toilet? No   Do you have difficulty moving around from place to place? No   Do you have trouble with steps or getting out of a bed or a chair? No   Current Diet Well Balanced Diet   Dental Exam Up to date   Eye Exam Up to date   Exercise (times per week) 0 times per week   Current Exercises Include No Regular Exercise   Do you need help using the phone?  No   Are you deaf or do you have serious difficulty hearing?  No   Do you need help to go to places out of walking distance? No   Do you need help shopping? No   Do you need help preparing meals?  No   Do you need help with housework?  No   Do you need help with laundry? No   Do you need help taking your medications? No   Do you need help managing money? No   Do you ever drive or ride in a car without wearing a seat belt? No   Have you felt unusual fatigue (could be tiredness), stress, anger or loneliness in the last month? No   Who do you live with? Spouse   If you need help, do you have trouble finding someone available to you? No   Have you been bothered in the last four weeks by sexual problems? No   Do you have difficulty concentrating,  remembering or making decisions? No           Age-appropriate Screening Schedule:  Refer to the list below for future screening recommendations based on patient's age, sex and/or medical conditions. Orders for these recommended tests are listed in the plan section. The patient has been provided with a written plan.    Health Maintenance List  Health Maintenance   Topic Date Due    HEMOGLOBIN A1C  10/11/2025    COVID-19 Vaccine (8 - 2024-25 season) 05/19/2026 (Originally 5/1/2025)    INFLUENZA VACCINE  10/01/2025    DIABETIC FOOT EXAM  11/18/2025    LIPID PANEL  04/11/2026    URINE MICROALBUMIN-CREATININE RATIO (uACR)  04/11/2026    DIABETIC EYE EXAM  04/30/2026    ANNUAL WELLNESS VISIT  07/17/2026    MAMMOGRAM  08/30/2026    DXA SCAN  08/30/2026    TDAP/TD VACCINES (2 - Td or Tdap) 05/09/2027    COLORECTAL CANCER SCREENING  08/15/2033    HEPATITIS C SCREENING  Completed    Pneumococcal Vaccine 50+  Completed    ZOSTER VACCINE  Completed                                                                                                                                                CMS Preventative Services Quick Reference  Risk Factors Identified During Encounter  None Identified    The above risks/problems have been discussed with the patient.  Pertinent information has been shared with the patient in the After Visit Summary.  An After Visit Summary and PPPS were made available to the patient.    Follow Up:   Next Medicare Wellness visit to be scheduled in 1 year.     Assessment & Plan  Medicare annual wellness visit, subsequent              Follow Up:   Return in about 1 year (around 7/17/2026) for Medicare Wellness.

## 2025-07-31 PROCEDURE — 87186 SC STD MICRODIL/AGAR DIL: CPT

## 2025-07-31 PROCEDURE — 87086 URINE CULTURE/COLONY COUNT: CPT

## 2025-07-31 PROCEDURE — 87077 CULTURE AEROBIC IDENTIFY: CPT

## (undated) DEVICE — CONN JET HYDRA H20 AUXILIARY DISP

## (undated) DEVICE — Device: Brand: DEFENDO AIR/WATER/SUCTION AND BIOPSY VALVE

## (undated) DEVICE — SOL IRRG H2O PL/BG 1000ML STRL

## (undated) DEVICE — SOLIDIFIER LIQLOC PLS 1500CC BT

## (undated) DEVICE — Device

## (undated) DEVICE — LINER SURG CANSTR SXN S/RIGD 1500CC